# Patient Record
Sex: MALE | Race: WHITE | NOT HISPANIC OR LATINO | URBAN - METROPOLITAN AREA
[De-identification: names, ages, dates, MRNs, and addresses within clinical notes are randomized per-mention and may not be internally consistent; named-entity substitution may affect disease eponyms.]

---

## 2022-01-25 ENCOUNTER — INPATIENT (INPATIENT)
Facility: HOSPITAL | Age: 22
LOS: 27 days | Discharge: ANOTHER HOSPITAL | End: 2022-02-22
Attending: STUDENT IN AN ORGANIZED HEALTH CARE EDUCATION/TRAINING PROGRAM | Admitting: PSYCHIATRY & NEUROLOGY
Payer: COMMERCIAL

## 2022-01-25 VITALS — OXYGEN SATURATION: 100 %

## 2022-01-25 DIAGNOSIS — F41.9 ANXIETY DISORDER, UNSPECIFIED: ICD-10-CM

## 2022-01-25 DIAGNOSIS — F25.0 SCHIZOAFFECTIVE DISORDER, BIPOLAR TYPE: ICD-10-CM

## 2022-01-25 DIAGNOSIS — F84.0 AUTISTIC DISORDER: ICD-10-CM

## 2022-01-25 LAB
ALBUMIN SERPL ELPH-MCNC: 4.5 G/DL — SIGNIFICANT CHANGE UP (ref 3.3–5)
ALP SERPL-CCNC: 72 U/L — SIGNIFICANT CHANGE UP (ref 40–120)
ALT FLD-CCNC: 17 U/L — SIGNIFICANT CHANGE UP (ref 4–41)
ANION GAP SERPL CALC-SCNC: 13 MMOL/L — SIGNIFICANT CHANGE UP (ref 7–14)
APAP SERPL-MCNC: <10 UG/ML — LOW (ref 15–25)
AST SERPL-CCNC: 25 U/L — SIGNIFICANT CHANGE UP (ref 4–40)
BASOPHILS # BLD AUTO: 0 K/UL — SIGNIFICANT CHANGE UP (ref 0–0.2)
BASOPHILS NFR BLD AUTO: 0 % — SIGNIFICANT CHANGE UP (ref 0–2)
BILIRUB SERPL-MCNC: 1 MG/DL — SIGNIFICANT CHANGE UP (ref 0.2–1.2)
BUN SERPL-MCNC: 15 MG/DL — SIGNIFICANT CHANGE UP (ref 7–23)
CALCIUM SERPL-MCNC: 9.5 MG/DL — SIGNIFICANT CHANGE UP (ref 8.4–10.5)
CHLORIDE SERPL-SCNC: 100 MMOL/L — SIGNIFICANT CHANGE UP (ref 98–107)
CO2 SERPL-SCNC: 23 MMOL/L — SIGNIFICANT CHANGE UP (ref 22–31)
COVID-19 NUCLEOCAPSID GAM AB INTERP: POSITIVE
COVID-19 NUCLEOCAPSID TOTAL GAM ANTIBODY RESULT: 67.5 INDEX — HIGH
COVID-19 SPIKE DOMAIN AB INTERP: POSITIVE
COVID-19 SPIKE DOMAIN ANTIBODY RESULT: >250 U/ML — HIGH
CREAT SERPL-MCNC: 0.96 MG/DL — SIGNIFICANT CHANGE UP (ref 0.5–1.3)
EOSINOPHIL # BLD AUTO: 0 K/UL — SIGNIFICANT CHANGE UP (ref 0–0.5)
EOSINOPHIL NFR BLD AUTO: 0 % — SIGNIFICANT CHANGE UP (ref 0–6)
ETHANOL SERPL-MCNC: <10 MG/DL — SIGNIFICANT CHANGE UP
FLUAV AG NPH QL: SIGNIFICANT CHANGE UP
FLUBV AG NPH QL: SIGNIFICANT CHANGE UP
GLUCOSE SERPL-MCNC: 94 MG/DL — SIGNIFICANT CHANGE UP (ref 70–99)
HCT VFR BLD CALC: 44.6 % — SIGNIFICANT CHANGE UP (ref 39–50)
HGB BLD-MCNC: 15 G/DL — SIGNIFICANT CHANGE UP (ref 13–17)
IANC: 6.84 K/UL — SIGNIFICANT CHANGE UP (ref 1.5–8.5)
LYMPHOCYTES # BLD AUTO: 1.46 K/UL — SIGNIFICANT CHANGE UP (ref 1–3.3)
LYMPHOCYTES # BLD AUTO: 14.5 % — SIGNIFICANT CHANGE UP (ref 13–44)
MANUAL SMEAR VERIFICATION: SIGNIFICANT CHANGE UP
MCHC RBC-ENTMCNC: 29.6 PG — SIGNIFICANT CHANGE UP (ref 27–34)
MCHC RBC-ENTMCNC: 33.6 GM/DL — SIGNIFICANT CHANGE UP (ref 32–36)
MCV RBC AUTO: 88 FL — SIGNIFICANT CHANGE UP (ref 80–100)
MONOCYTES # BLD AUTO: 1.29 K/UL — HIGH (ref 0–0.9)
MONOCYTES NFR BLD AUTO: 12.8 % — SIGNIFICANT CHANGE UP (ref 2–14)
NEUTROPHILS # BLD AUTO: 7.08 K/UL — SIGNIFICANT CHANGE UP (ref 1.8–7.4)
NEUTROPHILS NFR BLD AUTO: 70.1 % — SIGNIFICANT CHANGE UP (ref 43–77)
PLAT MORPH BLD: NORMAL — SIGNIFICANT CHANGE UP
PLATELET # BLD AUTO: 112 K/UL — LOW (ref 150–400)
PLATELET COUNT - ESTIMATE: ABNORMAL
POTASSIUM SERPL-MCNC: 4.3 MMOL/L — SIGNIFICANT CHANGE UP (ref 3.5–5.3)
POTASSIUM SERPL-SCNC: 4.3 MMOL/L — SIGNIFICANT CHANGE UP (ref 3.5–5.3)
PROT SERPL-MCNC: 7.8 G/DL — SIGNIFICANT CHANGE UP (ref 6–8.3)
RBC # BLD: 5.07 M/UL — SIGNIFICANT CHANGE UP (ref 4.2–5.8)
RBC # FLD: 13.4 % — SIGNIFICANT CHANGE UP (ref 10.3–14.5)
RBC BLD AUTO: NORMAL — SIGNIFICANT CHANGE UP
RSV RNA NPH QL NAA+NON-PROBE: SIGNIFICANT CHANGE UP
SALICYLATES SERPL-MCNC: <0.3 MG/DL — LOW (ref 15–30)
SARS-COV-2 IGG+IGM SERPL QL IA: 67.5 INDEX — HIGH
SARS-COV-2 IGG+IGM SERPL QL IA: >250 U/ML — HIGH
SARS-COV-2 IGG+IGM SERPL QL IA: POSITIVE
SARS-COV-2 IGG+IGM SERPL QL IA: POSITIVE
SARS-COV-2 RNA SPEC QL NAA+PROBE: SIGNIFICANT CHANGE UP
SODIUM SERPL-SCNC: 136 MMOL/L — SIGNIFICANT CHANGE UP (ref 135–145)
VALPROATE SERPL-MCNC: 125.1 UG/ML — HIGH (ref 50–100)
VARIANT LYMPHS # BLD: 2.6 % — SIGNIFICANT CHANGE UP (ref 0–6)
WBC # BLD: 10.1 K/UL — SIGNIFICANT CHANGE UP (ref 3.8–10.5)
WBC # FLD AUTO: 10.1 K/UL — SIGNIFICANT CHANGE UP (ref 3.8–10.5)

## 2022-01-25 PROCEDURE — 93010 ELECTROCARDIOGRAM REPORT: CPT

## 2022-01-25 PROCEDURE — 99285 EMERGENCY DEPT VISIT HI MDM: CPT | Mod: 25

## 2022-01-25 PROCEDURE — 99285 EMERGENCY DEPT VISIT HI MDM: CPT

## 2022-01-25 RX ORDER — HALOPERIDOL DECANOATE 100 MG/ML
5 INJECTION INTRAMUSCULAR EVERY 6 HOURS
Refills: 0 | Status: DISCONTINUED | OUTPATIENT
Start: 2022-01-25 | End: 2022-02-14

## 2022-01-25 RX ORDER — HALOPERIDOL DECANOATE 100 MG/ML
5 INJECTION INTRAMUSCULAR ONCE
Refills: 0 | Status: COMPLETED | OUTPATIENT
Start: 2022-01-25 | End: 2022-01-25

## 2022-01-25 RX ORDER — HALOPERIDOL DECANOATE 100 MG/ML
5 INJECTION INTRAMUSCULAR ONCE
Refills: 0 | Status: DISCONTINUED | OUTPATIENT
Start: 2022-01-25 | End: 2022-02-14

## 2022-01-25 RX ORDER — DIVALPROEX SODIUM 500 MG/1
1500 TABLET, DELAYED RELEASE ORAL AT BEDTIME
Refills: 0 | Status: DISCONTINUED | OUTPATIENT
Start: 2022-01-25 | End: 2022-01-26

## 2022-01-25 RX ORDER — RISPERIDONE 4 MG/1
3 TABLET ORAL ONCE
Refills: 0 | Status: COMPLETED | OUTPATIENT
Start: 2022-01-25 | End: 2022-01-25

## 2022-01-25 RX ORDER — DIVALPROEX SODIUM 500 MG/1
1500 TABLET, DELAYED RELEASE ORAL ONCE
Refills: 0 | Status: DISCONTINUED | OUTPATIENT
Start: 2022-01-25 | End: 2022-01-25

## 2022-01-25 RX ORDER — RISPERIDONE 4 MG/1
3 TABLET ORAL
Refills: 0 | Status: DISCONTINUED | OUTPATIENT
Start: 2022-01-25 | End: 2022-01-28

## 2022-01-25 RX ADMIN — Medication 2 MILLIGRAM(S): at 03:43

## 2022-01-25 RX ADMIN — HALOPERIDOL DECANOATE 5 MILLIGRAM(S): 100 INJECTION INTRAMUSCULAR at 03:43

## 2022-01-25 NOTE — ED ADULT NURSE REASSESSMENT NOTE - NS ED NURSE REASSESS COMMENT FT1
Received pt from RN break coverage. Pt is sitting up in bed, appears drowsy s/p IM meds received. Lab results pending. MD Delcid at bedside for reassessment. VSS, even unlabored respirations observed. Will continue to monitor for safety.

## 2022-01-25 NOTE — ED BEHAVIORAL HEALTH ASSESSMENT NOTE - PSYCHIATRIC ISSUES AND PLAN (INCLUDE STANDING AND PRN MEDICATION)
c/w  Depakote 1500mg po qd, Risperdal 3mg po bid, clonidine hcl 0.1mg po tid and ativan 1mg po qhs PRNS: Haldol 5mg po/im q6hrs prn, ativan 2mg po/im q6hrs

## 2022-01-25 NOTE — ED BEHAVIORAL HEALTH NOTE - BEHAVIORAL HEALTH NOTE
Patient is in need of inpatient psychiatric admission, no beds available at [facility], no beds at [facility], no beds at [facility].  Writer called [facility] spoke to Nataliya who confirmed bed availability and requested a faxed packet to Dr. Danielson which writer sent.

## 2022-01-25 NOTE — ED ADULT NURSE REASSESSMENT NOTE - NS ED NURSE REASSESS COMMENT FT1
Break Covering RN: Pt refusing blood work at this time. Pt states "your not taking my blood." pt given numerous opportunities for blood work and why blood work is need. Pt continued to refuse labs. Pt required IM medications.

## 2022-01-25 NOTE — ED BEHAVIORAL HEALTH ASSESSMENT NOTE - HPI (INCLUDE ILLNESS QUALITY, SEVERITY, DURATION, TIMING, CONTEXT, MODIFYING FACTORS, ASSOCIATED SIGNS AND SYMPTOMS)
21yr old Anabaptism M, domiciled with a psych hx of schizophrenia, ASD as per Psyckes, one past psych hospitalization at [facility] on 5/17/2019-6/18/2019 as per Pssuzettekes and follows up with a PACT team.     Patient would refuse to allow staff to take his belongings and he would refuse to change his clothes or provide vitals. He was given IM of Haldol 5mg and Ativan 2mg. Patient is a poor historian but he states that he went to the airport today to spontaneously buy a ticket to go to [place] to surprise his brother. He states he was also planning on moving there but does not provide details. 21yr old Alevism M, domiciled with a psych hx of schizophrenia vs. Bipolar , ASD as per Psyckes, 30-40 past psych hospitalizations (most recently discharged from [place] in 08/2021) and follows up with a PACT team in [place], no substance use, no trauma hx, was MILTON Virgen picked up from [facility] attempting to board a plane to [place].     Patient would refuse to allow staff to take his belongings and he would refuse to change his clothes or provide vitals. He was given IM of Haldol 5mg and Ativan 2mg. Patient is a poor historian but he states that he went to the airport today to spontaneously buy a ticket to go to [place] to surprise his brother. He states he was also planning on moving there but does not provide details on why.    When asked about medication, he states he takes his pills, pulls out a weeks pill sleeve but does not know the name of his medication. He then continuously repeats "I have a Pact Team".       Most information received from mother: 21yr old Yazdanism M, domiciled with a psych hx of schizophrenia vs. Bipolar , ASD as per Psyckes, 30-40 past psych hospitalizations (most recently discharged from [place] in 08/2021) and follows up with a PACT team in [place], no substance use, no trauma hx, was MILTON Virgen picked up from [facility] attempting to board a plane to [place].     Patient would refuse to allow staff to take his belongings and he would refuse to change his clothes or provide vitals.   Patient is a poor historian but he states that he went to the airport today to spontaneously buy a ticket to go to [place] to surprise his brother. He states he was also planning on moving there but does not provide details on why. He also said that his mom was very nervous because he was nervous and he doesn't know why.     When asked about medication, he states he takes his pills, pulls out a weeks pill sleeve but does not know the name of his medication. He then continuously repeats "I have a Pact Team".       Most information received from mother: 21yr old Religion M, domiciled with father in [place], [place], part-time employed at [employer], psych hx of schizophrenia (as per psyckes) vs. Bipolar I Disorder (as per mother) , ASD, 30-40 past psych hospitalizations (most recently discharged from [place] in 08/2021) and follows up with a PACT team in [place], no substance use, no trauma hx, was MILTON Virgen picked up from [facility] attempting to board a plane to [place] without a ticket.     Patient initially interviewed in intake area where he refused staff to take his belongings or perform his vitals. Ultimately he complied, did not require meds and then was interviewed in  [facility].     Patient is a poor historian, extremely concrete in TP, with significant perseveration of speech.   He states that he went to the airport today to spontaneously buy a ticket to go to [place] to surprise his brother. He states he was also planning on moving there but does not provide details on why. He also said that his mom was very nervous because he was nervous and he doesn't know why. Patient explained that his 2 fiancees who he met at [facility] were going to meet him at [facility]. He identifies them as GoldenEye and Firefox and when asked if they had real names, he was reluctant to provide them but then stated their names. He says that he has met them twice before and he is in love with them. When asked if he's provided them with money, he smiled to himself and stated he provided them with something "very expensive that was like a ring". He states that earlier in the day he was at home and then he "instinctively heard their voice" that told him to pack his bags and meet them at the airport so he packed his bag, took a bus and a taxi to [facility].     When asked about medication, he states he takes his pills, pulls out a weeks pill sleeve but does not know the name of his medication. He then continuously repeats "I have a Pact Team". He reports he's been taking his medication and they've made him very confused and he is more clear without them however as per mom, patient has been non-adherent with medications for approximately two weeks. He is unable to name the medication.     Patient denies any hallucinations, denies thought insertion/withdrawal, denies referential thought processes & is not paranoid on interview. It Is unclear if these two fiances are real people or delusional. Patient does not report nor exhibit any signs of dana, including irritable or elevated mood, grandiosity, pressured speech, increase in productivity or agitation. He reports he's been sleeping Patient denies any depressive symptoms including depressed mood, anhedonia, changes in energy/concentration/appetite, sleep disturbances, preoccupation with death or feelings of guilt. Patient denies SI, intent or plan; denies any HI, violent thoughts.    Collateral from Mother in  Note.     PACT Team in [place] ([place] is a PACT contact, phone number is office: [phone] ext. [phone], emergency line: [phone]) - Patient since 10/2021. At baseline patient is on the spectrum, he's limited as well. The team has been watching him taking his morning medication 3 days a week and his father watches him take his medications the rest of the time. Father is perceived to be limited. Shivam was last seen on 01/24/2022 and was clean, appropriate and calm. Restricted affect, normal speech. Father is usually intrusive when team is present. 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 Typical of patient to exhibit perseverative speech "I'll be like, I'll be like, I'll be like". Firefox and GoldenEye are two delusional figments. In the past he has left the house to meet these girls impulsively and got readmitted to the hospital. Concern that patient is abusing alcohol. Given diagnosis of Schizoaffective disorder, bipolar type. In the past has been suicidal but not recently.   Given the fact that he traveled across state lines, PACT team feels he's an acute danger and most likely should be rehospitalized.   Meds: Depakote 1500mg po qd, risperdal 3mg po bid, clonidine hcl 0.1mg po tid and ativan 1mg po qhs

## 2022-01-25 NOTE — ED BEHAVIORAL HEALTH ASSESSMENT NOTE - CASE SUMMARY
21yr old Mandaeism M, domiciled with father in [place], [place], part-time employed at [employer], psych hx of schizophrenia (as per psyckes) vs. Bipolar I Disorder (as per mother) , ASD, 30-40 past psych hospitalizations (most recently discharged from [place] in 08/2021) and follows up with a PACT team in [place], no substance use, no trauma hx, was MILTON Virgen picked up from [facility] attempting to board a plane to [place] without a ticket.  Patient is presenting after he impulsively took the bus and a taxi across state lines to meet his fiances (delusions). He has most likely been partially adherent with meds as PACT team watches him take it, but he has a hx of wandering and getting agitated when he starts to report these delusions. Extensive discussion with PACT Team and Mother show that patient is an acute danger to self and will require hospitalization for safety.

## 2022-01-25 NOTE — ED BEHAVIORAL HEALTH ASSESSMENT NOTE - SUBSTANCE ISSUES AND PLAN (INCLUDE STANDING AND PRN MEDICATION)
PATIENT INSTRUCTIONS    Treatment:    Surgery : You will be scheduled for left carpal tunnel release, left ring and left long finger trigger release surgery with Dr. Nath by one of our schedulers. Please look over all instructions given by the surgery scheduler, complete necessary pre-operative guidelines (PCP, labs, x-rays), and make note of post-operative appointment.  The surgery schedulers can be reached at [phone].    Follow-Up:    Please follow-up after surgery as directed by .    Time left: 11/17/2020 2:24 PM     Please note: 24 hour notice for cancellation of appointment is required.    You may receive a survey in the mail, or via the e-mail address that you have provided.  We would appreciate if you could fill out the survey and provide us with any feedback on your experience regarding your visit today. Thank you for allowing us to provide you with your health care needs.     Do not hesitate to call if you are experiencing severe pain, worsening or change in your pain, have symptoms of infection (fever, warmth, redness, increased drainage), or have any other problem that concerns you ~ [phone] (or [phone] after hours).    Please remember when requesting refills on pain medication that the request should be made by Thursday at the latest. [facility] is open Monday-Friday, 8am-5pm, and closed on the weekends.  No narcotic refills will be filled after hours.    Additional Educational Resources:  For additional resources regarding your symptoms, diagnosis, or further health information, please visit the Health Resources section on [email or web address] or the Online Health Resources section in [employer].  
CIWA given recent daily alcohol use

## 2022-01-25 NOTE — ED PROVIDER NOTE - PROGRESS NOTE DETAILS
Dr. Vidal Delcid DO (ED ATTENDING):  patient required haldol and ativan IM for lab draw to allow full medical evaluation    Per collateral obtained from psychiatric team, patient has been drinking alcohol regularly at home, but now has recently stopped drinking and was becoming tremulous and tachycardic in BH area. However, patient on my exam after IM haldol at ativan shows pt to be awake, alert, conversational, HR 90bpm, no tremors, and pt says his last alcoholic drink was ~1wk ago.  No sign of alcohol withdrawal at this time.     Pt will need inpatient psych admission per psych team. Currently awaiting inpatient [facility] bed. Dr. Temple: Pt was signed out to me awaiting Psych dispo. Pt resting in NAD.
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
## 2022-01-25 NOTE — ED BEHAVIORAL HEALTH NOTE - BEHAVIORAL HEALTH NOTE
Writer called [facility] [phone] was informed they are in receipt of referral packet, however they have not handed the packet over to Dr. Danielson and will give it to him when they see him.

## 2022-01-25 NOTE — ED BEHAVIORAL HEALTH ASSESSMENT NOTE - OTHER PAST PSYCHIATRIC HISTORY (INCLUDE DETAILS REGARDING ONSET, COURSE OF ILLNESS, INPATIENT/OUTPATIENT TREATMENT)
30-40 psych hospitalizations including a state hospitalization for over 10 months. Last hospitalization was in [facility] in 08/2021. As per psyckes he's diagnosed with schizophrenia but as per mother he's diagnosed with 30-40 psych hospitalizations including a state hospitalization for over 10 months starting at the age of 13 years old. Last hospitalization was in [facility] in 08/2021. As per psyckes he's diagnosed with schizophrenia but as per mother he's diagnosed with Bipolar I Disorder.   Currently followed by a PACT team in [place] - ([place] is a PACT contact, phone number is [phone]) - Message Left. Psychiatrist - Dr. Soliman

## 2022-01-25 NOTE — ED PROVIDER NOTE - CLINICAL SUMMARY MEDICAL DECISION MAKING FREE TEXT BOX
20yo M with PMHX Bipolar (formerly on medications but not on meds for 1-2wks per EMS), BIBEMS after patient found wandering [facility] airport attempting to board flight to [place] despite having no ticket for flight. 911 called by mother who was suspicious he was at airport. Per EMS, mother says he has become increasingly agitated and bizarre at home. Pt denies SI or HI but admits to auditory hallucinations but cannot clarify what voices are saying to him.      EXAM as above. symptoms appear consistent with unmanaged psychiatric illness vs substance related. labs. will call psych for further management and dispo

## 2022-01-25 NOTE — ED ADULT TRIAGE NOTE - CHIEF COMPLAINT QUOTE
Patient brought in by Rajat from the airport. As per EMS, patient was trying to board a plane to [place] without having a ticket. Patient uncooperative in triage. Not answering questions appropriately during interview. Denies any SI/HI. Denies having vitals taken. PHx Bipolar Disorder. MD Delcid called for psychiatric evaluation. Patient brought in by Rajat from the airport. As per EMS, patient was trying to board a plane to [place] without having a ticket. Patient uncooperative in triage. Not answering questions appropriately, seems internally preoccupied during interview. Denies any SI/HI. Denies having vitals taken. PHx Bipolar Disorder. MD Delcid called for psychiatric evaluation.

## 2022-01-25 NOTE — ED BEHAVIORAL HEALTH ASSESSMENT NOTE - DESCRIPTION
refuses to change his clothes or allow staff to look at his belongings. tremulous throughout the conversation. Given IM Haldol 5mg and Ativan 2mg. Refused Vitals   Vital Signs Last 24 Hrs  T(C): --  T(F): --  HR: --  BP: --  BP(mean): --  RR: --  SpO2: 100% (25 Jan 2022 01:40) (100% - 100%) none Lives with Father in [place], works PT at [employer], parents are , wants to get his GED. some siblings live in [place] refuses to change his clothes or allow staff to look at his belongings but then complied. tremulous throughout the conversation.  Vital Signs Last 24 Hrs  T(C): 36.6 (25 Jan 2022 03:04), Max: 36.6 (25 Jan 2022 03:04)  T(F): 97.8 (25 Jan 2022 03:04), Max: 97.8 (25 Jan 2022 03:04)  HR: 121 (25 Jan 2022 03:04) (121 - 121)  BP: 138/99 (25 Jan 2022 03:04) (138/99 - 138/99)  BP(mean): --  RR: 18 (25 Jan 2022 03:04) (18 - 18)  SpO2: 100% (25 Jan 2022 03:04) (100% - 100%)

## 2022-01-25 NOTE — ED ADULT NURSE NOTE - OBJECTIVE STATEMENT
Patient brought in by Rajat from the airport. As per EMS, patient was trying to board a plane to [place] without having a ticket. Patient uncooperative in triage but not aggressive and requiring frequent prompting. Pt has delayed TP and appears internally preoccupied during interview however denies AVH, paranoia or IOR.. Denies any SI/HI. PHx Bipolar Disorder and reports he does not want to take his prescribed psychotropic medications; unable to recall names of meds. Pt reports +MJ use (reports last smoking a week ago), denies other illicit drug/etoh use. MD Delcid called for psychiatric evaluation.

## 2022-01-25 NOTE — ED BEHAVIORAL HEALTH ASSESSMENT NOTE - OTHER
[facility] no available beds oddly related concrete feels he's engaged to two women: [employer] Eye, Firefox

## 2022-01-25 NOTE — ED PROVIDER NOTE - PHYSICAL EXAMINATION
General: Patient alert in no apparent distress  Skin: Dry and intact  HEENT: Head atraumatic. Oral mucosa moist.   Eyes: Conjunctiva normal  Cardiac: Regular rhythm and rate. No pretibial edema b/l  Respiratory: Lungs clear b/l and symmetric. No respiratory distress. Able to speak in complete sentences.  Gastrointestinal: Abdomen soft, nondistended, nontender  Musculoskeletal: Moves all extremities spontaneously  Neurological: alert and oriented to person, place, and time  Psychiatric: Calm and cooperative but bizarre affect, slow response to questions and looks around room ,appears internally preoccupied

## 2022-01-25 NOTE — ED BEHAVIORAL HEALTH NOTE - BEHAVIORAL HEALTH NOTE
Worker called kate griffin and spoke to Jose Angel. Worker informed that at this time patient was assigned to [facility] and would no longer require bed.

## 2022-01-25 NOTE — ED BEHAVIORAL HEALTH ASSESSMENT NOTE - SUMMARY
21yr old Sabianism M, domiciled with father in [place], [place], part-time employed at [employer], psych hx of schizophrenia (as per psyckes) vs. Bipolar I Disorder (as per mother) , ASD, 30-40 past psych hospitalizations (most recently discharged from [place] in 08/2021) and follows up with a PACT team in [place], no substance use, no trauma hx, was MILTON Virgen picked up from [facility] attempting to board a plane to [place] without a ticket.  Patient is presenting after he impulsively took the bus and a taxi across state lines to meet his fiances (delusions). He has most likely been partially adherent with meds as PACT team watches him take it, but he has a hx of wandering and getting agitated when he starts to report these delusions. Extensive discussion with PACT Team and Mother show that patient is an acute danger to self and will require hospitalization for safety.

## 2022-01-25 NOTE — ED BEHAVIORAL HEALTH ASSESSMENT NOTE - NSSUICPROTFACT_PSY_ALL_CORE
Identifies reasons for living/Supportive social network of family or friends/Fear of death or the actual act of killing self/Scientology beliefs
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
## 2022-01-25 NOTE — ED PROVIDER NOTE - EMPLOYMENT
Tazorac Counseling:  Patient advised that medication is irritating and drying. Patient may need to apply sparingly and wash off after an hour before eventually leaving it on overnight. The patient verbalized understanding of the proper use and possible adverse effects of tazorac. All of the patient's questions and concerns were addressed. Benzoyl Peroxide Counseling: Patient counseled that medicine may cause skin irritation and bleach clothing. In the event of skin irritation, the patient was advised to reduce the amount of the drug applied or use it less frequently. The patient verbalized understanding of the proper use and possible adverse effects of benzoyl peroxide. All of the patient's questions and concerns were addressed. Doxycycline Pregnancy And Lactation Text: This medication is Pregnancy Category D and not consider safe during pregnancy. It is also excreted in breast milk but is considered safe for shorter treatment courses. Topical Sulfur Applications Counseling: Topical Sulfur Counseling: Patient counseled that this medication may cause skin irritation or allergic reactions. In the event of skin irritation, the patient was advised to reduce the amount of the drug applied or use it less frequently. The patient verbalized understanding of the proper use and possible adverse effects of topical sulfur application. All of the patient's questions and concerns were addressed. Isotretinoin Pregnancy And Lactation Text: This medication is Pregnancy Category X and is considered extremely dangerous during pregnancy. It is unknown if it is excreted in breast milk. Bactrim Counseling:  I discussed with the patient the risks of sulfa antibiotics including but not limited to GI upset, allergic reaction, drug rash, diarrhea, dizziness, photosensitivity, and yeast infections. Rarely, more serious reactions can occur including but not limited to aplastic anemia, agranulocytosis, methemoglobinemia, blood dyscrasias, liver or kidney failure, lung infiltrates or desquamative/blistering drug rashes. Detail Level: Zone Include Pregnancy/Lactation Warning?: No Spironolactone Counseling: Patient advised regarding risks of diarrhea, abdominal pain, hyperkalemia, birth defects (for female patients), liver toxicity and renal toxicity. The patient may need blood work to monitor liver and kidney function and potassium levels while on therapy. The patient verbalized understanding of the proper use and possible adverse effects of spironolactone. All of the patient's questions and concerns were addressed. Birth Control Pills Pregnancy And Lactation Text: This medication should be avoided if pregnant and for the first 30 days post-partum. Topical Clindamycin Pregnancy And Lactation Text: This medication is Pregnancy Category B and is considered safe during pregnancy. It is unknown if it is excreted in breast milk. Isotretinoin Counseling: Patient should get monthly blood tests, not donate blood, not drive at night if vision affected, not share medication, and not undergo elective surgery for 6 months after tx completed. Side effects reviewed, pt to contact office should one occur. Minocycline Counseling: Patient advised regarding possible photosensitivity and discoloration of the teeth, skin, lips, tongue and gums. Patient instructed to avoid sunlight, if possible. When exposed to sunlight, patients should wear protective clothing, sunglasses, and sunscreen. The patient was instructed to call the office immediately if the following severe adverse effects occur:  hearing changes, easy bruising/bleeding, severe headache, or vision changes. The patient verbalized understanding of the proper use and possible adverse effects of minocycline. All of the patient's questions and concerns were addressed. Bactrim Pregnancy And Lactation Text: This medication is Pregnancy Category D and is known to cause fetal risk. It is also excreted in breast milk. Dapsone Counseling: I discussed with the patient the risks of dapsone including but not limited to hemolytic anemia, agranulocytosis, rashes, methemoglobinemia, kidney failure, peripheral neuropathy, headaches, GI upset, and liver toxicity. Patients who start dapsone require monitoring including baseline LFTs and weekly CBCs for the first month, then every month thereafter. The patient verbalized understanding of the proper use and possible adverse effects of dapsone. All of the patient's questions and concerns were addressed. Benzoyl Peroxide Pregnancy And Lactation Text: This medication is Pregnancy Category C. It is unknown if benzoyl peroxide is excreted in breast milk. Erythromycin Counseling:  I discussed with the patient the risks of erythromycin including but not limited to GI upset, allergic reaction, drug rash, diarrhea, increase in liver enzymes, and yeast infections. Minocycline Pregnancy And Lactation Text: This medication is Pregnancy Category D and not consider safe during pregnancy. It is also excreted in breast milk. Spironolactone Pregnancy And Lactation Text: This medication can cause feminization of the male fetus and should be avoided during pregnancy. The active metabolite is also found in breast milk. Erythromycin Pregnancy And Lactation Text: This medication is Pregnancy Category B and is considered safe during pregnancy. It is also excreted in breast milk. Topical Retinoid counseling:  Patient advised to apply a pea-sized amount only at bedtime and wait 30 minutes after washing their face before applying. If too drying, patient may add a non-comedogenic moisturizer. The patient verbalized understanding of the proper use and possible adverse effects of retinoids. All of the patient's questions and concerns were addressed. Topical Clindamycin Counseling: Patient counseled that this medication may cause skin irritation or allergic reactions. In the event of skin irritation, the patient was advised to reduce the amount of the drug applied or use it less frequently. The patient verbalized understanding of the proper use and possible adverse effects of clindamycin. All of the patient's questions and concerns were addressed. High Dose Vitamin A Pregnancy And Lactation Text: High dose vitamin A therapy is contraindicated during pregnancy and breast feeding. Azithromycin Counseling:  I discussed with the patient the risks of azithromycin including but not limited to GI upset, allergic reaction, drug rash, diarrhea, and yeast infections. Tetracycline Counseling: Patient counseled regarding possible photosensitivity and increased risk for sunburn. Patient instructed to avoid sunlight, if possible. When exposed to sunlight, patients should wear protective clothing, sunglasses, and sunscreen. The patient was instructed to call the office immediately if the following severe adverse effects occur:  hearing changes, easy bruising/bleeding, severe headache, or vision changes. The patient verbalized understanding of the proper use and possible adverse effects of tetracycline. All of the patient's questions and concerns were addressed. Patient understands to avoid pregnancy while on therapy due to potential birth defects. Dapsone Pregnancy And Lactation Text: This medication is Pregnancy Category C and is not considered safe during pregnancy or breast feeding. Tazorac Pregnancy And Lactation Text: This medication is not safe during pregnancy. It is unknown if this medication is excreted in breast milk. High Dose Vitamin A Counseling: Side effects reviewed, pt to contact office should one occur. Sarecycline Counseling: Patient advised regarding possible photosensitivity and discoloration of the teeth, skin, lips, tongue and gums. Patient instructed to avoid sunlight, if possible. When exposed to sunlight, patients should wear protective clothing, sunglasses, and sunscreen. The patient was instructed to call the office immediately if the following severe adverse effects occur:  hearing changes, easy bruising/bleeding, severe headache, or vision changes. The patient verbalized understanding of the proper use and possible adverse effects of sarecycline. All of the patient's questions and concerns were addressed. Topical Sulfur Applications Pregnancy And Lactation Text: This medication is Pregnancy Category C and has an unknown safety profile during pregnancy. It is unknown if this topical medication is excreted in breast milk. Azithromycin Pregnancy And Lactation Text: This medication is considered safe during pregnancy and is also secreted in breast milk. Unemployed Doxycycline Counseling:  Patient counseled regarding possible photosensitivity and increased risk for sunburn. Patient instructed to avoid sunlight, if possible. When exposed to sunlight, patients should wear protective clothing, sunglasses, and sunscreen. The patient was instructed to call the office immediately if the following severe adverse effects occur:  hearing changes, easy bruising/bleeding, severe headache, or vision changes. The patient verbalized understanding of the proper use and possible adverse effects of doxycycline. All of the patient's questions and concerns were addressed. Topical Retinoid Pregnancy And Lactation Text: This medication is Pregnancy Category C. It is unknown if this medication is excreted in breast milk. Birth Control Pills Counseling: Birth Control Pill Counseling: I discussed with the patient the potential side effects of OCPs including but not limited to increased risk of stroke, heart attack, thrombophlebitis, deep venous thrombosis, hepatic adenomas, breast changes, GI upset, headaches, and depression. The patient verbalized understanding of the proper use and possible adverse effects of OCPs. All of the patient's questions and concerns were addressed. none

## 2022-01-25 NOTE — ED PROVIDER NOTE - OBJECTIVE STATEMENT
22yo M with PMHX Bipolar (formerly on medications but not on meds for 1-2wks per EMS), BIBEMS after patient found wandering [facility] airport attempting to board flight to [place] despite having no ticket for flight. 911 called by mother who was suspicious he was at airport. Per EMS, mother says he has become increasingly agitated and bizarre at home. Pt denies SI or HI but admits to auditory hallucinations but cannot clarify what voices are saying to him.  Denies any physical complaints.

## 2022-01-25 NOTE — ED BEHAVIORAL HEALTH ASSESSMENT NOTE - RISK ASSESSMENT
Elevated risk of harm to self and others at this time given impulsivity, active psychosis, partial adherence with treatment, minimal insight into need for treatment, hx of agitation and aggression.   protective factors include supportive family, Islam beliefs, no current SI and active treatment team. Given his recent wandering behaviors, pt will require hospitalization for stabilization. High Acute Suicide Risk

## 2022-01-25 NOTE — ED BEHAVIORAL HEALTH NOTE - BEHAVIORAL HEALTH NOTE
COVID Exposure Screen- Patient  1.	*Have you had a COVID-19 test in the last 90 days?  (  ) Yes   (  x) No   (  ) Unknown- Reason: _____  IF YES PROCEED TO QUESTION #2. IF NO OR UNKNOWN, PLEASE SKIP TO QUESTION #3.  2.	Date of test(s) and result(s): ________  3.	*Have you tested positive for COVID-19 antibodies? (  ) Yes   ( x ) No   (  ) Unknown- Reason: _____  IF YES PROCEED TO QUESTION #4. IF NO or UNKNOWN, PLEASE SKIP TO QUESTION #5.  4.	Date of positive antibody test: ________  5.	*Have you received 2 doses of the COVID-19 vaccine? (  ) Yes   (x  ) No   (  ) Unknown- Reason: _____   IF YES PROCEED TO QUESTION #6. IF NO or UNKNOWN, PLEASE SKIP TO QUESTION #7.  6.	Date of second dose: ________  7.	*In the past 10 days, have you been around anyone with a positive COVID-19 test?* (  ) Yes   ( x ) No   (  ) Unknown- Reason: ____  IF YES PROCEED TO QUESTION #8. IF NO or UNKNOWN, PLEASE SKIP TO QUESTION #13.  8.	Were you within 6 feet of them for at least 15 minutes? (  ) Yes   (  ) No   (  ) Unknown- Reason: _____  9.	Have you provided care for them? (  ) Yes   (  ) No   (  ) Unknown- Reason: ______  10.	Have you had direct physical contact with them (touched, hugged, or kissed them)? (  ) Yes   (  ) No    (  ) Unknown- Reason: _____  11.	Have you shared eating or drinking utensils with them? (  ) Yes   (  ) No    (  ) Unknown- Reason: ____  12.	Have they sneezed, coughed, or somehow gotten respiratory droplets on you? (  ) Yes   (  ) No    (  ) Unknown- Reason: ______  13.	*Have you been out of [place] within the past 10 days?* ( x ) Yes   (  ) No   (  ) Unknown- Reason: _____  IF YES PLEASE ANSWER THE FOLLOWING QUESTIONS:  14.	Which state/country have you been to? _[place]_____  15.	Were you there over 24 hours? (X  ) Yes   (  ) No    (  ) Unknown- Reason: ______  16.	Date of return to [facility]: _01/24/2022_____

## 2022-01-26 LAB
A1C WITH ESTIMATED AVERAGE GLUCOSE RESULT: 5.3 % — SIGNIFICANT CHANGE UP (ref 4–5.6)
CHOLEST SERPL-MCNC: 179 MG/DL — SIGNIFICANT CHANGE UP
ESTIMATED AVERAGE GLUCOSE: 105 — SIGNIFICANT CHANGE UP
HDLC SERPL-MCNC: 82 MG/DL — SIGNIFICANT CHANGE UP
LIPID PNL WITH DIRECT LDL SERPL: 85 MG/DL — SIGNIFICANT CHANGE UP
NON HDL CHOLESTEROL: 97 MG/DL — SIGNIFICANT CHANGE UP
TRIGL SERPL-MCNC: 62 MG/DL — SIGNIFICANT CHANGE UP
VALPROATE SERPL-MCNC: 35.7 UG/ML — LOW (ref 50–100)

## 2022-01-26 PROCEDURE — 99222 1ST HOSP IP/OBS MODERATE 55: CPT

## 2022-01-26 RX ORDER — NICOTINE POLACRILEX 2 MG
2 GUM BUCCAL EVERY 4 HOURS
Refills: 0 | Status: DISCONTINUED | OUTPATIENT
Start: 2022-01-26 | End: 2022-02-22

## 2022-01-26 RX ORDER — DIVALPROEX SODIUM 500 MG/1
1250 TABLET, DELAYED RELEASE ORAL AT BEDTIME
Refills: 0 | Status: DISCONTINUED | OUTPATIENT
Start: 2022-01-26 | End: 2022-01-31

## 2022-01-26 RX ADMIN — Medication 0.1 MILLIGRAM(S): at 09:57

## 2022-01-26 RX ADMIN — DIVALPROEX SODIUM 1250 MILLIGRAM(S): 500 TABLET, DELAYED RELEASE ORAL at 20:36

## 2022-01-26 RX ADMIN — RISPERIDONE 3 MILLIGRAM(S): 4 TABLET ORAL at 20:37

## 2022-01-26 RX ADMIN — RISPERIDONE 3 MILLIGRAM(S): 4 TABLET ORAL at 09:57

## 2022-01-26 RX ADMIN — Medication 2 MILLIGRAM(S): at 11:07

## 2022-01-26 RX ADMIN — Medication 0.1 MILLIGRAM(S): at 14:49

## 2022-01-26 RX ADMIN — Medication 1 MILLIGRAM(S): at 20:37

## 2022-01-26 RX ADMIN — Medication 0.1 MILLIGRAM(S): at 20:37

## 2022-01-26 NOTE — BH INPATIENT PSYCHIATRY ASSESSMENT NOTE - DESCRIPTION
Lives with Father in [place], works PT at [employer], parents are , wants to get his GED. some siblings live in [place]

## 2022-01-26 NOTE — BH INPATIENT PSYCHIATRY ASSESSMENT NOTE - RISK ASSESSMENT
Risk factors: age, sex, chronic frustration intolerance, psychiatric diagnosis, substance use hx  protective factors: no hx of suicide attempts, currently not endorsing harm to self or others, is in a controlled setting with limited access to lethal means

## 2022-01-26 NOTE — BH INPATIENT PSYCHIATRY ASSESSMENT NOTE - CURRENT MEDICATION
MEDICATIONS  (STANDING):  cloNIDine 0.1 milliGRAM(s) Oral three times a day  diVALproex ER 1500 milliGRAM(s) Oral at bedtime  LORazepam     Tablet 1 milliGRAM(s) Oral at bedtime  risperiDONE   Tablet 3 milliGRAM(s) Oral two times a day    MEDICATIONS  (PRN):  haloperidol     Tablet 5 milliGRAM(s) Oral every 6 hours PRN agitation  haloperidol    Injectable 5 milliGRAM(s) IntraMuscular once PRN severe agitation  LORazepam     Tablet 2 milliGRAM(s) Oral every 6 hours PRN Agitation  LORazepam     Tablet 2 milliGRAM(s) Oral every 2 hours PRN CIWA score increase by 2 points and current CIWA score GREATER THAN 9  LORazepam   Injectable 2 milliGRAM(s) IntraMuscular Once PRN severe agitation  nicotine  Polacrilex Gum 2 milliGRAM(s) Oral every 4 hours PRN nrt

## 2022-01-26 NOTE — BH INPATIENT PSYCHIATRY ASSESSMENT NOTE - NSSUICPROTFACT_PSY_ALL_CORE
Identifies reasons for living/Cultural, spiritual and/or moral attitudes against suicide/Positive therapeutic relationships

## 2022-01-26 NOTE — BH INPATIENT PSYCHIATRY ASSESSMENT NOTE - HPI (INCLUDE ILLNESS QUALITY, SEVERITY, DURATION, TIMING, CONTEXT, MODIFYING FACTORS, ASSOCIATED SIGNS AND SYMPTOMS)
21yr old Taoism M, domiciled with father in [place], [place], part-time employed at [employer], psych hx of schizophrenia (as per psyckes) vs. Bipolar I Disorder (as per mother) , ASD, 30-40 past psych hospitalizations (most recently discharged from [place] in 08/2021) and follows up with a PACT team in [place], no substance use, no trauma hx, was BIB [facility] picked up from [facility] attempting to board a plane to [place] without a ticket.     Patient is a limited historian and with limited reliability. Patient reports he wanted to go to [facility] to fly to [place] so he can meet his friends. States that he did not have a plane ticket, but that he was intending to purchase one at the airport before [facility] team approached him. He states he doesn't like [employer] and they often convince him to go to the hospital which he wants to avoid. States that he doesn't need medications and that he'd prefer to off medications so the primary team can observe how he does without meds. PSychoed provided to patient. He reports stable mood, denies low energy, concentration, denies AVH, denies SI and HI. He requests for nicotine gum for NRT. Denies racing thoughts. Expresses ambiv    Patient initially interviewed in intake area where he refused staff to take his belongings or perform his vitals. Ultimately he complied, did not require meds and then was interviewed in  Hallways.     Patient is a poor historian, extremely concrete in TP, with significant perseveration of speech.   He states that he went to the [employer] today to spontaneously buy a ticket to go to [place] to surprise his brother. He states he was also planning on moving there but does not provide details on why. He also said that his mom was very nervous because he was nervous and he doesn't know why. Patient explained that his 2 fiancees who he met at [facility] were going to meet him at [facility]. He identifies them as GoldenEye and Firefox and when asked if they had real names, he was reluctant to provide them but then stated their names. He says that he has met them twice before and he is in love with them. When asked if he's provided them with money, he smiled to himself and stated he provided them with something "very expensive that was like a ring". He states that earlier in the day he was at home and then he "instinctively heard their voice" that told him to pack his bags and meet them at the airport so he packed his bag, took a bus and a taxi to [facility].     When asked about medication, he states he takes his pills, pulls out a weeks pill sleeve but does not know the name of his medication. He then continuously repeats "I have a Pact Team". He reports he's been taking his medication and they've made him very confused and he is more clear without them however as per mom, patient has been non-adherent with medications for approximately two weeks. He is unable to name the medication.     Patient denies any hallucinations, denies thought insertion/withdrawal, denies referential thought processes & is not paranoid on interview. It Is unclear if these two fiances are real people or delusional. Patient does not report nor exhibit any signs of dana, including irritable or elevated mood, grandiosity, pressured speech, increase in productivity or agitation. He reports he's been sleeping Patient denies any depressive symptoms including depressed mood, anhedonia, changes in energy/concentration/appetite, sleep disturbances, preoccupation with death or feelings of guilt. Patient denies SI, intent or plan; denies any HI, violent thoughts.    Collateral from Mother in  Note.     PACT Team in [place] ([place] is a PACT contact, phone number is office: [phone] ext. [phone], emergency line: [phone]) - Patient since 10/2021. At baseline patient is on the spectrum, he's limited as well. The team has been watching him taking his morning medication 3 days a week and his father watches him take his medications the rest of the time. Father is perceived to be limited. Shivam was last seen on 01/24/2022 and was clean, appropriate and calm. Restricted affect, normal speech. Father is usually intrusive when team is present. 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 Typical of patient to exhibit perseverative speech "I'll be like, I'll be like, I'll be like". Firefox and GoldenEye are two delusional figments. In the past he has left the house to meet these girls impulsively and got readmitted to the hospital. Concern that patient is abusing alcohol. Given diagnosis of Schizoaffective disorder, bipolar type. In the past has been suicidal but not recently.   Given the fact that he traveled across state lines, PACT team feels he's an acute danger and most likely should be rehospitalized.   Meds: Depakote 1500mg po qd, risperdal 3mg po bid, clonidine hcl 0.1mg po tid and ativan 1mg po qhs 21yr old Pentecostal M, domiciled with father in [place], [place], part-time employed at [employer], psych hx of schizophrenia (as per psyckes) vs. Bipolar I Disorder (as per mother) , ASD, 30-40 past psych hospitalizations (most recently discharged from [place] in 08/2021) and follows up with a PACT team in [place], no substance use, no trauma hx, was BIB [facility] picked up from [facility] attempting to board a plane to [place] without a ticket.     Patient is a limited historian and with limited reliability. Patient reports he wanted to go to [facility] to fly to [place] so he can meet his friends. States that he did not have a plane ticket, but that he was intending to purchase one at the airport before [facility] team approached him. He states he doesn't like [employer] and they often convince him to go to the hospital which he wants to avoid. States that he doesn't need medications and that he'd prefer to off medications so the primary team can observe how he does without meds. PSychoed provided to patient. He reports stable mood, denies low energy, concentration, denies AVH, denies SI and HI. He requests for nicotine gum for NRT. Denies racing thoughts. Expresses ambivalence with regard to medications.     Per ED note:     Patient initially interviewed in intake area where he refused staff to take his belongings or perform his vitals. Ultimately he complied, did not require meds and then was interviewed in  Hallways.     Patient is a poor historian, extremely concrete in TP, with significant perseveration of speech.   He states that he went to the airport today to spontaneously buy a ticket to go to [place] to surprise his brother. He states he was also planning on moving there but does not provide details on why. He also said that his mom was very nervous because he was nervous and he doesn't know why. Patient explained that his 2 fiancees who he met at [facility] were going to meet him at [facility]. He identifies them as GoldenEye and Firefox and when asked if they had real names, he was reluctant to provide them but then stated their names. He says that he has met them twice before and he is in love with them. When asked if he's provided them with money, he smiled to himself and stated he provided them with something "very expensive that was like a ring". He states that earlier in the day he was at home and then he "instinctively heard their voice" that told him to pack his bags and meet them at the airport so he packed his bag, took a bus and a taxi to [facility].     When asked about medication, he states he takes his pills, pulls out a weeks pill sleeve but does not know the name of his medication. He then continuously repeats "I have a Pact Team". He reports he's been taking his medication and they've made him very confused and he is more clear without them however as per mom, patient has been non-adherent with medications for approximately two weeks. He is unable to name the medication.     Patient denies any hallucinations, denies thought insertion/withdrawal, denies referential thought processes & is not paranoid on interview. It Is unclear if these two fiances are real people or delusional. Patient does not report nor exhibit any signs of dana, including irritable or elevated mood, grandiosity, pressured speech, increase in productivity or agitation. He reports he's been sleeping Patient denies any depressive symptoms including depressed mood, anhedonia, changes in energy/concentration/appetite, sleep disturbances, preoccupation with death or feelings of guilt. Patient denies SI, intent or plan; denies any HI, violent thoughts.    Collateral from Mother in  Note.     PA[place] Team in [place] ([place] is a PACT contact, phone number is office: [phone] ext. [phone], emergency line: [phone]) - Patient since 10/2021. At baseline patient is on the spectrum, he's limited as well. The team has been watching him taking his morning medication 3 days a week and his father watches him take his medications the rest of the time. Father is perceived to be limited. Shivam was last seen on 01/24/2022 and was clean, appropriate and calm. Restricted affect, normal speech. Father is usually intrusive when team is present. 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 Typical of patient to exhibit perseverative speech "I'll be like, I'll be like, I'll be like". Firefox and Golden[employer] are two delusional figments. In the past he has left the house to meet these girls impulsively and got readmitted to the hospital. Concern that patient is abusing alcohol. Given diagnosis of Schizoaffective disorder, bipolar type. In the past has been suicidal but not recently.   Given the fact that he traveled across state lines, PACT team feels he's an acute danger and most likely should be rehospitalized.   Meds: Depakote 1500mg po qd, risperdal 3mg po bid, clonidine hcl 0.1mg po tid and ativan 1mg po qhs

## 2022-01-26 NOTE — BH INPATIENT PSYCHIATRY ASSESSMENT NOTE - NSBHCHARTREVIEWVS_PSY_A_CORE FT
Vital Signs Last 24 Hrs  T(C): 36.8 (01-26-22 @ 09:40), Max: 37 (01-26-22 @ 08:14)  T(F): 98.2 (01-26-22 @ 09:40), Max: 98.6 (01-26-22 @ 08:14)  HR: 75 (01-26-22 @ 00:23) (75 - 97)  BP: 120/72 (01-26-22 @ 00:23) (112/87 - 120/72)  BP(mean): --  RR: 18 (01-25-22 @ 17:02) (16 - 18)  SpO2: 100% (01-25-22 @ 17:02) (100% - 100%)    Orthostatic VS  01-26-22 @ 09:40  Lying BP: --/-- HR: --  Sitting BP: 129/84 HR: 97  Standing BP: 131/89 HR: 116  Site: --  Mode: --  Orthostatic VS  01-26-22 @ 08:14  Lying BP: --/-- HR: --  Sitting BP: 116/79 HR: 106  Standing BP: --/-- HR: --  Site: --  Mode: --  Orthostatic VS  01-25-22 @ 21:32  Lying BP: --/-- HR: --  Sitting BP: 128/92 HR: 93  Standing BP: 118/90 HR: 118  Site: --  Mode: --  Orthostatic VS  01-25-22 @ 18:36  Lying BP: --/-- HR: --  Sitting BP: 110/90 HR: 82  Standing BP: 116/79 HR: 100  Site: --  Mode: --

## 2022-01-26 NOTE — PSYCHIATRIC REHAB INITIAL EVALUATION - NSBHPRRECOMMEND_PSY_ALL_CORE
Due to pt being a possible aggression risk, pt's information will be obtained from pt's treatment team and pt's chart records. As per chart, pt was [facility] picked up from [facility] after attempting to board a plane to [place] without a ticket. As per chart, in ED pt was observed as a poor historian and was extremely concrete in TP. As per chart and his mother, pt has been non-adherent with medications for approximately two weeks. As per chart, pt has history of schizophrenia vs. bipolar I disorder, ASD, 30-40 past psych hospitalizations (most recently discharged from [place] in 8/2021), PACT team in [place].

## 2022-01-26 NOTE — BH PATIENT PROFILE - FALL HARM RISK - UNIVERSAL INTERVENTIONS
Bed in lowest position, wheels locked, appropriate side rails in place/Call bell, personal items and telephone in reach/Instruct patient to call for assistance before getting out of bed or chair/Non-slip footwear when patient is out of bed/[place] to call system/Physically safe environment - no spills, clutter or unnecessary equipment/Purposeful Proactive Rounding/Room/bathroom lighting operational, light cord in reach

## 2022-01-26 NOTE — BH INPATIENT PSYCHIATRY ASSESSMENT NOTE - NSCURPASTPSYDX_PSY_ALL_CORE
Bedside and Verbal shift change report given to Shyann Ornelas RN (oncoming nurse) by Jimena Hoffman RN (offgoing nurse). Report included the following information SBAR, Procedure Summary, Intake/Output, MAR, Cardiac Rhythm MSR and Alarm Parameters . Mood disorder/Psychotic disorder/Alcohol/Substance Use disorders

## 2022-01-26 NOTE — BH INPATIENT PSYCHIATRY ASSESSMENT NOTE - OTHER PAST PSYCHIATRIC HISTORY (INCLUDE DETAILS REGARDING ONSET, COURSE OF ILLNESS, INPATIENT/OUTPATIENT TREATMENT)
30-40 psych hospitalizations including a state hospitalization for over 10 months starting at the age of 13 years old. Last hospitalization was in [facility] in 08/2021. As per psyckes he's diagnosed with schizophrenia but as per mother he's diagnosed with Bipolar I Disorder.   Currently followed by a PACT team in [place] - ([place] is a PACT contact, phone number is [phone]) - Message Left. Psychiatrist - Dr. Soliman

## 2022-01-27 LAB — SARS-COV-2 RNA SPEC QL NAA+PROBE: SIGNIFICANT CHANGE UP

## 2022-01-27 RX ADMIN — Medication 0.1 MILLIGRAM(S): at 08:56

## 2022-01-27 RX ADMIN — Medication 2 MILLIGRAM(S): at 09:45

## 2022-01-27 RX ADMIN — HALOPERIDOL DECANOATE 5 MILLIGRAM(S): 100 INJECTION INTRAMUSCULAR at 09:46

## 2022-01-27 RX ADMIN — Medication 0.1 MILLIGRAM(S): at 13:09

## 2022-01-27 RX ADMIN — Medication 2 MILLIGRAM(S): at 10:27

## 2022-01-27 RX ADMIN — DIVALPROEX SODIUM 1250 MILLIGRAM(S): 500 TABLET, DELAYED RELEASE ORAL at 20:17

## 2022-01-27 RX ADMIN — RISPERIDONE 3 MILLIGRAM(S): 4 TABLET ORAL at 08:55

## 2022-01-27 RX ADMIN — Medication 1 MILLIGRAM(S): at 20:17

## 2022-01-27 RX ADMIN — Medication 0.1 MILLIGRAM(S): at 20:17

## 2022-01-27 RX ADMIN — RISPERIDONE 3 MILLIGRAM(S): 4 TABLET ORAL at 20:17

## 2022-01-27 NOTE — BH INPATIENT PSYCHIATRY PROGRESS NOTE - NSBHMETABOLIC_PSY_ALL_CORE_FT
BMI:   HbA1c: A1C with Estimated Average Glucose Result: 5.3 % (01-26-22 @ 10:04)    Glucose:   BP: 118/82 (01-27-22 @ 06:48) (106/74 - 138/99)  Lipid Panel: Date/Time: 01-26-22 @ 10:04  Cholesterol, Serum: 179  Direct LDL: --  HDL Cholesterol, Serum: 82  Total Cholesterol/HDL Ration Measurement: --  Triglycerides, Serum: 62

## 2022-01-27 NOTE — BH INPATIENT PSYCHIATRY PROGRESS NOTE - MSE UNSTRUCTURED FT
age appearing thin male dressed in casual clothing. Hygiene + grooming is poor. no sialorrhea today. fair eye contact. Behavioral calm, pleasant. Speech is stuttered, slow, increased latency, perseveration. Mood "fine." affect is flat. Thought process with blocking, illogical, concrete. Thought content indicative of impaired reality testing. Denies SI and HI. Does not respond to AVH; doesn't appear internally preoccupied. Insight + judgement poor. Impulse control historically poor. psychomotor slow. gait intact. less prominent repetitive hand movements

## 2022-01-27 NOTE — BH SOCIAL WORK INITIAL PSYCHOSOCIAL EVALUATION - OTHER PAST PSYCHIATRIC HISTORY (INCLUDE DETAILS REGARDING ONSET, COURSE OF ILLNESS, INPATIENT/OUTPATIENT TREATMENT)
Per EMR pt has a hx of schizophrenia vs bipolar, asperger's syndrome, has had multiple inpt admissions , last @ a hospital in [place] in 8/21, is currently in tx with PACT team in [place], medication compliancy is not historically consistent. Per EMR pt has no hx of  SA/SI, has had delusional behavior/ AH, endorses hearing voices when decompensated,    Per EMR pt has a hx of schizophrenia vs bipolar, asperger's syndrome, has had multiple inpt admissions , last @ a hospital in [place] in 8/21, is currently in tx with PACT team in [place], medication compliancy is not historically consistent. Per EMR pt has no hx of  SA/SI, has had delusional behavior/ AH, endorses hearing voices when decompensated, has no hx of substance abuse, no aggression, no violent uncontrollable behaviors, has a hx impulsivity and unpredictable behavior, has no legal issues , no medical hx

## 2022-01-27 NOTE — BH SOCIAL WORK INITIAL PSYCHOSOCIAL EVALUATION - NSCMSPTSTRENGTHS_PSY_ALL_CORE
Compliance to treatment/Michaela/spirituality/Intact employment/Physically healthy/Sense of Humor/Strong support system/Supportive family

## 2022-01-28 RX ORDER — RISPERIDONE 4 MG/1
4 TABLET ORAL AT BEDTIME
Refills: 0 | Status: DISCONTINUED | OUTPATIENT
Start: 2022-01-28 | End: 2022-01-31

## 2022-01-28 RX ORDER — RISPERIDONE 4 MG/1
3 TABLET ORAL DAILY
Refills: 0 | Status: DISCONTINUED | OUTPATIENT
Start: 2022-01-29 | End: 2022-01-31

## 2022-01-28 RX ADMIN — Medication 0.1 MILLIGRAM(S): at 21:18

## 2022-01-28 RX ADMIN — RISPERIDONE 3 MILLIGRAM(S): 4 TABLET ORAL at 10:32

## 2022-01-28 RX ADMIN — RISPERIDONE 4 MILLIGRAM(S): 4 TABLET ORAL at 21:18

## 2022-01-28 RX ADMIN — DIVALPROEX SODIUM 1250 MILLIGRAM(S): 500 TABLET, DELAYED RELEASE ORAL at 21:18

## 2022-01-28 RX ADMIN — Medication 0.1 MILLIGRAM(S): at 10:32

## 2022-01-28 RX ADMIN — Medication 1 MILLIGRAM(S): at 21:18

## 2022-01-28 NOTE — BH INPATIENT PSYCHIATRY PROGRESS NOTE - MSE UNSTRUCTURED FT
age appearing thin male dressed in casual clothing. Hygiene + grooming is poor. fair eye contact. Behavioral calm, pleasant. Speech is stuttered, slow, increased latency, perseveration. Mood "fine." affect is flat. Thought process with perseveration. Thought content indicative of impaired reality testing. Denies SI and HI. denies avh but observed responding to internal stimuli. Insight + judgement poor. Impulse control historically poor. psychomotor slow. gait intact. less prominent repetitive hand movements

## 2022-01-28 NOTE — BH INPATIENT PSYCHIATRY PROGRESS NOTE - NSBHMETABOLIC_PSY_ALL_CORE_FT
BMI:   HbA1c: A1C with Estimated Average Glucose Result: 5.3 % (01-26-22 @ 10:04)    Glucose:   BP: 118/82 (01-27-22 @ 06:48) (106/74 - 120/72)  Lipid Panel: Date/Time: 01-26-22 @ 10:04  Cholesterol, Serum: 179  Direct LDL: --  HDL Cholesterol, Serum: 82  Total Cholesterol/HDL Ration Measurement: --  Triglycerides, Serum: 62

## 2022-01-28 NOTE — BH INPATIENT PSYCHIATRY PROGRESS NOTE - NSBHCHARTREVIEWVS_PSY_A_CORE FT
Vital Signs Last 24 Hrs  T(C): 36.7 (01-28-22 @ 08:29), Max: 36.8 (01-27-22 @ 19:11)  T(F): 98 (01-28-22 @ 08:29), Max: 98.2 (01-27-22 @ 19:11)  HR: --  BP: --  BP(mean): --  RR: --  SpO2: 100% (01-27-22 @ 20:34) (100% - 100%)    Orthostatic VS  01-28-22 @ 08:29  Lying BP: --/-- HR: --  Sitting BP: 106/77 HR: 87  Standing BP: --/-- HR: --  Site: --  Mode: --  Orthostatic VS  01-27-22 @ 20:34  Lying BP: --/-- HR: --  Sitting BP: 97/74 HR: 87  Standing BP: 102/70 HR: 108  Site: --  Mode: --  Orthostatic VS  01-27-22 @ 06:48  Lying BP: 106/70 HR: 102  Sitting BP: --/-- HR: --  Standing BP: 118/82 HR: 90  Site: --  Mode: --  Orthostatic VS  01-26-22 @ 21:39  Lying BP: --/-- HR: --  Sitting BP: 116/70 HR: 89  Standing BP: 111/67 HR: 90  Site: --  Mode: --  Orthostatic VS  01-26-22 @ 20:24  Lying BP: --/-- HR: --  Sitting BP: 120/76 HR: 76  Standing BP: 110/74 HR: 81  Site: --  Mode: --

## 2022-01-29 PROCEDURE — 99231 SBSQ HOSP IP/OBS SF/LOW 25: CPT

## 2022-01-29 RX ADMIN — RISPERIDONE 3 MILLIGRAM(S): 4 TABLET ORAL at 08:24

## 2022-01-29 RX ADMIN — RISPERIDONE 4 MILLIGRAM(S): 4 TABLET ORAL at 20:34

## 2022-01-29 RX ADMIN — HALOPERIDOL DECANOATE 5 MILLIGRAM(S): 100 INJECTION INTRAMUSCULAR at 22:20

## 2022-01-29 RX ADMIN — Medication 0.1 MILLIGRAM(S): at 08:23

## 2022-01-29 RX ADMIN — DIVALPROEX SODIUM 1250 MILLIGRAM(S): 500 TABLET, DELAYED RELEASE ORAL at 20:33

## 2022-01-29 RX ADMIN — Medication 2 MILLIGRAM(S): at 22:20

## 2022-01-29 NOTE — BH INPATIENT PSYCHIATRY PROGRESS NOTE - NSBHMETABOLIC_PSY_ALL_CORE_FT
BMI:   HbA1c: A1C with Estimated Average Glucose Result: 5.3 % (01-26-22 @ 10:04)    Glucose:   BP: 118/82 (01-27-22 @ 06:48) (110/68 - 118/82)  Lipid Panel: Date/Time: 01-26-22 @ 10:04  Cholesterol, Serum: 179  Direct LDL: --  HDL Cholesterol, Serum: 82  Total Cholesterol/HDL Ration Measurement: --  Triglycerides, Serum: 62

## 2022-01-29 NOTE — BH INPATIENT PSYCHIATRY PROGRESS NOTE - MSE UNSTRUCTURED FT
Dressed appropriately.  Good hygiene and grooming.  Calm and cooperative.  No psychomotor slowing or activation noted.  No abnormal movements noted.  Oddly related, good eye contact.  Speech regular rate, normal prosody.  Mood is "ok," affect strange smile.  Repetitive TP, limited associations.  TC: Poverty of TC.  Insight judgment and attention limited, language fluent, gait intact.

## 2022-01-29 NOTE — BH INPATIENT PSYCHIATRY PROGRESS NOTE - NSBHCHARTREVIEWVS_PSY_A_CORE FT
Vital Signs Last 24 Hrs  T(C): 36.4 (01-29-22 @ 07:54), Max: 36.4 (01-28-22 @ 18:36)  T(F): 97.6 (01-29-22 @ 07:54), Max: 97.6 (01-29-22 @ 07:54)  HR: --  BP: --  BP(mean): --  RR: --  SpO2: --    Orthostatic VS  01-29-22 @ 07:54  Lying BP: --/-- HR: --  Sitting BP: 108/79 HR: 92  Standing BP: --/-- HR: --  Site: --  Mode: --  Orthostatic VS  01-28-22 @ 20:20  Lying BP: --/-- HR: --  Sitting BP: 129/78 HR: 103  Standing BP: --/-- HR: --  Site: --  Mode: --  Orthostatic VS  01-28-22 @ 08:29  Lying BP: --/-- HR: --  Sitting BP: 106/77 HR: 87  Standing BP: --/-- HR: --  Site: --  Mode: --  Orthostatic VS  01-27-22 @ 20:34  Lying BP: --/-- HR: --  Sitting BP: 97/74 HR: 87  Standing BP: 102/70 HR: 108  Site: --  Mode: --

## 2022-01-30 PROCEDURE — 99231 SBSQ HOSP IP/OBS SF/LOW 25: CPT

## 2022-01-30 RX ADMIN — RISPERIDONE 3 MILLIGRAM(S): 4 TABLET ORAL at 09:05

## 2022-01-30 RX ADMIN — Medication 0.1 MILLIGRAM(S): at 09:05

## 2022-01-30 RX ADMIN — Medication 2 MILLIGRAM(S): at 16:28

## 2022-01-30 RX ADMIN — Medication 1 MILLIGRAM(S): at 20:28

## 2022-01-30 RX ADMIN — Medication 0.1 MILLIGRAM(S): at 13:16

## 2022-01-30 NOTE — BH INPATIENT PSYCHIATRY PROGRESS NOTE - NSBHMETABOLIC_PSY_ALL_CORE_FT
BMI:   HbA1c: A1C with Estimated Average Glucose Result: 5.3 % (01-26-22 @ 10:04)    Glucose:   BP: --  Lipid Panel: Date/Time: 01-26-22 @ 10:04  Cholesterol, Serum: 179  Direct LDL: --  HDL Cholesterol, Serum: 82  Total Cholesterol/HDL Ration Measurement: --  Triglycerides, Serum: 62

## 2022-01-30 NOTE — BH INPATIENT PSYCHIATRY PROGRESS NOTE - NSBHCHARTREVIEWVS_PSY_A_CORE FT
Vital Signs Last 24 Hrs  T(C): 36.3 (01-30-22 @ 08:28), Max: 36.3 (01-29-22 @ 18:31)  T(F): 97.3 (01-30-22 @ 08:28), Max: 97.3 (01-29-22 @ 18:31)  HR: --  BP: --  BP(mean): --  RR: --  SpO2: --    Orthostatic VS  01-30-22 @ 08:28  Lying BP: --/-- HR: --  Sitting BP: 110/78 HR: 91  Standing BP: --/-- HR: --  Site: --  Mode: --  Orthostatic VS  01-29-22 @ 07:54  Lying BP: --/-- HR: --  Sitting BP: 108/79 HR: 92  Standing BP: --/-- HR: --  Site: --  Mode: --  Orthostatic VS  01-28-22 @ 20:20  Lying BP: --/-- HR: --  Sitting BP: 129/78 HR: 103  Standing BP: --/-- HR: --  Site: --  Mode: --

## 2022-01-30 NOTE — BH INPATIENT PSYCHIATRY PROGRESS NOTE - MSE UNSTRUCTURED FT
Dressed appropriately.  Good hygiene and grooming.  Calm and superficially cooperative.  No psychomotor slowing or activation noted.  No abnormal movements noted.  Oddly related, good eye contact.  Speech regular rate, normal prosody, perseverative.  Mood is "ok," odd affect with inappropriate smiling.  Repetitive TP, limited associations.  TC: Poverty of TC.  Insight judgment and attention limited, language fluent, gait intact.

## 2022-01-31 LAB
ALBUMIN SERPL ELPH-MCNC: 4.5 G/DL — SIGNIFICANT CHANGE UP (ref 3.3–5)
ALP SERPL-CCNC: 70 U/L — SIGNIFICANT CHANGE UP (ref 40–120)
ALT FLD-CCNC: 25 U/L — SIGNIFICANT CHANGE UP (ref 4–41)
ANION GAP SERPL CALC-SCNC: 11 MMOL/L — SIGNIFICANT CHANGE UP (ref 7–14)
AST SERPL-CCNC: 28 U/L — SIGNIFICANT CHANGE UP (ref 4–40)
BASOPHILS # BLD AUTO: 0.01 K/UL — SIGNIFICANT CHANGE UP (ref 0–0.2)
BASOPHILS NFR BLD AUTO: 0.1 % — SIGNIFICANT CHANGE UP (ref 0–2)
BILIRUB SERPL-MCNC: 1.6 MG/DL — HIGH (ref 0.2–1.2)
BUN SERPL-MCNC: 10 MG/DL — SIGNIFICANT CHANGE UP (ref 7–23)
CALCIUM SERPL-MCNC: 9.5 MG/DL — SIGNIFICANT CHANGE UP (ref 8.4–10.5)
CHLORIDE SERPL-SCNC: 100 MMOL/L — SIGNIFICANT CHANGE UP (ref 98–107)
CO2 SERPL-SCNC: 23 MMOL/L — SIGNIFICANT CHANGE UP (ref 22–31)
CREAT SERPL-MCNC: 0.95 MG/DL — SIGNIFICANT CHANGE UP (ref 0.5–1.3)
EOSINOPHIL # BLD AUTO: 0 K/UL — SIGNIFICANT CHANGE UP (ref 0–0.5)
EOSINOPHIL NFR BLD AUTO: 0 % — SIGNIFICANT CHANGE UP (ref 0–6)
GLUCOSE SERPL-MCNC: 89 MG/DL — SIGNIFICANT CHANGE UP (ref 70–99)
HCT VFR BLD CALC: 40.8 % — SIGNIFICANT CHANGE UP (ref 39–50)
HGB BLD-MCNC: 14.5 G/DL — SIGNIFICANT CHANGE UP (ref 13–17)
IANC: 4.27 K/UL — SIGNIFICANT CHANGE UP (ref 1.5–8.5)
IMM GRANULOCYTES NFR BLD AUTO: 0.3 % — SIGNIFICANT CHANGE UP (ref 0–1.5)
LYMPHOCYTES # BLD AUTO: 1.76 K/UL — SIGNIFICANT CHANGE UP (ref 1–3.3)
LYMPHOCYTES # BLD AUTO: 24.5 % — SIGNIFICANT CHANGE UP (ref 13–44)
MAGNESIUM SERPL-MCNC: 1.9 MG/DL — SIGNIFICANT CHANGE UP (ref 1.6–2.6)
MCHC RBC-ENTMCNC: 30.1 PG — SIGNIFICANT CHANGE UP (ref 27–34)
MCHC RBC-ENTMCNC: 35.5 GM/DL — SIGNIFICANT CHANGE UP (ref 32–36)
MCV RBC AUTO: 84.8 FL — SIGNIFICANT CHANGE UP (ref 80–100)
MONOCYTES # BLD AUTO: 1.13 K/UL — HIGH (ref 0–0.9)
MONOCYTES NFR BLD AUTO: 15.7 % — HIGH (ref 2–14)
NEUTROPHILS # BLD AUTO: 4.27 K/UL — SIGNIFICANT CHANGE UP (ref 1.8–7.4)
NEUTROPHILS NFR BLD AUTO: 59.4 % — SIGNIFICANT CHANGE UP (ref 43–77)
NRBC # BLD: 0 /100 WBCS — SIGNIFICANT CHANGE UP
NRBC # FLD: 0 K/UL — SIGNIFICANT CHANGE UP
PHOSPHATE SERPL-MCNC: 3.3 MG/DL — SIGNIFICANT CHANGE UP (ref 2.5–4.5)
PLATELET # BLD AUTO: 171 K/UL — SIGNIFICANT CHANGE UP (ref 150–400)
POTASSIUM SERPL-MCNC: 3.8 MMOL/L — SIGNIFICANT CHANGE UP (ref 3.5–5.3)
POTASSIUM SERPL-SCNC: 3.8 MMOL/L — SIGNIFICANT CHANGE UP (ref 3.5–5.3)
PROT SERPL-MCNC: 7.5 G/DL — SIGNIFICANT CHANGE UP (ref 6–8.3)
RBC # BLD: 4.81 M/UL — SIGNIFICANT CHANGE UP (ref 4.2–5.8)
RBC # FLD: 13.8 % — SIGNIFICANT CHANGE UP (ref 10.3–14.5)
SARS-COV-2 RNA SPEC QL NAA+PROBE: SIGNIFICANT CHANGE UP
SODIUM SERPL-SCNC: 134 MMOL/L — LOW (ref 135–145)
VALPROATE SERPL-MCNC: 30.3 UG/ML — LOW (ref 50–100)
WBC # BLD: 7.19 K/UL — SIGNIFICANT CHANGE UP (ref 3.8–10.5)
WBC # FLD AUTO: 7.19 K/UL — SIGNIFICANT CHANGE UP (ref 3.8–10.5)

## 2022-01-31 PROCEDURE — 99231 SBSQ HOSP IP/OBS SF/LOW 25: CPT

## 2022-01-31 RX ORDER — RISPERIDONE 4 MG/1
3 TABLET ORAL
Refills: 0 | Status: DISCONTINUED | OUTPATIENT
Start: 2022-01-31 | End: 2022-02-02

## 2022-01-31 RX ORDER — HALOPERIDOL DECANOATE 100 MG/ML
5 INJECTION INTRAMUSCULAR ONCE
Refills: 0 | Status: COMPLETED | OUTPATIENT
Start: 2022-01-31 | End: 2022-01-31

## 2022-01-31 RX ORDER — PROCHLORPERAZINE MALEATE 5 MG
10 TABLET ORAL ONCE
Refills: 0 | Status: DISCONTINUED | OUTPATIENT
Start: 2022-01-31 | End: 2022-02-22

## 2022-01-31 RX ORDER — DIPHENHYDRAMINE HCL 50 MG
50 CAPSULE ORAL ONCE
Refills: 0 | Status: DISCONTINUED | OUTPATIENT
Start: 2022-01-31 | End: 2022-02-22

## 2022-01-31 RX ORDER — PROCHLORPERAZINE MALEATE 5 MG
10 TABLET ORAL EVERY 8 HOURS
Refills: 0 | Status: DISCONTINUED | OUTPATIENT
Start: 2022-01-31 | End: 2022-02-22

## 2022-01-31 RX ORDER — VALPROIC ACID (AS SODIUM SALT) 250 MG/5ML
1250 SOLUTION, ORAL ORAL AT BEDTIME
Refills: 0 | Status: DISCONTINUED | OUTPATIENT
Start: 2022-01-31 | End: 2022-02-02

## 2022-01-31 RX ORDER — DIPHENHYDRAMINE HCL 50 MG
50 CAPSULE ORAL ONCE
Refills: 0 | Status: COMPLETED | OUTPATIENT
Start: 2022-01-31 | End: 2022-01-31

## 2022-01-31 RX ORDER — ONDANSETRON 8 MG/1
4 TABLET, FILM COATED ORAL EVERY 6 HOURS
Refills: 0 | Status: DISCONTINUED | OUTPATIENT
Start: 2022-01-31 | End: 2022-01-31

## 2022-01-31 RX ORDER — DIPHENHYDRAMINE HCL 50 MG
50 CAPSULE ORAL EVERY 6 HOURS
Refills: 0 | Status: DISCONTINUED | OUTPATIENT
Start: 2022-01-31 | End: 2022-02-22

## 2022-01-31 RX ADMIN — Medication 2 MILLIGRAM(S): at 10:30

## 2022-01-31 RX ADMIN — Medication 50 MILLIGRAM(S): at 14:18

## 2022-01-31 RX ADMIN — Medication 0.1 MILLIGRAM(S): at 23:47

## 2022-01-31 RX ADMIN — Medication 1 MILLIGRAM(S): at 23:49

## 2022-01-31 RX ADMIN — HALOPERIDOL DECANOATE 5 MILLIGRAM(S): 100 INJECTION INTRAMUSCULAR at 14:18

## 2022-01-31 RX ADMIN — Medication 0.1 MILLIGRAM(S): at 10:51

## 2022-01-31 RX ADMIN — Medication 2 MILLIGRAM(S): at 14:18

## 2022-01-31 RX ADMIN — Medication 1250 MILLIGRAM(S): at 23:50

## 2022-01-31 RX ADMIN — Medication 2 MILLIGRAM(S): at 13:33

## 2022-01-31 RX ADMIN — RISPERIDONE 3 MILLIGRAM(S): 4 TABLET ORAL at 23:50

## 2022-01-31 RX ADMIN — RISPERIDONE 3 MILLIGRAM(S): 4 TABLET ORAL at 10:52

## 2022-01-31 NOTE — BH INPATIENT PSYCHIATRY PROGRESS NOTE - NSBHCHARTREVIEWVS_PSY_A_CORE FT
Vital Signs Last 24 Hrs  T(C): 36.5 (01-30-22 @ 18:57), Max: 36.5 (01-30-22 @ 18:57)  T(F): 97.7 (01-30-22 @ 18:57), Max: 97.7 (01-30-22 @ 18:57)  HR: 120 (01-30-22 @ 13:10) (120 - 120)  BP: 117/85 (01-30-22 @ 13:10) (117/85 - 117/85)  BP(mean): --  RR: --  SpO2: --    Orthostatic VS  01-30-22 @ 08:28  Lying BP: --/-- HR: --  Sitting BP: 110/78 HR: 91  Standing BP: --/-- HR: --  Site: --  Mode: --

## 2022-01-31 NOTE — BH INPATIENT PSYCHIATRY PROGRESS NOTE - NSBHMETABOLIC_PSY_ALL_CORE_FT
BMI:   HbA1c: A1C with Estimated Average Glucose Result: 5.3 % (01-26-22 @ 10:04)    Glucose:   BP: 117/85 (01-30-22 @ 13:10) (117/85 - 117/85)  Lipid Panel: Date/Time: 01-26-22 @ 10:04  Cholesterol, Serum: 179  Direct LDL: --  HDL Cholesterol, Serum: 82  Total Cholesterol/HDL Ration Measurement: --  Triglycerides, Serum: 62

## 2022-01-31 NOTE — BH INPATIENT PSYCHIATRY PROGRESS NOTE - MSE UNSTRUCTURED FT
Dressed appropriately.  Good hygiene and grooming.  Calm and superficially cooperative.  No psychomotor slowing or activation noted.  No abnormal movements noted.  Oddly related, strong eye contact.  Speech regular rate, normal prosody, perseverative.  Mood is "ok," odd affect with inappropriate smiling.  Repetitive TP, limited associations.  TC: Poverty of TC. Denies AVH but appears internally preoccupied  Insight judgment and attention limited, language fluent, gait intact.

## 2022-02-01 PROCEDURE — 99231 SBSQ HOSP IP/OBS SF/LOW 25: CPT

## 2022-02-01 RX ADMIN — HALOPERIDOL DECANOATE 5 MILLIGRAM(S): 100 INJECTION INTRAMUSCULAR at 17:55

## 2022-02-01 RX ADMIN — RISPERIDONE 3 MILLIGRAM(S): 4 TABLET ORAL at 08:48

## 2022-02-01 RX ADMIN — Medication 0.1 MILLIGRAM(S): at 08:48

## 2022-02-01 RX ADMIN — Medication 30 MILLILITER(S): at 15:22

## 2022-02-01 RX ADMIN — Medication 50 MILLIGRAM(S): at 17:56

## 2022-02-01 RX ADMIN — Medication 0.1 MILLIGRAM(S): at 17:54

## 2022-02-01 NOTE — BH INPATIENT PSYCHIATRY PROGRESS NOTE - MSE UNSTRUCTURED FT
Dressed appropriately.  Good hygiene and grooming.  Calm and superficially cooperative.  No psychomotor slowing or activation noted.  No abnormal movements noted.  Oddly related, strong eye contact.  Speech regular rate, normal prosody, perseverative.  Mood is "ok," odd affect with inappropriate smiling.  Repetitive TP, limited associations.  TC: Poverty of TC. Denies AVH but appears internally preoccupied  Insight judgment and attention limited, language fluent, gait intact.  Dressed appropriately.  Good hygiene and grooming.  Calm and superficially cooperative, poor boundaries.  No psychomotor slowing or activation noted.  No abnormal movements noted.  Oddly related, fair eye contact.  Speech regular rate, normal prosody, perseverative.  Mood is "ok," odd affect with inappropriate smiling. perseverative TP, limited associations.  TC: Poverty of TC. Denies AVH ?possibly internally preoccupied  Insight judgment and attention limited, language fluent, gait intact.

## 2022-02-01 NOTE — BH INPATIENT PSYCHIATRY PROGRESS NOTE - NSBHCHARTREVIEWVS_PSY_A_CORE FT
Vital Signs Last 24 Hrs  T(C): 36.2 (02-01-22 @ 08:21), Max: 36.2 (02-01-22 @ 08:21)  T(F): 97.2 (02-01-22 @ 08:21), Max: 97.2 (02-01-22 @ 08:21)  HR: 94 (02-01-22 @ 08:21) (94 - 94)  BP: 135/72 (02-01-22 @ 08:21) (135/72 - 135/72)  BP(mean): --  RR: --  SpO2: --    Orthostatic VS  01-31-22 @ 10:30  Lying BP: --/-- HR: --  Sitting BP: 149/100 HR: 115  Standing BP: --/-- HR: --  Site: --  Mode: --

## 2022-02-01 NOTE — BH INPATIENT PSYCHIATRY PROGRESS NOTE - NSBHMETABOLIC_PSY_ALL_CORE_FT
BMI:   HbA1c: A1C with Estimated Average Glucose Result: 5.3 % (01-26-22 @ 10:04)    Glucose:   BP: 135/72 (02-01-22 @ 08:21) (117/85 - 135/72)  Lipid Panel: Date/Time: 01-26-22 @ 10:04  Cholesterol, Serum: 179  Direct LDL: --  HDL Cholesterol, Serum: 82  Total Cholesterol/HDL Ration Measurement: --  Triglycerides, Serum: 62

## 2022-02-02 PROCEDURE — 99231 SBSQ HOSP IP/OBS SF/LOW 25: CPT

## 2022-02-02 RX ORDER — DIPHENHYDRAMINE HCL 50 MG
50 CAPSULE ORAL ONCE
Refills: 0 | Status: DISCONTINUED | OUTPATIENT
Start: 2022-02-02 | End: 2022-02-02

## 2022-02-02 RX ORDER — RISPERIDONE 4 MG/1
3 TABLET ORAL
Refills: 0 | Status: DISCONTINUED | OUTPATIENT
Start: 2022-02-02 | End: 2022-02-10

## 2022-02-02 RX ORDER — DIVALPROEX SODIUM 500 MG/1
1250 TABLET, DELAYED RELEASE ORAL AT BEDTIME
Refills: 0 | Status: DISCONTINUED | OUTPATIENT
Start: 2022-02-02 | End: 2022-02-15

## 2022-02-02 RX ORDER — HALOPERIDOL DECANOATE 100 MG/ML
5 INJECTION INTRAMUSCULAR ONCE
Refills: 0 | Status: COMPLETED | OUTPATIENT
Start: 2022-02-02 | End: 2022-02-02

## 2022-02-02 RX ORDER — DIPHENHYDRAMINE HCL 50 MG
50 CAPSULE ORAL ONCE
Refills: 0 | Status: COMPLETED | OUTPATIENT
Start: 2022-02-02 | End: 2022-02-02

## 2022-02-02 RX ORDER — HALOPERIDOL DECANOATE 100 MG/ML
5 INJECTION INTRAMUSCULAR ONCE
Refills: 0 | Status: DISCONTINUED | OUTPATIENT
Start: 2022-02-02 | End: 2022-02-02

## 2022-02-02 RX ADMIN — Medication 0.1 MILLIGRAM(S): at 08:55

## 2022-02-02 RX ADMIN — Medication 50 MILLIGRAM(S): at 04:59

## 2022-02-02 RX ADMIN — Medication 50 MILLIGRAM(S): at 15:51

## 2022-02-02 RX ADMIN — HALOPERIDOL DECANOATE 5 MILLIGRAM(S): 100 INJECTION INTRAMUSCULAR at 15:51

## 2022-02-02 RX ADMIN — RISPERIDONE 3 MILLIGRAM(S): 4 TABLET ORAL at 08:55

## 2022-02-02 RX ADMIN — Medication 2 MILLIGRAM(S): at 15:51

## 2022-02-02 RX ADMIN — HALOPERIDOL DECANOATE 5 MILLIGRAM(S): 100 INJECTION INTRAMUSCULAR at 05:00

## 2022-02-02 RX ADMIN — Medication 0.1 MILLIGRAM(S): at 14:03

## 2022-02-02 RX ADMIN — Medication 2 MILLIGRAM(S): at 05:00

## 2022-02-02 NOTE — BH INPATIENT PSYCHIATRY PROGRESS NOTE - NSBHCHARTREVIEWVS_PSY_A_CORE FT
Vital Signs Last 24 Hrs  T(C): 36.2 (02-02-22 @ 07:00), Max: 36.2 (02-02-22 @ 07:00)  T(F): 97.1 (02-02-22 @ 07:00), Max: 97.1 (02-02-22 @ 07:00)  HR: --  BP: 128/68 (02-01-22 @ 13:03) (128/68 - 128/68)  BP(mean): --  RR: --  SpO2: --    Orthostatic VS  02-02-22 @ 07:00  Lying BP: --/-- HR: --  Sitting BP: 128/75 HR: 112  Standing BP: --/-- HR: --  Site: --  Mode: --

## 2022-02-02 NOTE — BH INPATIENT PSYCHIATRY PROGRESS NOTE - MSE UNSTRUCTURED FT
Dressed appropriately.  Good hygiene and grooming.  Calm and superficially cooperative, poor boundaries.  No psychomotor slowing or activation noted.  No abnormal movements noted.  Oddly related, fair eye contact.  Speech regular rate, normal prosody, perseverative.  Mood is "ok," odd affect with inappropriate smiling. perseverative TP, limited associations.  TC: Poverty of TC. Denies AVH.  Insight judgment and attention limited, language fluent, gait intact.

## 2022-02-02 NOTE — BH INPATIENT PSYCHIATRY PROGRESS NOTE - NSBHMETABOLIC_PSY_ALL_CORE_FT
BMI:   HbA1c: A1C with Estimated Average Glucose Result: 5.3 % (01-26-22 @ 10:04)    Glucose:   BP: 128/68 (02-01-22 @ 13:03) (117/85 - 135/72)  Lipid Panel: Date/Time: 01-26-22 @ 10:04  Cholesterol, Serum: 179  Direct LDL: --  HDL Cholesterol, Serum: 82  Total Cholesterol/HDL Ration Measurement: --  Triglycerides, Serum: 62

## 2022-02-03 RX ADMIN — Medication 0.1 MILLIGRAM(S): at 12:20

## 2022-02-03 RX ADMIN — Medication 2 MILLIGRAM(S): at 09:44

## 2022-02-03 NOTE — BH INPATIENT PSYCHIATRY PROGRESS NOTE - MSE UNSTRUCTURED FT
Dressed appropriately.  Good hygiene and grooming.  Calm and superficially cooperative, poor boundaries.  No psychomotor slowing or activation noted.  No abnormal movements noted.  Oddly related, fair eye contact.  Speech slow increased latency, perseverative.  Mood is "ok," odd affect with inappropriate smiling. perseverative TP, limited associations.  TC: Poverty of TC. Denies AVH.  Insight judgment and attention limited, language fluent, gait intact.

## 2022-02-03 NOTE — BH INPATIENT PSYCHIATRY PROGRESS NOTE - NSBHMETABOLIC_PSY_ALL_CORE_FT
BMI:   HbA1c: A1C with Estimated Average Glucose Result: 5.3 % (01-26-22 @ 10:04)    Glucose:   BP: 128/68 (02-01-22 @ 13:03) (128/68 - 135/72)  Lipid Panel: Date/Time: 01-26-22 @ 10:04  Cholesterol, Serum: 179  Direct LDL: --  HDL Cholesterol, Serum: 82  Total Cholesterol/HDL Ration Measurement: --  Triglycerides, Serum: 62

## 2022-02-03 NOTE — BH INPATIENT PSYCHIATRY PROGRESS NOTE - NSBHCHARTREVIEWVS_PSY_A_CORE FT
Vital Signs Last 24 Hrs  T(C): 36.3 (02-03-22 @ 08:13), Max: 36.4 (02-02-22 @ 19:12)  T(F): 97.3 (02-03-22 @ 08:13), Max: 97.5 (02-02-22 @ 19:12)  HR: --  BP: --  BP(mean): --  RR: --  SpO2: --    Orthostatic VS  02-03-22 @ 08:13  Lying BP: --/-- HR: --  Sitting BP: 113/80 HR: 100  Standing BP: --/-- HR: --  Site: --  Mode: --  Orthostatic VS  02-02-22 @ 13:53  Lying BP: --/-- HR: --  Sitting BP: 117/77 HR: 116  Standing BP: --/-- HR: --  Site: --  Mode: --  Orthostatic VS  02-02-22 @ 07:00  Lying BP: --/-- HR: --  Sitting BP: 128/75 HR: 112  Standing BP: --/-- HR: --  Site: --  Mode: --

## 2022-02-03 NOTE — BH INPATIENT PSYCHIATRY PROGRESS NOTE - NSBHINPTBILLING_PSY_ALL_CORE
[address]2005 - Inpatient Low Complexity Valtrex Counseling: I discussed with the patient the risks of valacyclovir including but not limited to kidney damage, nausea, vomiting and severe allergy.  The patient understands that if the infection seems to be worsening or is not improving, they are to call.

## 2022-02-04 PROCEDURE — 99231 SBSQ HOSP IP/OBS SF/LOW 25: CPT

## 2022-02-04 RX ADMIN — Medication 2 MILLIGRAM(S): at 09:28

## 2022-02-04 RX ADMIN — RISPERIDONE 3 MILLIGRAM(S): 4 TABLET ORAL at 09:34

## 2022-02-04 RX ADMIN — Medication 0.1 MILLIGRAM(S): at 09:34

## 2022-02-04 NOTE — BH INPATIENT PSYCHIATRY PROGRESS NOTE - NSBHCHARTREVIEWVS_PSY_A_CORE FT
Vital Signs Last 24 Hrs  T(C): 36.4 (02-04-22 @ 06:40), Max: 36.5 (02-03-22 @ 18:01)  T(F): 97.6 (02-04-22 @ 06:40), Max: 97.7 (02-03-22 @ 18:01)  HR: --  BP: --  BP(mean): --  RR: --  SpO2: --    Orthostatic VS  02-04-22 @ 06:40  Lying BP: --/-- HR: --  Sitting BP: 113/78 HR: 98  Standing BP: --/-- HR: --  Site: --  Mode: --  Orthostatic VS  02-03-22 @ 08:13  Lying BP: --/-- HR: --  Sitting BP: 113/80 HR: 100  Standing BP: --/-- HR: --  Site: --  Mode: --  Orthostatic VS  02-02-22 @ 13:53  Lying BP: --/-- HR: --  Sitting BP: 117/77 HR: 116  Standing BP: --/-- HR: --  Site: --  Mode: --

## 2022-02-04 NOTE — BH INPATIENT PSYCHIATRY PROGRESS NOTE - NSBHMETABOLIC_PSY_ALL_CORE_FT
BMI:   HbA1c: A1C with Estimated Average Glucose Result: 5.3 % (01-26-22 @ 10:04)    Glucose:   BP: 128/68 (02-01-22 @ 13:03) (128/68 - 128/68)  Lipid Panel: Date/Time: 01-26-22 @ 10:04  Cholesterol, Serum: 179  Direct LDL: --  HDL Cholesterol, Serum: 82  Total Cholesterol/HDL Ration Measurement: --  Triglycerides, Serum: 62   BMI:   HbA1c: A1C with Estimated Average Glucose Result: 5.3 % (01-26-22 @ 10:04)    Glucose:   BP: --  Lipid Panel: Date/Time: 01-26-22 @ 10:04  Cholesterol, Serum: 179  Direct LDL: --  HDL Cholesterol, Serum: 82  Total Cholesterol/HDL Ration Measurement: --  Triglycerides, Serum: 62

## 2022-02-05 RX ORDER — HALOPERIDOL DECANOATE 100 MG/ML
5 INJECTION INTRAMUSCULAR ONCE
Refills: 0 | Status: COMPLETED | OUTPATIENT
Start: 2022-02-05 | End: 2022-02-05

## 2022-02-05 RX ORDER — DIPHENHYDRAMINE HCL 50 MG
50 CAPSULE ORAL ONCE
Refills: 0 | Status: COMPLETED | OUTPATIENT
Start: 2022-02-05 | End: 2022-02-05

## 2022-02-05 RX ADMIN — Medication 2 MILLIGRAM(S): at 18:28

## 2022-02-05 RX ADMIN — Medication 50 MILLIGRAM(S): at 10:51

## 2022-02-05 RX ADMIN — Medication 2 MILLIGRAM(S): at 10:50

## 2022-02-05 RX ADMIN — HALOPERIDOL DECANOATE 5 MILLIGRAM(S): 100 INJECTION INTRAMUSCULAR at 10:51

## 2022-02-06 RX ORDER — DIPHENHYDRAMINE HCL 50 MG
50 CAPSULE ORAL ONCE
Refills: 0 | Status: COMPLETED | OUTPATIENT
Start: 2022-02-06 | End: 2022-02-06

## 2022-02-06 RX ORDER — HALOPERIDOL DECANOATE 100 MG/ML
5 INJECTION INTRAMUSCULAR ONCE
Refills: 0 | Status: COMPLETED | OUTPATIENT
Start: 2022-02-06 | End: 2022-02-06

## 2022-02-06 RX ADMIN — Medication 50 MILLIGRAM(S): at 14:51

## 2022-02-06 RX ADMIN — HALOPERIDOL DECANOATE 5 MILLIGRAM(S): 100 INJECTION INTRAMUSCULAR at 14:51

## 2022-02-06 RX ADMIN — Medication 0.1 MILLIGRAM(S): at 08:59

## 2022-02-06 RX ADMIN — RISPERIDONE 3 MILLIGRAM(S): 4 TABLET ORAL at 08:59

## 2022-02-06 RX ADMIN — Medication 2 MILLIGRAM(S): at 14:51

## 2022-02-07 PROCEDURE — 99231 SBSQ HOSP IP/OBS SF/LOW 25: CPT

## 2022-02-07 RX ORDER — HALOPERIDOL DECANOATE 100 MG/ML
5 INJECTION INTRAMUSCULAR ONCE
Refills: 0 | Status: COMPLETED | OUTPATIENT
Start: 2022-02-07 | End: 2022-02-07

## 2022-02-07 RX ORDER — DIPHENHYDRAMINE HCL 50 MG
50 CAPSULE ORAL ONCE
Refills: 0 | Status: COMPLETED | OUTPATIENT
Start: 2022-02-07 | End: 2022-02-07

## 2022-02-07 RX ADMIN — Medication 50 MILLIGRAM(S): at 11:50

## 2022-02-07 RX ADMIN — RISPERIDONE 3 MILLIGRAM(S): 4 TABLET ORAL at 20:25

## 2022-02-07 RX ADMIN — Medication 0.1 MILLIGRAM(S): at 20:24

## 2022-02-07 RX ADMIN — DIVALPROEX SODIUM 1250 MILLIGRAM(S): 500 TABLET, DELAYED RELEASE ORAL at 20:24

## 2022-02-07 RX ADMIN — HALOPERIDOL DECANOATE 5 MILLIGRAM(S): 100 INJECTION INTRAMUSCULAR at 20:24

## 2022-02-07 RX ADMIN — Medication 50 MILLIGRAM(S): at 20:24

## 2022-02-07 RX ADMIN — Medication 1 MILLIGRAM(S): at 20:25

## 2022-02-07 RX ADMIN — HALOPERIDOL DECANOATE 5 MILLIGRAM(S): 100 INJECTION INTRAMUSCULAR at 11:50

## 2022-02-07 RX ADMIN — Medication 2 MILLIGRAM(S): at 11:50

## 2022-02-07 NOTE — BH INPATIENT PSYCHIATRY PROGRESS NOTE - NSBHMETABOLIC_PSY_ALL_CORE_FT
BMI:   HbA1c: A1C with Estimated Average Glucose Result: 5.3 % (01-26-22 @ 10:04)    Glucose:   BP: 123/84 (02-06-22 @ 20:59) (116/73 - 127/85)  Lipid Panel: Date/Time: 01-26-22 @ 10:04  Cholesterol, Serum: 179  Direct LDL: --  HDL Cholesterol, Serum: 82  Total Cholesterol/HDL Ration Measurement: --  Triglycerides, Serum: 62

## 2022-02-07 NOTE — BH INPATIENT PSYCHIATRY PROGRESS NOTE - MSE UNSTRUCTURED FT
The patient appears stated age, with fair hygiene, and is dressed appropriately.  He was calm but minimally cooperative with the interview, intrusive at times on the unit.  He maintained appropriate eye contact.  No restlessness or slowing of movements observed.  Gait is steady.  The patient’s speech was fluent, but mumbled at times, difficult to understand at times.  Irritable in tone, normal rate and volume.  The patient’s mood is “fine.”  His affect is constricted, but rather labile.  The patient’s thoughts are disorganized, tangential, and loose at times.  He has paranoid delusions, denies hallucinations.  He does not have any suicidal or homicidal ideation, intent, or plan.  Insight is poor.  Judgment is impaired.  Impulse control has been tenuous on the unit.

## 2022-02-07 NOTE — BH INPATIENT PSYCHIATRY PROGRESS NOTE - NSBHCHARTREVIEWVS_PSY_A_CORE FT
Vital Signs Last 24 Hrs  T(C): 36.8 (02-07-22 @ 08:43), Max: 36.8 (02-07-22 @ 08:43)  T(F): 98.3 (02-07-22 @ 08:43), Max: 98.3 (02-07-22 @ 08:43)  HR: --  BP: 123/84 (02-06-22 @ 20:59) (123/84 - 123/84)  BP(mean): 105 (02-06-22 @ 20:59) (105 - 105)  RR: --  SpO2: --    Orthostatic VS  02-07-22 @ 08:43  Lying BP: --/-- HR: --  Sitting BP: 115/85 HR: 82  Standing BP: --/-- HR: --  Site: --  Mode: --

## 2022-02-08 LAB — SARS-COV-2 RNA SPEC QL NAA+PROBE: SIGNIFICANT CHANGE UP

## 2022-02-08 PROCEDURE — 99231 SBSQ HOSP IP/OBS SF/LOW 25: CPT

## 2022-02-08 RX ORDER — DIPHENHYDRAMINE HCL 50 MG
50 CAPSULE ORAL ONCE
Refills: 0 | Status: COMPLETED | OUTPATIENT
Start: 2022-02-08 | End: 2022-02-08

## 2022-02-08 RX ORDER — HALOPERIDOL DECANOATE 100 MG/ML
5 INJECTION INTRAMUSCULAR ONCE
Refills: 0 | Status: COMPLETED | OUTPATIENT
Start: 2022-02-08 | End: 2022-02-08

## 2022-02-08 RX ADMIN — HALOPERIDOL DECANOATE 5 MILLIGRAM(S): 100 INJECTION INTRAMUSCULAR at 12:46

## 2022-02-08 RX ADMIN — RISPERIDONE 3 MILLIGRAM(S): 4 TABLET ORAL at 21:38

## 2022-02-08 RX ADMIN — Medication 50 MILLIGRAM(S): at 12:46

## 2022-02-08 RX ADMIN — Medication 1 MILLIGRAM(S): at 21:37

## 2022-02-08 RX ADMIN — DIVALPROEX SODIUM 1250 MILLIGRAM(S): 500 TABLET, DELAYED RELEASE ORAL at 21:38

## 2022-02-08 RX ADMIN — Medication 2 MILLIGRAM(S): at 12:46

## 2022-02-08 RX ADMIN — Medication 0.1 MILLIGRAM(S): at 21:38

## 2022-02-08 NOTE — BH INPATIENT PSYCHIATRY PROGRESS NOTE - NSBHMETABOLIC_PSY_ALL_CORE_FT
BMI:   HbA1c: A1C with Estimated Average Glucose Result: 5.3 % (01-26-22 @ 10:04)    Glucose:   BP: 123/84 (02-06-22 @ 20:59) (123/84 - 127/85)  Lipid Panel: Date/Time: 01-26-22 @ 10:04  Cholesterol, Serum: 179  Direct LDL: --  HDL Cholesterol, Serum: 82  Total Cholesterol/HDL Ration Measurement: --  Triglycerides, Serum: 62

## 2022-02-08 NOTE — BH INPATIENT PSYCHIATRY PROGRESS NOTE - NSBHCHARTREVIEWVS_PSY_A_CORE FT
Vital Signs Last 24 Hrs  T(C): 36.6 (02-08-22 @ 07:51), Max: 37.1 (02-07-22 @ 19:47)  T(F): 97.8 (02-08-22 @ 07:51), Max: 98.7 (02-07-22 @ 19:47)  HR: --  BP: --  BP(mean): --  RR: --  SpO2: --    Orthostatic VS  02-08-22 @ 07:51  Lying BP: --/-- HR: --  Sitting BP: 105/65 HR: 71  Standing BP: --/-- HR: --  Site: --  Mode: --  Orthostatic VS  02-07-22 @ 08:43  Lying BP: --/-- HR: --  Sitting BP: 115/85 HR: 82  Standing BP: --/-- HR: --  Site: --  Mode: --

## 2022-02-08 NOTE — BH INPATIENT PSYCHIATRY PROGRESS NOTE - MSE UNSTRUCTURED FT
The patient appears stated age, with fair hygiene, and is dressed appropriately.  He was calm, superficially cooperative with the interview.  He maintained appropriate eye contact.  No restlessness or slowing of movements observed.  Gait is steady.  The patient’s speech was fluent, but mumbled at times, difficult to understand at times.  Irritable in tone, normal rate and volume.  The patient’s mood is “not good.”  His affect is constricted, but labile, quick to anger.  The patient’s thoughts are disorganized, tangential, and loose at times.  He has delusional thinking, denies hallucinations.  He does not have any suicidal or homicidal ideation, intent, or plan.  Insight is poor.  Judgment is impaired.  Impulse control has been tenuous on the unit.

## 2022-02-09 PROCEDURE — 99231 SBSQ HOSP IP/OBS SF/LOW 25: CPT

## 2022-02-09 RX ADMIN — Medication 1 MILLIGRAM(S): at 20:55

## 2022-02-09 RX ADMIN — Medication 0.1 MILLIGRAM(S): at 09:10

## 2022-02-09 RX ADMIN — RISPERIDONE 3 MILLIGRAM(S): 4 TABLET ORAL at 20:55

## 2022-02-09 RX ADMIN — DIVALPROEX SODIUM 1250 MILLIGRAM(S): 500 TABLET, DELAYED RELEASE ORAL at 20:55

## 2022-02-09 RX ADMIN — RISPERIDONE 3 MILLIGRAM(S): 4 TABLET ORAL at 09:10

## 2022-02-09 NOTE — BH INPATIENT PSYCHIATRY PROGRESS NOTE - NSBHCHARTREVIEWVS_PSY_A_CORE FT
Vital Signs Last 24 Hrs  T(C): 36.1 (02-09-22 @ 06:18), Max: 36.7 (02-08-22 @ 18:23)  T(F): 97 (02-09-22 @ 06:18), Max: 98.1 (02-08-22 @ 18:23)  HR: 94 (02-09-22 @ 13:09) (90 - 97)  BP: 118/79 (02-09-22 @ 13:09) (115/76 - 128/90)  BP(mean): --  RR: --  SpO2: --    Orthostatic VS  02-08-22 @ 07:51  Lying BP: --/-- HR: --  Sitting BP: 105/65 HR: 71  Standing BP: --/-- HR: --  Site: --  Mode: --

## 2022-02-09 NOTE — BH INPATIENT PSYCHIATRY PROGRESS NOTE - MSE UNSTRUCTURED FT
The patient appears stated age, with fair hygiene, and is dressed appropriately.  He was calm, more cooperative with the interview today.  He maintained appropriate eye contact.  No restlessness or slowing of movements observed.  Gait is steady.  The patient’s speech was fluent, but mumbled at times, normal in tone, rate and volume.  The patient’s mood is “okay.”  His affect is constricted, more stable today.  The patient’s thoughts are more organized today.  He has delusional thinking, admits to hearing the voice of God.  He does not have any suicidal or homicidal ideation, intent, or plan.  Insight is poor.  Judgment is impaired.  Impulse control has been tenuous on the unit.

## 2022-02-09 NOTE — BH INPATIENT PSYCHIATRY PROGRESS NOTE - NSBHMETABOLIC_PSY_ALL_CORE_FT
BMI:   HbA1c: A1C with Estimated Average Glucose Result: 5.3 % (01-26-22 @ 10:04)    Glucose:   BP: 118/79 (02-09-22 @ 13:09) (115/76 - 128/90)  Lipid Panel: Date/Time: 01-26-22 @ 10:04  Cholesterol, Serum: 179  Direct LDL: --  HDL Cholesterol, Serum: 82  Total Cholesterol/HDL Ration Measurement: --  Triglycerides, Serum: 62

## 2022-02-10 PROCEDURE — 99231 SBSQ HOSP IP/OBS SF/LOW 25: CPT

## 2022-02-10 RX ORDER — RISPERIDONE 4 MG/1
4 TABLET ORAL AT BEDTIME
Refills: 0 | Status: DISCONTINUED | OUTPATIENT
Start: 2022-02-10 | End: 2022-02-15

## 2022-02-10 RX ORDER — RISPERIDONE 4 MG/1
2 TABLET ORAL DAILY
Refills: 0 | Status: DISCONTINUED | OUTPATIENT
Start: 2022-02-10 | End: 2022-02-15

## 2022-02-10 RX ADMIN — Medication 2 MILLIGRAM(S): at 13:38

## 2022-02-10 NOTE — BH TREATMENT PLAN - NSPTSTATEDGOAL_PSY_ALL_CORE
to go to [place] 
" I tried to fly out of town, I need help with my meds"

## 2022-02-10 NOTE — BH INPATIENT PSYCHIATRY PROGRESS NOTE - NSBHMETABOLIC_PSY_ALL_CORE_FT
BMI:   HbA1c: A1C with Estimated Average Glucose Result: 5.3 % (01-26-22 @ 10:04)    Glucose:   BP: 119/70 (02-10-22 @ 08:32) (115/76 - 128/90)  Lipid Panel: Date/Time: 01-26-22 @ 10:04  Cholesterol, Serum: 179  Direct LDL: --  HDL Cholesterol, Serum: 82  Total Cholesterol/HDL Ration Measurement: --  Triglycerides, Serum: 62

## 2022-02-10 NOTE — BH INPATIENT PSYCHIATRY PROGRESS NOTE - MSE UNSTRUCTURED FT
The patient appears stated age, with fair hygiene, and is dressed appropriately.  He was calm, cooperative with the interview.  He maintained appropriate eye contact.  No restlessness or slowing of movements observed.  Gait is steady.  The patient’s speech was fluent, but mumbled at times, normal in tone, rate and volume.  The patient’s mood is “okay.”  His affect is constricted, more stable, appropriate.  The patient’s thoughts are more organized, but illogical.  He has delusional thinking, admits to hearing the voice of God.  He does not have any suicidal or homicidal ideation, intent, or plan.  Insight is poor.  Judgment is impaired.  Impulse control has been tenuous on the unit.

## 2022-02-10 NOTE — BH INPATIENT PSYCHIATRY PROGRESS NOTE - NSBHCHARTREVIEWVS_PSY_A_CORE FT
Vital Signs Last 24 Hrs  T(C): 37.1 (02-10-22 @ 08:32), Max: 37.1 (02-10-22 @ 08:32)  T(F): 98.8 (02-10-22 @ 08:32), Max: 98.8 (02-10-22 @ 08:32)  HR: 93 (02-10-22 @ 08:32) (93 - 93)  BP: 119/70 (02-10-22 @ 08:32) (119/70 - 119/70)  BP(mean): --  RR: --  SpO2: --

## 2022-02-11 NOTE — BH INPATIENT PSYCHIATRY PROGRESS NOTE - NSBHCHARTREVIEWVS_PSY_A_CORE FT
Vital Signs Last 24 Hrs  T(C): 36.7 (02-11-22 @ 08:10), Max: 36.7 (02-11-22 @ 08:10)  T(F): 98.1 (02-11-22 @ 08:10), Max: 98.1 (02-11-22 @ 08:10)  HR: --  BP: 107/75 (02-11-22 @ 08:10) (107/75 - 107/75)  BP(mean): 81 (02-11-22 @ 08:10) (81 - 81)  RR: --  SpO2: --    Orthostatic VS  02-10-22 @ 20:07  Lying BP: --/-- HR: --  Sitting BP: 120/73 HR: 94  Standing BP: --/-- HR: --  Site: --  Mode: --

## 2022-02-11 NOTE — BH INPATIENT PSYCHIATRY PROGRESS NOTE - MSE UNSTRUCTURED FT
The patient appears stated age, with fair hygiene, and is dressed appropriately.  He was calm, cooperative with the interview.  He maintained appropriate eye contact.  No restlessness or slowing of movements observed.  Gait is steady.  The patient’s speech was fluent, but mumbled at times, normal in tone, rate and volume.  The patient’s mood is “fine.”  His affect is constricted, stable, appropriate.  The patient’s thoughts are more organized, but illogical.  He has delusional thinking, admits to hearing the voice of God.  He does not have any suicidal or homicidal ideation, intent, or plan.  Insight is poor.  Judgment is impaired.  Impulse control has been tenuous on the unit.

## 2022-02-11 NOTE — MEDICAL LEGAL COORDINATOR PROGRESS NOTE - COMMENTS
I served patient with an "Order to Show Cause" where as the hospital is seeking to Medicate patient over their objection. I also placed a copy in the patient chart.

## 2022-02-11 NOTE — BH INPATIENT PSYCHIATRY PROGRESS NOTE - NSBHMETABOLIC_PSY_ALL_CORE_FT
BMI:   HbA1c: A1C with Estimated Average Glucose Result: 5.3 % (01-26-22 @ 10:04)    Glucose:   BP: 107/75 (02-11-22 @ 08:10) (107/75 - 128/90)  Lipid Panel: Date/Time: 01-26-22 @ 10:04  Cholesterol, Serum: 179  Direct LDL: --  HDL Cholesterol, Serum: 82  Total Cholesterol/HDL Ration Measurement: --  Triglycerides, Serum: 62

## 2022-02-12 LAB — SARS-COV-2 RNA SPEC QL NAA+PROBE: SIGNIFICANT CHANGE UP

## 2022-02-12 RX ORDER — HALOPERIDOL DECANOATE 100 MG/ML
5 INJECTION INTRAMUSCULAR ONCE
Refills: 0 | Status: COMPLETED | OUTPATIENT
Start: 2022-02-12 | End: 2022-02-12

## 2022-02-12 RX ORDER — DIPHENHYDRAMINE HCL 50 MG
50 CAPSULE ORAL ONCE
Refills: 0 | Status: COMPLETED | OUTPATIENT
Start: 2022-02-12 | End: 2022-02-12

## 2022-02-12 RX ADMIN — Medication 2 MILLIGRAM(S): at 11:57

## 2022-02-12 RX ADMIN — Medication 50 MILLIGRAM(S): at 11:57

## 2022-02-12 RX ADMIN — HALOPERIDOL DECANOATE 5 MILLIGRAM(S): 100 INJECTION INTRAMUSCULAR at 11:57

## 2022-02-13 RX ORDER — HALOPERIDOL DECANOATE 100 MG/ML
5 INJECTION INTRAMUSCULAR ONCE
Refills: 0 | Status: COMPLETED | OUTPATIENT
Start: 2022-02-13 | End: 2022-02-13

## 2022-02-13 RX ORDER — DIPHENHYDRAMINE HCL 50 MG
50 CAPSULE ORAL ONCE
Refills: 0 | Status: COMPLETED | OUTPATIENT
Start: 2022-02-13 | End: 2022-02-13

## 2022-02-13 RX ADMIN — HALOPERIDOL DECANOATE 5 MILLIGRAM(S): 100 INJECTION INTRAMUSCULAR at 10:09

## 2022-02-13 RX ADMIN — Medication 2 MILLIGRAM(S): at 20:00

## 2022-02-13 RX ADMIN — Medication 50 MILLIGRAM(S): at 20:00

## 2022-02-13 RX ADMIN — Medication 2 MILLIGRAM(S): at 10:09

## 2022-02-13 RX ADMIN — Medication 50 MILLIGRAM(S): at 10:09

## 2022-02-13 RX ADMIN — HALOPERIDOL DECANOATE 5 MILLIGRAM(S): 100 INJECTION INTRAMUSCULAR at 20:00
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
## 2022-02-14 PROCEDURE — 99231 SBSQ HOSP IP/OBS SF/LOW 25: CPT

## 2022-02-14 RX ORDER — CHLORPROMAZINE HCL 10 MG
50 TABLET ORAL ONCE
Refills: 0 | Status: DISCONTINUED | OUTPATIENT
Start: 2022-02-14 | End: 2022-02-15

## 2022-02-14 RX ORDER — CHLORPROMAZINE HCL 10 MG
50 TABLET ORAL ONCE
Refills: 0 | Status: COMPLETED | OUTPATIENT
Start: 2022-02-14 | End: 2022-02-14

## 2022-02-14 RX ORDER — CHLORPROMAZINE HCL 10 MG
50 TABLET ORAL EVERY 4 HOURS
Refills: 0 | Status: DISCONTINUED | OUTPATIENT
Start: 2022-02-14 | End: 2022-02-15

## 2022-02-14 RX ADMIN — Medication 2 MILLIGRAM(S): at 15:45

## 2022-02-14 RX ADMIN — Medication 50 MILLIGRAM(S): at 15:45

## 2022-02-14 NOTE — BH INPATIENT PSYCHIATRY PROGRESS NOTE - MSE UNSTRUCTURED FT
The patient appears stated age, with fair hygiene, and is dressed appropriately.  He was calm, cooperative with the interview.  He maintained appropriate eye contact.  No restlessness or slowing of movements observed.  Gait is steady.  The patient’s speech was fluent, but mumbled at times, normal in tone, rate and volume.  The patient’s mood is “ok.”  His affect is constricted, incongruent with mood.  The patient’s thoughts perseverative, illogical.  He has delusional thinking, admits to hearing the voice of God.  He does not have any suicidal or homicidal ideation, intent, or plan.  Insight is poor.  Judgment is impaired.  Impulse control has been tenuously poor on the unit.

## 2022-02-14 NOTE — BH INPATIENT PSYCHIATRY PROGRESS NOTE - NSBHMETABOLIC_PSY_ALL_CORE_FT
BMI:   HbA1c: A1C with Estimated Average Glucose Result: 5.3 % (01-26-22 @ 10:04)    Glucose:   BP: 101/167 (02-13-22 @ 08:34) (101/167 - 101/167)  Lipid Panel: Date/Time: 01-26-22 @ 10:04  Cholesterol, Serum: 179  Direct LDL: --  HDL Cholesterol, Serum: 82  Total Cholesterol/HDL Ration Measurement: --  Triglycerides, Serum: 62

## 2022-02-14 NOTE — BH INPATIENT PSYCHIATRY PROGRESS NOTE - NSBHCHARTREVIEWVS_PSY_A_CORE FT
Vital Signs Last 24 Hrs  T(C): 36.7 (02-13-22 @ 18:48), Max: 36.7 (02-13-22 @ 18:48)  T(F): 98.1 (02-13-22 @ 18:48), Max: 98.1 (02-13-22 @ 18:48)  HR: --  BP: --  BP(mean): --  RR: --  SpO2: --    Orthostatic VS  02-12-22 @ 20:15  Lying BP: --/-- HR: --  Sitting BP: 105/71 HR: 104  Standing BP: --/-- HR: --  Site: --  Mode: --

## 2022-02-15 PROCEDURE — 99231 SBSQ HOSP IP/OBS SF/LOW 25: CPT

## 2022-02-15 RX ORDER — RISPERIDONE 4 MG/1
1 TABLET ORAL
Refills: 0 | Status: DISCONTINUED | OUTPATIENT
Start: 2022-02-15 | End: 2022-02-22

## 2022-02-15 RX ORDER — HALOPERIDOL DECANOATE 100 MG/ML
5 INJECTION INTRAMUSCULAR EVERY 4 HOURS
Refills: 0 | Status: DISCONTINUED | OUTPATIENT
Start: 2022-02-15 | End: 2022-02-22

## 2022-02-15 RX ORDER — PALIPERIDONE 1.5 MG/1
234 TABLET, EXTENDED RELEASE ORAL ONCE
Refills: 0 | Status: COMPLETED | OUTPATIENT
Start: 2022-02-16 | End: 2022-02-16

## 2022-02-15 RX ORDER — HALOPERIDOL DECANOATE 100 MG/ML
5 INJECTION INTRAMUSCULAR ONCE
Refills: 0 | Status: DISCONTINUED | OUTPATIENT
Start: 2022-02-15 | End: 2022-02-22

## 2022-02-15 RX ORDER — DIVALPROEX SODIUM 500 MG/1
1250 TABLET, DELAYED RELEASE ORAL AT BEDTIME
Refills: 0 | Status: DISCONTINUED | OUTPATIENT
Start: 2022-02-15 | End: 2022-02-22

## 2022-02-15 RX ORDER — HALOPERIDOL DECANOATE 100 MG/ML
5 INJECTION INTRAMUSCULAR ONCE
Refills: 0 | Status: COMPLETED | OUTPATIENT
Start: 2022-02-15 | End: 2022-02-15

## 2022-02-15 RX ORDER — CHLORPROMAZINE HCL 10 MG
50 TABLET ORAL ONCE
Refills: 0 | Status: COMPLETED | OUTPATIENT
Start: 2022-02-15 | End: 2022-02-15

## 2022-02-15 RX ADMIN — Medication 50 MILLIGRAM(S): at 13:47

## 2022-02-15 RX ADMIN — HALOPERIDOL DECANOATE 5 MILLIGRAM(S): 100 INJECTION INTRAMUSCULAR at 22:12

## 2022-02-15 RX ADMIN — Medication 2 MILLIGRAM(S): at 13:48

## 2022-02-15 NOTE — BH INPATIENT PSYCHIATRY PROGRESS NOTE - NSBHCHARTREVIEWVS_PSY_A_CORE FT
Vital Signs Last 24 Hrs  T(C): 36.9 (02-15-22 @ 08:43), Max: 37 (02-14-22 @ 18:06)  T(F): 98.4 (02-15-22 @ 08:43), Max: 98.6 (02-14-22 @ 18:06)  HR: --  BP: 106/70 (02-14-22 @ 20:40) (106/70 - 106/70)  BP(mean): 78 (02-14-22 @ 20:40) (78 - 78)  RR: --  SpO2: --

## 2022-02-15 NOTE — BH INPATIENT PSYCHIATRY PROGRESS NOTE - NSBHMETABOLIC_PSY_ALL_CORE_FT
BMI:   HbA1c: A1C with Estimated Average Glucose Result: 5.3 % (01-26-22 @ 10:04)    Glucose:   BP: 106/70 (02-14-22 @ 20:40) (101/167 - 106/70)  Lipid Panel: Date/Time: 01-26-22 @ 10:04  Cholesterol, Serum: 179  Direct LDL: --  HDL Cholesterol, Serum: 82  Total Cholesterol/HDL Ration Measurement: --  Triglycerides, Serum: 62

## 2022-02-15 NOTE — BH INPATIENT PSYCHIATRY PROGRESS NOTE - MSE UNSTRUCTURED FT
The patient appears stated age, with fair hygiene, and is dressed appropriately.  He was calm, cooperative with the interview.  He maintained appropriate eye contact.  No restlessness or slowing of movements observed.  Gait is steady.  The patient’s speech was fluent, but mumbled at times, normal in tone, rate and volume.  The patient’s mood is “fine”  His affect is constricted, incongruent with mood.  The patient’s thoughts perseverative, illogical.  He has delusional thinking, denies AV / VH today.  He does not have any suicidal or homicidal ideation, intent, or plan.  Insight is poor.  Judgment is impaired.  Impulse control has been tenuously poor on the unit.

## 2022-02-16 PROCEDURE — 99231 SBSQ HOSP IP/OBS SF/LOW 25: CPT

## 2022-02-16 RX ORDER — HALOPERIDOL DECANOATE 100 MG/ML
5 INJECTION INTRAMUSCULAR ONCE
Refills: 0 | Status: COMPLETED | OUTPATIENT
Start: 2022-02-16 | End: 2022-02-16

## 2022-02-16 RX ADMIN — Medication 1 MILLIGRAM(S): at 21:55

## 2022-02-16 RX ADMIN — PALIPERIDONE 234 MILLIGRAM(S): 1.5 TABLET, EXTENDED RELEASE ORAL at 09:37

## 2022-02-16 RX ADMIN — HALOPERIDOL DECANOATE 5 MILLIGRAM(S): 100 INJECTION INTRAMUSCULAR at 09:53

## 2022-02-16 RX ADMIN — Medication 2 MILLIGRAM(S): at 09:53

## 2022-02-16 RX ADMIN — DIVALPROEX SODIUM 1250 MILLIGRAM(S): 500 TABLET, DELAYED RELEASE ORAL at 21:54

## 2022-02-16 RX ADMIN — RISPERIDONE 1 MILLIGRAM(S): 4 TABLET ORAL at 21:55

## 2022-02-16 RX ADMIN — Medication 0.1 MILLIGRAM(S): at 21:54

## 2022-02-16 NOTE — BH INPATIENT PSYCHIATRY PROGRESS NOTE - NSBHMETABOLIC_PSY_ALL_CORE_FT
BMI:   HbA1c: A1C with Estimated Average Glucose Result: 5.3 % (01-26-22 @ 10:04)    Glucose:   BP: 104/56 (02-16-22 @ 08:36) (104/56 - 106/70)  Lipid Panel: Date/Time: 01-26-22 @ 10:04  Cholesterol, Serum: 179  Direct LDL: --  HDL Cholesterol, Serum: 82  Total Cholesterol/HDL Ration Measurement: --  Triglycerides, Serum: 62

## 2022-02-16 NOTE — BH INPATIENT PSYCHIATRY PROGRESS NOTE - NSBHCHARTREVIEWVS_PSY_A_CORE FT
Vital Signs Last 24 Hrs  T(C): 36.7 (02-16-22 @ 08:36), Max: 36.7 (02-16-22 @ 08:36)  T(F): 98 (02-16-22 @ 08:36), Max: 98 (02-16-22 @ 08:36)  HR: --  BP: 104/56 (02-16-22 @ 08:36) (104/56 - 104/56)  BP(mean): 94 (02-16-22 @ 08:36) (94 - 94)  RR: --  SpO2: --

## 2022-02-16 NOTE — BH INPATIENT PSYCHIATRY PROGRESS NOTE - MSE UNSTRUCTURED FT
The patient appears stated age, with fair hygiene, and is dressed appropriately.  He was superficially calm.  He maintained appropriate eye contact.  No restlessness or slowing of movements observed.  Gait is steady.  The patient’s speech was fluent, but mumbled at times, normal in tone, rate and volume.  The patient’s mood is “ok"  His affect is constricted, incongruent with mood.  The patient’s thoughts perseverative, illogical.  He has delusional thinking, denies AV / VH today.  He does not have any suicidal or homicidal ideation, intent, or plan.  Insight is poor.  Judgment is impaired.  Impulse control has been tenuously poor on the unit.

## 2022-02-17 PROCEDURE — 99231 SBSQ HOSP IP/OBS SF/LOW 25: CPT

## 2022-02-17 RX ORDER — HALOPERIDOL DECANOATE 100 MG/ML
5 INJECTION INTRAMUSCULAR ONCE
Refills: 0 | Status: COMPLETED | OUTPATIENT
Start: 2022-02-17 | End: 2022-02-17

## 2022-02-17 RX ADMIN — DIVALPROEX SODIUM 1250 MILLIGRAM(S): 500 TABLET, DELAYED RELEASE ORAL at 22:10

## 2022-02-17 RX ADMIN — RISPERIDONE 1 MILLIGRAM(S): 4 TABLET ORAL at 22:11

## 2022-02-17 RX ADMIN — Medication 0.1 MILLIGRAM(S): at 08:49

## 2022-02-17 RX ADMIN — Medication 50 MILLIGRAM(S): at 20:33

## 2022-02-17 RX ADMIN — Medication 1 MILLIGRAM(S): at 20:33

## 2022-02-17 RX ADMIN — Medication 50 MILLIGRAM(S): at 13:48

## 2022-02-17 RX ADMIN — Medication 0.1 MILLIGRAM(S): at 13:47

## 2022-02-17 RX ADMIN — Medication 0.1 MILLIGRAM(S): at 20:33

## 2022-02-17 RX ADMIN — Medication 2 MILLIGRAM(S): at 11:28

## 2022-02-17 RX ADMIN — HALOPERIDOL DECANOATE 5 MILLIGRAM(S): 100 INJECTION INTRAMUSCULAR at 11:28

## 2022-02-17 RX ADMIN — RISPERIDONE 1 MILLIGRAM(S): 4 TABLET ORAL at 08:49

## 2022-02-17 NOTE — BH INPATIENT PSYCHIATRY PROGRESS NOTE - NSBHCHARTREVIEWVS_PSY_A_CORE FT
Vital Signs Last 24 Hrs  T(C): 36.4 (02-17-22 @ 07:50), Max: 37 (02-16-22 @ 16:55)  T(F): 97.5 (02-17-22 @ 07:50), Max: 98.6 (02-16-22 @ 16:55)  HR: --  BP: --  BP(mean): --  RR: 16 (02-16-22 @ 20:25) (16 - 16)  SpO2: --    Orthostatic VS  02-17-22 @ 07:50  Lying BP: --/-- HR: --  Sitting BP: 104/77 HR: 113  Standing BP: --/-- HR: --  Site: --  Mode: --  Orthostatic VS  02-16-22 @ 20:25  Lying BP: --/-- HR: --  Sitting BP: 115/68 HR: 98  Standing BP: 118/71 HR: 101  Site: --  Mode: --

## 2022-02-17 NOTE — BH INPATIENT PSYCHIATRY PROGRESS NOTE - MSE UNSTRUCTURED FT
The patient appears stated age, with fair hygiene, and is dressed appropriately.  He was superficially calm.  He maintained appropriate eye contact.  No restlessness or slowing of movements observed.  Gait is steady.  The patient’s speech was fluent, but mumbled at times, normal in tone, rate and volume.  The patient’s mood is “fine"  His affect is constricted, incongruent with mood.  The patient’s thoughts perseverative, slight improvement in organization.  He has delusional thinking, denies AV / VH today.  He does not have any suicidal or homicidal ideation, intent, or plan.  Insight is poor.  Judgment is limited.  Impulse control fair over this interval

## 2022-02-18 PROCEDURE — 99231 SBSQ HOSP IP/OBS SF/LOW 25: CPT

## 2022-02-18 RX ADMIN — Medication 0.1 MILLIGRAM(S): at 13:02

## 2022-02-18 RX ADMIN — RISPERIDONE 1 MILLIGRAM(S): 4 TABLET ORAL at 09:41

## 2022-02-18 RX ADMIN — Medication 1 MILLIGRAM(S): at 21:22

## 2022-02-18 RX ADMIN — Medication 0.1 MILLIGRAM(S): at 09:41

## 2022-02-18 NOTE — BH INPATIENT PSYCHIATRY PROGRESS NOTE - NSBHMETABOLIC_PSY_ALL_CORE_FT
BMI:   HbA1c: A1C with Estimated Average Glucose Result: 5.3 % (01-26-22 @ 10:04)    Glucose:   BP: 110/61 (02-17-22 @ 13:46) (104/56 - 110/61)  Lipid Panel: Date/Time: 01-26-22 @ 10:04  Cholesterol, Serum: 179  Direct LDL: --  HDL Cholesterol, Serum: 82  Total Cholesterol/HDL Ration Measurement: --  Triglycerides, Serum: 62

## 2022-02-18 NOTE — BH TREATMENT PLAN - NSTXMEDICGOAL_PSY_ALL_CORE
Take all medications as prescribed

## 2022-02-18 NOTE — BH TREATMENT PLAN - NSTXDCOPNOINTERRN_PSY_ALL_CORE
Nurse will educate the pt on the importance of medication compliance and following up with outpatient care.
Healthcare team will collaborate with the patient in order to ensure safe discharge

## 2022-02-18 NOTE — BH TREATMENT PLAN - NSTXPLANTHERAPYSESSIONSFT_PSY_ALL_CORE
02-14-22  Type of therapy: Leisure development,Peer advocate,Psychoeducation,Spirituality  Type of session: Individual  Level of patient participation: Engaged  Duration of participation: 15 minutes  Therapy conducted by: Psych rehab  Therapy Summary: Writer met with patient to assess patients progress towards psychiatric rehabilitation goal over the past seven days. Patient was polite upon approach. Over the past seven days, patient has been working on the goal of taking all medications as prescribed. Patient continues to refuse prescribed medications. Writer attempted to provide patient education regarding the benefits of his prescribed medications, however patient continues to express disinterest in taking prescribed medications. Patient would benefit from continuing to work on his goal for the next seven days. Patient has attended approximately 50 percent of psychiatric rehabilitation groups over the past seven days. Patient continues to demonstrate difficulty tolerating group structure which is evident by patient coming and going during the session and not being able to be redirected. Over the past seven days, patient has been observed to engage in provocative behaviors, which led to psychiatric emergencies and receiving IMs/being placed in restraints. Patient is visible on the milieu. Patient has demonstrated poor boundaries with his peers. Patient is able to make needs known to staff. Psych rehab will continue to work with patient daily to provide psychoeducation and needed support.  
  02-07-22  Type of therapy: Dialectical behavior therapy,Creative arts therapy,Leisure development,Mindfulness,Peer advocate  Type of session: Individual  Level of patient participation: Participated with encouragement  Duration of participation: 15 minutes  Therapy conducted by: Psych rehab  Therapy Summary: Writer met with patient to assess patients progress towards psychiatric rehabilitation goal over the past seven days. Patient was polite upon approach and demonstrated disorganized thinking. Over the past seven days, patient has been working on the goal of taking all medications as prescribed. Patient has not demonstrated medication compliance and has been observed to only take PRN injections. Patient reports that medications “make me act erratic”. Patient then asked writer for writer to get patient whiskey or wine because “it is medicine.” Writer attempted to provide patient education regarding the benefits of his prescribed medications and the difference between these medications versus alcohol. Patient was unreceptive and continued to tell writer alcohol is medicine and that he should be given it while in the hospital. Patient would benefit from continuing to work on his goal for the next seven days. Patient has attended approximately 42 percent of psychiatric rehabilitation groups over the past seven days. Patient demonstrates difficulty tolerating group structure which is evident by patient coming and going during the session and interrupting other patients during discussions. Patient is visible on the milieu. Patient has demonstrated poor boundaries with his peers and has been observed to take the belongings of other patients. Patient is able to make needs known to staff. Psych rehab will continue to work with patient daily to provide psychoeducation and needed support.  
  01-30-22  Type of therapy: Psychoeducation  --  --  --  --  --    02-01-22  Type of therapy: Coping skills,Creative arts therapy,Music therapy  Type of session: Individual  Level of patient participation: Participated with encouragement  Duration of participation: Less than 15 minutes  Therapy conducted by: Psych rehab  Therapy Summary: Writer met with patient to assess progress towards psychiatric rehabilitation goals during the past seven days. Pt was observed sitting in the dayroom eating his breakfast. During interview, pt was observed intermittently closing his eyes and drooling. Pt is observed intermittently visible on the unit. As per staff, pt has been observed frequently seeking out staff and was increasingly difficult to redirect.     Pt has been working on taking all medications as prescribed. Pt has made some progress. Patient has reported demonstrated daily medication compliance. However as per chart, pt has been intermittently medication compliant. Pt denied SI/HI. When assessing for AH/VH, pt endorsed seeing "visions" but denied seeing anything currently. Psych rehab staff will continue to meet with patient within the next 7 days for encouragement, support, and psychotherapy.

## 2022-02-18 NOTE — BH TREATMENT PLAN - NSTXDCSOCGOAL_PSY_ALL_CORE
Will accept referrals to support groups, clubhouse, senior centers, etc.

## 2022-02-18 NOTE — BH INPATIENT PSYCHIATRY PROGRESS NOTE - NSBHCHARTREVIEWVS_PSY_A_CORE FT
Vital Signs Last 24 Hrs  T(C): 36.4 (02-18-22 @ 07:38), Max: 36.4 (02-18-22 @ 07:38)  T(F): 97.6 (02-18-22 @ 07:38), Max: 97.6 (02-18-22 @ 07:38)  HR: 97 (02-17-22 @ 13:46) (97 - 97)  BP: 110/61 (02-17-22 @ 13:46) (110/61 - 110/61)  BP(mean): --  RR: 16 (02-17-22 @ 20:19) (16 - 16)  SpO2: --    Orthostatic VS  02-18-22 @ 07:38  Lying BP: --/-- HR: --  Sitting BP: 132/85 HR: 105  Standing BP: --/-- HR: --  Site: --  Mode: --  Orthostatic VS  02-17-22 @ 20:19  Lying BP: --/-- HR: --  Sitting BP: 114/68 HR: 98  Standing BP: 106/62 HR: 100  Site: --  Mode: --  Orthostatic VS  02-17-22 @ 07:50  Lying BP: --/-- HR: --  Sitting BP: 104/77 HR: 113  Standing BP: --/-- HR: --  Site: --  Mode: --  Orthostatic VS  02-16-22 @ 20:25  Lying BP: --/-- HR: --  Sitting BP: 115/68 HR: 98  Standing BP: 118/71 HR: 101  Site: --  Mode: --

## 2022-02-18 NOTE — BH INPATIENT PSYCHIATRY PROGRESS NOTE - MSE UNSTRUCTURED FT
The patient appears stated age, with fair hygiene, and is dressed appropriately.  He was superficially calm.  He maintained appropriate eye contact.  No restlessness or slowing of movements observed.  Gait is steady.  The patient’s speech was fluent, but mumbled at times, normal in tone, rate and volume.  The patient’s mood is “ok"  His affect is constricted, incongruent with mood.  The patient’s thoughts perseverative, slight improvement in organization.  He has delusional thinking, denies AV / VH today.  He does not have any suicidal or homicidal ideation, intent, or plan.  Insight is poor.  Judgment is limited.  Impulse control fair over this interval

## 2022-02-18 NOTE — BH TREATMENT PLAN - NSTXDCSOCINTERRN_PSY_ALL_CORE
Nurse will encourage the pt to utilize support systems available in the community.
Healthcare team will collaborate with the patient in order to ensure safe discharge

## 2022-02-18 NOTE — BH TREATMENT PLAN - NSTXDCSOCINTERSW_PSY_ALL_CORE
SW provided encouragement to pt to stay engaged with supports in place, pt agreed to work on becoming better motivated
SW provided encouragement to pt to stay engaged with supports in place, pt agreed to work on becoming better motivated
GUS meets with pt regularly to encourage engaging supports in the community, pt continues to struggle, remains illogical and symptomatic stating he doesn't need any help
SW continued to provide pt with encouragement to improve engagement with supports in place in the community, pt still struggles with concept of verbalizing needs

## 2022-02-18 NOTE — BH TREATMENT PLAN - NSTXPATIENTPARTICIPATE_PSY_ALL_CORE
Patient participated in defining interventions
Patient participated in identification of needs/problems/goals for treatment/Patient participated in defining interventions
No, patient unwilling to participate

## 2022-02-18 NOTE — BH TREATMENT PLAN - NSTXDISORGGOAL_PSY_ALL_CORE
Will identify 2 coping skills that assist in organizing
Will make at least 3 goal and reality oriented statements during therapy
Will identify 2 coping skills that assist in organizing

## 2022-02-18 NOTE — BH TREATMENT PLAN - NSTXMEDICINTERPR_PSY_ALL_CORE
Psychiatric Rehabilitation staff will continuously engage patient to build on a therapeutic rapport and to assist in achieving the above stated goal.
Patient has not demonstrated progress towards this goal. Psych rehab recommends patient increases compliance to treatment in order to assist patient in symptom reduction.
Psych rehab staff will continue to support and provide patient with psycho-education in individual and group sessions in effort to facilitate patient progress towards goal.
Patient has not demonstrated progress towards this goal. Psych rehab recommends patient engages with treatment team in order for patient to gain psychoeducation and support over the next seven days.

## 2022-02-18 NOTE — BH TREATMENT PLAN - NSDCCRITERIA_PSY_ALL_CORE
CGI < / = 3 
CGI< / = 3 When pt is no longer an acute or imminent risk of harm to self or others, and is able to care for self safely, pt may then be discharged. 

## 2022-02-18 NOTE — BH TREATMENT PLAN - NSTXMEDICINTERRN_PSY_ALL_CORE
Nurse will educate the pt on the benefits of remaining tx and medication compliant.
Encourage patient to follow medication regimen as ordered and to ask questions regarding medication regimen
Nurse will educate the pt on the benefits associated with medication compliance.
Nurse will educate the pt on the benefits associated with medication compliance.

## 2022-02-18 NOTE — BH TREATMENT PLAN - NSTXDISORGINTERRN_PSY_ALL_CORE
Nurse will educate the pt on 3 positive coping mechanisms.
Encourage patient to verbalize all feelings of distres and staff will provide reality orientation as needed
Nurse will assist the pt in identifying 2 positive coping mechanisms.
Nurse will assist the pt in identifying 2 positive coping mechanisms.

## 2022-02-18 NOTE — BH TREATMENT PLAN - NSTXPROBDCSOC_PSY_ALL_CORE
DISCHARGE ISSUE - POOR SOCIALIZATION IN COMMUNITY

## 2022-02-19 RX ORDER — HALOPERIDOL DECANOATE 100 MG/ML
5 INJECTION INTRAMUSCULAR ONCE
Refills: 0 | Status: COMPLETED | OUTPATIENT
Start: 2022-02-19 | End: 2022-02-19

## 2022-02-19 RX ADMIN — Medication 0.1 MILLIGRAM(S): at 10:01

## 2022-02-19 RX ADMIN — HALOPERIDOL DECANOATE 5 MILLIGRAM(S): 100 INJECTION INTRAMUSCULAR at 00:16

## 2022-02-19 RX ADMIN — RISPERIDONE 1 MILLIGRAM(S): 4 TABLET ORAL at 10:01

## 2022-02-19 RX ADMIN — HALOPERIDOL DECANOATE 5 MILLIGRAM(S): 100 INJECTION INTRAMUSCULAR at 21:31

## 2022-02-20 LAB — SARS-COV-2 RNA SPEC QL NAA+PROBE: SIGNIFICANT CHANGE UP

## 2022-02-20 RX ORDER — HALOPERIDOL DECANOATE 100 MG/ML
5 INJECTION INTRAMUSCULAR ONCE
Refills: 0 | Status: COMPLETED | OUTPATIENT
Start: 2022-02-20 | End: 2022-02-20

## 2022-02-20 RX ADMIN — RISPERIDONE 1 MILLIGRAM(S): 4 TABLET ORAL at 09:14

## 2022-02-20 RX ADMIN — Medication 0.1 MILLIGRAM(S): at 10:12

## 2022-02-20 RX ADMIN — HALOPERIDOL DECANOATE 5 MILLIGRAM(S): 100 INJECTION INTRAMUSCULAR at 22:35

## 2022-02-20 RX ADMIN — Medication 0.1 MILLIGRAM(S): at 13:45

## 2022-02-21 RX ORDER — HALOPERIDOL DECANOATE 100 MG/ML
5 INJECTION INTRAMUSCULAR ONCE
Refills: 0 | Status: COMPLETED | OUTPATIENT
Start: 2022-02-21 | End: 2022-02-21

## 2022-02-21 RX ORDER — DIPHENHYDRAMINE HCL 50 MG
50 CAPSULE ORAL ONCE
Refills: 0 | Status: COMPLETED | OUTPATIENT
Start: 2022-02-21 | End: 2022-02-21

## 2022-02-21 RX ADMIN — Medication 0.1 MILLIGRAM(S): at 13:43

## 2022-02-21 RX ADMIN — Medication 50 MILLIGRAM(S): at 22:14

## 2022-02-21 RX ADMIN — Medication 2 MILLIGRAM(S): at 22:15

## 2022-02-21 RX ADMIN — HALOPERIDOL DECANOATE 5 MILLIGRAM(S): 100 INJECTION INTRAMUSCULAR at 09:28

## 2022-02-21 RX ADMIN — HALOPERIDOL DECANOATE 5 MILLIGRAM(S): 100 INJECTION INTRAMUSCULAR at 21:37

## 2022-02-22 ENCOUNTER — INPATIENT (INPATIENT)
Facility: HOSPITAL | Age: 22
LOS: 2 days | Discharge: PSYCHIATRIC OR SUBSTANCE USE FACILITY | End: 2022-02-25
Attending: HOSPITALIST | Admitting: HOSPITALIST
Payer: COMMERCIAL

## 2022-02-22 VITALS — TEMPERATURE: 98 F

## 2022-02-22 VITALS
RESPIRATION RATE: 16 BRPM | OXYGEN SATURATION: 99 % | HEART RATE: 108 BPM | DIASTOLIC BLOOD PRESSURE: 66 MMHG | SYSTOLIC BLOOD PRESSURE: 98 MMHG | TEMPERATURE: 97 F

## 2022-02-22 DIAGNOSIS — S92.902A UNSPECIFIED FRACTURE OF LEFT FOOT, INITIAL ENCOUNTER FOR CLOSED FRACTURE: ICD-10-CM

## 2022-02-22 DIAGNOSIS — S92.422B DISPLACED FRACTURE OF DISTAL PHALANX OF LEFT GREAT TOE, INITIAL ENCOUNTER FOR OPEN FRACTURE: ICD-10-CM

## 2022-02-22 DIAGNOSIS — F25.9 SCHIZOAFFECTIVE DISORDER, UNSPECIFIED: ICD-10-CM

## 2022-02-22 DIAGNOSIS — Z29.9 ENCOUNTER FOR PROPHYLACTIC MEASURES, UNSPECIFIED: ICD-10-CM

## 2022-02-22 PROBLEM — F31.9 BIPOLAR DISORDER, UNSPECIFIED: Chronic | Status: ACTIVE | Noted: 2022-01-25

## 2022-02-22 LAB
ALBUMIN SERPL ELPH-MCNC: 3.9 G/DL — SIGNIFICANT CHANGE UP (ref 3.3–5)
ALP SERPL-CCNC: 59 U/L — SIGNIFICANT CHANGE UP (ref 40–120)
ALT FLD-CCNC: 10 U/L — SIGNIFICANT CHANGE UP (ref 4–41)
ANION GAP SERPL CALC-SCNC: 10 MMOL/L — SIGNIFICANT CHANGE UP (ref 7–14)
APTT BLD: 37.2 SEC — HIGH (ref 27–36.3)
AST SERPL-CCNC: 19 U/L — SIGNIFICANT CHANGE UP (ref 4–40)
BASOPHILS # BLD AUTO: 0.02 K/UL — SIGNIFICANT CHANGE UP (ref 0–0.2)
BASOPHILS NFR BLD AUTO: 0.3 % — SIGNIFICANT CHANGE UP (ref 0–2)
BILIRUB SERPL-MCNC: 0.5 MG/DL — SIGNIFICANT CHANGE UP (ref 0.2–1.2)
BLD GP AB SCN SERPL QL: NEGATIVE — SIGNIFICANT CHANGE UP
BUN SERPL-MCNC: 11 MG/DL — SIGNIFICANT CHANGE UP (ref 7–23)
CALCIUM SERPL-MCNC: 9.1 MG/DL — SIGNIFICANT CHANGE UP (ref 8.4–10.5)
CHLORIDE SERPL-SCNC: 103 MMOL/L — SIGNIFICANT CHANGE UP (ref 98–107)
CO2 SERPL-SCNC: 25 MMOL/L — SIGNIFICANT CHANGE UP (ref 22–31)
CREAT SERPL-MCNC: 0.97 MG/DL — SIGNIFICANT CHANGE UP (ref 0.5–1.3)
EOSINOPHIL # BLD AUTO: 0.15 K/UL — SIGNIFICANT CHANGE UP (ref 0–0.5)
EOSINOPHIL NFR BLD AUTO: 2.3 % — SIGNIFICANT CHANGE UP (ref 0–6)
GLUCOSE SERPL-MCNC: 85 MG/DL — SIGNIFICANT CHANGE UP (ref 70–99)
HCT VFR BLD CALC: 40.7 % — SIGNIFICANT CHANGE UP (ref 39–50)
HGB BLD-MCNC: 13.8 G/DL — SIGNIFICANT CHANGE UP (ref 13–17)
IANC: 3.38 K/UL — SIGNIFICANT CHANGE UP (ref 1.5–8.5)
IMM GRANULOCYTES NFR BLD AUTO: 0.2 % — SIGNIFICANT CHANGE UP (ref 0–1.5)
INR BLD: 1.19 RATIO — HIGH (ref 0.88–1.16)
LYMPHOCYTES # BLD AUTO: 1.99 K/UL — SIGNIFICANT CHANGE UP (ref 1–3.3)
LYMPHOCYTES # BLD AUTO: 30.7 % — SIGNIFICANT CHANGE UP (ref 13–44)
MCHC RBC-ENTMCNC: 29.7 PG — SIGNIFICANT CHANGE UP (ref 27–34)
MCHC RBC-ENTMCNC: 33.9 GM/DL — SIGNIFICANT CHANGE UP (ref 32–36)
MCV RBC AUTO: 87.7 FL — SIGNIFICANT CHANGE UP (ref 80–100)
MONOCYTES # BLD AUTO: 0.93 K/UL — HIGH (ref 0–0.9)
MONOCYTES NFR BLD AUTO: 14.4 % — HIGH (ref 2–14)
NEUTROPHILS # BLD AUTO: 3.38 K/UL — SIGNIFICANT CHANGE UP (ref 1.8–7.4)
NEUTROPHILS NFR BLD AUTO: 52.1 % — SIGNIFICANT CHANGE UP (ref 43–77)
NRBC # BLD: 0 /100 WBCS — SIGNIFICANT CHANGE UP
NRBC # FLD: 0 K/UL — SIGNIFICANT CHANGE UP
PLATELET # BLD AUTO: 234 K/UL — SIGNIFICANT CHANGE UP (ref 150–400)
POTASSIUM SERPL-MCNC: 4.4 MMOL/L — SIGNIFICANT CHANGE UP (ref 3.5–5.3)
POTASSIUM SERPL-SCNC: 4.4 MMOL/L — SIGNIFICANT CHANGE UP (ref 3.5–5.3)
PROT SERPL-MCNC: 6.8 G/DL — SIGNIFICANT CHANGE UP (ref 6–8.3)
PROTHROM AB SERPL-ACNC: 13.6 SEC — SIGNIFICANT CHANGE UP (ref 10.6–13.6)
RBC # BLD: 4.64 M/UL — SIGNIFICANT CHANGE UP (ref 4.2–5.8)
RBC # FLD: 12.6 % — SIGNIFICANT CHANGE UP (ref 10.3–14.5)
RH IG SCN BLD-IMP: POSITIVE — SIGNIFICANT CHANGE UP
SODIUM SERPL-SCNC: 138 MMOL/L — SIGNIFICANT CHANGE UP (ref 135–145)
WBC # BLD: 6.48 K/UL — SIGNIFICANT CHANGE UP (ref 3.8–10.5)
WBC # FLD AUTO: 6.48 K/UL — SIGNIFICANT CHANGE UP (ref 3.8–10.5)

## 2022-02-22 PROCEDURE — 99223 1ST HOSP IP/OBS HIGH 75: CPT | Mod: GC

## 2022-02-22 PROCEDURE — 99238 HOSP IP/OBS DSCHRG MGMT 30/<: CPT

## 2022-02-22 PROCEDURE — 99285 EMERGENCY DEPT VISIT HI MDM: CPT

## 2022-02-22 PROCEDURE — 73660 X-RAY EXAM OF TOE(S): CPT | Mod: 26,LT

## 2022-02-22 RX ORDER — ACETAMINOPHEN 500 MG
975 TABLET ORAL ONCE
Refills: 0 | Status: COMPLETED | OUTPATIENT
Start: 2022-02-22 | End: 2022-02-22

## 2022-02-22 RX ORDER — DIPHENHYDRAMINE HCL 50 MG
50 CAPSULE ORAL
Qty: 0 | Refills: 0 | DISCHARGE

## 2022-02-22 RX ORDER — NICOTINE POLACRILEX 2 MG
1 GUM BUCCAL
Qty: 0 | Refills: 0 | DISCHARGE

## 2022-02-22 RX ORDER — HALOPERIDOL DECANOATE 100 MG/ML
5 INJECTION INTRAMUSCULAR
Qty: 0 | Refills: 0 | DISCHARGE

## 2022-02-22 RX ORDER — DIVALPROEX SODIUM 500 MG/1
250 TABLET, DELAYED RELEASE ORAL AT BEDTIME
Refills: 0 | Status: DISCONTINUED | OUTPATIENT
Start: 2022-02-22 | End: 2022-02-22

## 2022-02-22 RX ORDER — HALOPERIDOL DECANOATE 100 MG/ML
1 INJECTION INTRAMUSCULAR
Qty: 0 | Refills: 0 | DISCHARGE
Start: 2022-02-22

## 2022-02-22 RX ORDER — RISPERIDONE 4 MG/1
1 TABLET ORAL
Qty: 0 | Refills: 0 | DISCHARGE
Start: 2022-02-22

## 2022-02-22 RX ORDER — SODIUM CHLORIDE 9 MG/ML
1000 INJECTION INTRAMUSCULAR; INTRAVENOUS; SUBCUTANEOUS ONCE
Refills: 0 | Status: COMPLETED | OUTPATIENT
Start: 2022-02-22 | End: 2022-02-22

## 2022-02-22 RX ORDER — RISPERIDONE 4 MG/1
1 TABLET ORAL
Qty: 0 | Refills: 0 | DISCHARGE

## 2022-02-22 RX ORDER — RISPERIDONE 4 MG/1
1 TABLET ORAL
Refills: 0 | Status: DISCONTINUED | OUTPATIENT
Start: 2022-02-22 | End: 2022-02-25

## 2022-02-22 RX ORDER — DIVALPROEX SODIUM 500 MG/1
2 TABLET, DELAYED RELEASE ORAL
Qty: 0 | Refills: 0 | DISCHARGE

## 2022-02-22 RX ORDER — HALOPERIDOL DECANOATE 100 MG/ML
5 INJECTION INTRAMUSCULAR EVERY 4 HOURS
Refills: 0 | Status: DISCONTINUED | OUTPATIENT
Start: 2022-02-22 | End: 2022-02-23

## 2022-02-22 RX ORDER — DIVALPROEX SODIUM 500 MG/1
1 TABLET, DELAYED RELEASE ORAL
Qty: 0 | Refills: 0 | DISCHARGE

## 2022-02-22 RX ORDER — CEFAZOLIN SODIUM 1 G
1000 VIAL (EA) INJECTION EVERY 12 HOURS
Refills: 0 | Status: DISCONTINUED | OUTPATIENT
Start: 2022-02-22 | End: 2022-02-24

## 2022-02-22 RX ORDER — CEFAZOLIN SODIUM 1 G
500 VIAL (EA) INJECTION EVERY 12 HOURS
Refills: 0 | Status: DISCONTINUED | OUTPATIENT
Start: 2022-02-22 | End: 2022-02-22

## 2022-02-22 RX ORDER — CEFAZOLIN SODIUM 1 G
1000 VIAL (EA) INJECTION ONCE
Refills: 0 | Status: COMPLETED | OUTPATIENT
Start: 2022-02-22 | End: 2022-02-22

## 2022-02-22 RX ORDER — DIVALPROEX SODIUM 500 MG/1
1250 TABLET, DELAYED RELEASE ORAL AT BEDTIME
Refills: 0 | Status: DISCONTINUED | OUTPATIENT
Start: 2022-02-22 | End: 2022-02-25

## 2022-02-22 RX ORDER — DIVALPROEX SODIUM 500 MG/1
5 TABLET, DELAYED RELEASE ORAL
Qty: 0 | Refills: 0 | DISCHARGE
Start: 2022-02-22

## 2022-02-22 RX ORDER — PROCHLORPERAZINE MALEATE 5 MG
10 TABLET ORAL
Qty: 0 | Refills: 0 | DISCHARGE

## 2022-02-22 RX ORDER — DIPHENHYDRAMINE HCL 50 MG
1 CAPSULE ORAL
Qty: 0 | Refills: 0 | DISCHARGE

## 2022-02-22 RX ORDER — HALOPERIDOL DECANOATE 100 MG/ML
1 INJECTION INTRAMUSCULAR
Qty: 0 | Refills: 0 | DISCHARGE

## 2022-02-22 RX ORDER — PROCHLORPERAZINE MALEATE 5 MG
1 TABLET ORAL
Qty: 0 | Refills: 0 | DISCHARGE

## 2022-02-22 RX ADMIN — DIVALPROEX SODIUM 1250 MILLIGRAM(S): 500 TABLET, DELAYED RELEASE ORAL at 22:06

## 2022-02-22 RX ADMIN — RISPERIDONE 1 MILLIGRAM(S): 4 TABLET ORAL at 10:07

## 2022-02-22 RX ADMIN — SODIUM CHLORIDE 1000 MILLILITER(S): 9 INJECTION INTRAMUSCULAR; INTRAVENOUS; SUBCUTANEOUS at 15:55

## 2022-02-22 RX ADMIN — Medication 975 MILLIGRAM(S): at 13:41

## 2022-02-22 RX ADMIN — RISPERIDONE 1 MILLIGRAM(S): 4 TABLET ORAL at 18:36

## 2022-02-22 RX ADMIN — Medication 0.1 MILLIGRAM(S): at 10:07

## 2022-02-22 RX ADMIN — Medication 100 MILLIGRAM(S): at 14:28

## 2022-02-22 RX ADMIN — Medication 1 MILLIGRAM(S): at 21:24

## 2022-02-22 NOTE — BH CONSULTATION LIAISON ASSESSMENT NOTE - CURRENT MEDICATION
MEDICATIONS  (STANDING):  ceFAZolin   IVPB 1000 milliGRAM(s) IV Intermittent every 12 hours  cloNIDine 0.1 milliGRAM(s) Oral three times a day  diVALproex ER 1250 milliGRAM(s) Oral at bedtime  LORazepam     Tablet 1 milliGRAM(s) Oral at bedtime  risperiDONE   Tablet 1 milliGRAM(s) Oral two times a day    MEDICATIONS  (PRN):  aluminum hydroxide/magnesium hydroxide/simethicone Suspension 30 milliLiter(s) Oral every 6 hours PRN Dyspepsia  haloperidol     Tablet 5 milliGRAM(s) Oral every 4 hours PRN agitation

## 2022-02-22 NOTE — BH INPATIENT PSYCHIATRY DISCHARGE NOTE - OTHER PAST PSYCHIATRIC HISTORY (INCLUDE DETAILS REGARDING ONSET, COURSE OF ILLNESS, INPATIENT/OUTPATIENT TREATMENT)
Per EMR pt has a hx of schizophrenia vs bipolar, asperger's syndrome, has had multiple inpt admissions , last @ a hospital in [place] in 8/21, is currently in tx with PACT team in [place], medication compliancy is not historically consistent. Per EMR pt has no hx of  SA/SI, has had delusional behavior/ AH, endorses hearing voices when decompensated, has no hx of substance abuse, no aggression, no violent uncontrollable behaviors, has a hx impulsivity and unpredictable behavior, has no legal issues , no medical hx

## 2022-02-22 NOTE — BH INPATIENT PSYCHIATRY PROGRESS NOTE - NSTXMEDICPROGRES_PSY_ALL_CORE
Improving
Improving
No Change
Improving
No Change
Improving
No Change
Improving
Improving

## 2022-02-22 NOTE — H&P ADULT - ATTENDING COMMENTS
21 yo M with schizoaffective d/o and autism that was transferred from [facility] for open fracture of left hallux. s/p reduction and wound dressed and cleaned by Podiatry;  recommendations: Ancef 1g BID x7 days ----> if able to d/c then can complete one week of antibiotics with Keflex.  Weight bearing as tolerated to  left foot heel in surgical shoe.  Patient is to follow up with Dr. Waterhouse within 1 week of discharge at [facility] [phone]    Currently social issue that patient must have a surgical shoe, can not have while at [facility]. Psych called to follow during admission. Maintain 1:1 pending psych eval.

## 2022-02-22 NOTE — BH INPATIENT PSYCHIATRY PROGRESS NOTE - NSTXDCSOCDATETRGT_PSY_ALL_CORE
04-Feb-2022
17-Feb-2022
24-Feb-2022
24-Feb-2022
17-Feb-2022
28-Feb-2022
04-Feb-2022
17-Feb-2022
04-Feb-2022
17-Feb-2022
24-Feb-2022
04-Feb-2022
17-Feb-2022
17-Feb-2022
28-Feb-2022

## 2022-02-22 NOTE — H&P ADULT - NSHPSOCIALHISTORY_GEN_ALL_CORE
Admitted to [facility] for >1 month  States he smokes an occasional cigarette but none since [facility] admission  occasional etoh, last drink ~ 1 month  no recreational drug use

## 2022-02-22 NOTE — ED ADULT TRIAGE NOTE - CHIEF COMPLAINT QUOTE
Pt arrives from [facility] after falling during a "behavioral episode" as per paperwork from inpatient unit. Pt now endorsing pain to left foot. PMH: schizoaffective disorder, autism. Pt poor medical historian, accompanied by [facility] staff. Pt calm/cooperative in triage. Pt arrives from [facility] after falling during a "behavioral episode" as per paperwork from inpatient unit. Pt now endorsing pain to left foot. PMH: schizoaffective disorder, autism. Pt poor medical historian, accompanied by [facility] staff. Pt calm/cooperative in triage.  NP contacted, instructed for pt to be evaluated in intake.

## 2022-02-22 NOTE — BH INPATIENT PSYCHIATRY DISCHARGE NOTE - DESCRIPTION
Lives with Father in [place], works PT at [employer], parents are , wants to get his GED. some siblings live in [place]

## 2022-02-22 NOTE — BH INPATIENT PSYCHIATRY PROGRESS NOTE - NSTXDCOPNODATEEST_PSY_ALL_CORE
27-Jan-2022
27-Jan-2022
16-Feb-2022
27-Jan-2022
16-Feb-2022
27-Jan-2022
16-Feb-2022
27-Jan-2022
27-Jan-2022

## 2022-02-22 NOTE — H&P ADULT - HISTORY OF PRESENT ILLNESS
23 yo M with schizoaffective d/o and autism that was transferred from [facility] for open fracture of left hallux. Patient states that he was jumping over an obstacle to prove he could do it, as he does on a daily basis, when he tripped and hurt his foot. This occurred on 2/21 in the evening hours. He states normally he lands this jump and as such has never broken his foot before. Overnight his foot as started to hurt more and into the morning he noticed that it was more swollen and he could not walk on it. He also noticed that his bone may be sticking out. He denies fever, chills, nausea, vomiting, diarrhea, constipation, chest pain, sob, orthopnea, pnd, dysuria, hematuria, visual change, focal weakness, SI or HI, hallucinations.

## 2022-02-22 NOTE — H&P ADULT - ASSESSMENT
23 yo M with schizoaffective d/o and autism that was transferred from [facility] for open fracture of left hallux.

## 2022-02-22 NOTE — BH INPATIENT PSYCHIATRY PROGRESS NOTE - MSE UNSTRUCTURED FT
The patient appears stated age, with fair hygiene, and is dressed appropriately.  He was superficially calm.  He maintained appropriate eye contact.  No restlessness or slowing of movements observed.  Gait is steady.  The patient’s speech was fluent, but mumbled at times, normal in tone, rate and volume.  The patient’s mood is “fine"  His affect is constricted, incongruent with mood.  The patient’s thoughts perseverative, slight improvement in organization.  He has delusional thinking, denies AV / VH today.  He does not have any suicidal or homicidal ideation, intent, or plan.  Insight is poor.  Judgment is limited.  Impulse control poor over this interval

## 2022-02-22 NOTE — BH INPATIENT PSYCHIATRY PROGRESS NOTE - NSTXPROBDCSOC_PSY_ALL_CORE
DISCHARGE ISSUE - POOR SOCIALIZATION IN COMMUNITY

## 2022-02-22 NOTE — BH INPATIENT PSYCHIATRY PROGRESS NOTE - NSBHCONSBHPROVCNTCTNOFT_PSY_A_CORE
will contact once consent obtained 

## 2022-02-22 NOTE — BH INPATIENT PSYCHIATRY PROGRESS NOTE - NSTXDCSOCPROGRES_PSY_ALL_CORE
No Change

## 2022-02-22 NOTE — BH CONSULTATION LIAISON ASSESSMENT NOTE - OTHER PAST PSYCHIATRIC HISTORY (INCLUDE DETAILS REGARDING ONSET, COURSE OF ILLNESS, INPATIENT/OUTPATIENT TREATMENT)
30-40 psych hospitalizations including a state hospitalization for over 10 months starting at the age of 13 years old. Last hospitalization was in [facility] in 08/2021 prior to this [facility] admission in 1/2022. As per psyckes he's diagnosed with schizophrenia but as per mother he's diagnosed with Bipolar I Disorder.   Currently followed by a PACT team in [place] - ([place] a PACT contact, phone number is [phone])

## 2022-02-22 NOTE — BH CONSULTATION LIAISON ASSESSMENT NOTE - VIOLENCE RISK FACTORS:
Affective dysregulation/Impulsivity/Lack of insight into violence risk/need for treatment/Noncompliance with treatment

## 2022-02-22 NOTE — H&P ADULT - NSHPPHYSICALEXAM_GEN_ALL_CORE
PHYSICAL EXAM:  GENERAL: NAD, well-developed  HEAD:  Atraumatic, Normocephalic  EYES: EOMI, PERRLA, conjunctiva and sclera clear  NECK: Supple, No JVD  CHEST/LUNG: Clear to auscultation bilaterally; No wheeze/rhonchi/rale  HEART: Regular rate and rhythm; No murmurs, rubs, or gallops  ABDOMEN: Soft, Nontender, Nondistended; Bowel sounds present  EXTREMITIES: left foot in surgical boot and wrapped s/p reduction in left,  2+ Peripheral Pulses, No clubbing, cyanosis, or edema  PSYCH: AAOx3  NEUROLOGY: non-focal  SKIN: No rashes or lesions

## 2022-02-22 NOTE — BH CONSULTATION LIAISON ASSESSMENT NOTE - NSBHCHARTREVIEWVS_PSY_A_CORE FT
Vital Signs Last 24 Hrs  T(C): 36.3 (22 Feb 2022 17:51), Max: 36.4 (22 Feb 2022 08:01)  T(F): 97.4 (22 Feb 2022 17:51), Max: 97.6 (22 Feb 2022 16:25)  HR: 89 (22 Feb 2022 17:51) (89 - 108)  BP: 104/70 (22 Feb 2022 17:51) (98/66 - 121/100)  BP(mean): --  RR: 18 (22 Feb 2022 17:51) (16 - 18)  SpO2: 100% (22 Feb 2022 17:51) (99% - 100%)

## 2022-02-22 NOTE — BH INPATIENT PSYCHIATRY PROGRESS NOTE - NSTXDCSOCGOAL_PSY_ALL_CORE
Will accept referrals to support groups, clubhouse, senior centers, etc.

## 2022-02-22 NOTE — ED PROVIDER NOTE - OBJECTIVE STATEMENT
23 yo M with 23 yo M with schizoaffective d/o that was transferred from [facility] for left foot wound. Per pt, he was at [facility] last night, fell and had left toe pain. Per note from [facility] and staff, pt tried to jump over nursing station last night, sustained a laceration and was sent for eval. Pt states he has pain at great toe of left foot. No other injuries reported. No head trauma, no LOC.

## 2022-02-22 NOTE — ED ADULT NURSE NOTE - NS ED NOTE ABUSE SUSPICION NEGLECT YN
Right LE Active ROM was WFL (within functional limits)/bilateral upper extremity Active ROM was WFL (within functional limits)/Left ankle-WFL No

## 2022-02-22 NOTE — BH INPATIENT PSYCHIATRY PROGRESS NOTE - NSTXDCSOCINTERMD_PSY_ALL_CORE
coordinate with s/w

## 2022-02-22 NOTE — PROVIDER CONTACT NOTE (OTHER) - ACTION/TREATMENT ORDERED:
As per provider Jefferson Don, recheck vitals in an hour. No interventions needed at this time. RN will continue to monitor.

## 2022-02-22 NOTE — BH INPATIENT PSYCHIATRY PROGRESS NOTE - NSTXMEDICDATETRGT_PSY_ALL_CORE
03-Feb-2022
21-Feb-2022
21-Feb-2022
02-Feb-2022
03-Feb-2022
03-Feb-2022
08-Feb-2022
08-Feb-2022
17-Feb-2022
24-Feb-2022
08-Feb-2022
08-Feb-2022
24-Feb-2022
14-Feb-2022
03-Feb-2022
17-Feb-2022
21-Feb-2022
28-Feb-2022
14-Feb-2022
14-Feb-2022

## 2022-02-22 NOTE — CONSULT NOTE ADULT - ASSESSMENT
22M with L foot open fracture of hallux   - Pt assessed in ED with pt's PCA bedside   - Xrays pending   - Exam: Musculoskeletal/Ortho: L foot hallux with proximal phalanx dislocation and break in skin. Intact ROM to the L foot hallux in plantarflexion and dorsiflexion. Lesser digits to the L foot with intact ROM and MMT. Skin: L foot hallux with exposed proximal phalanx head with 3cm skin defect, no active bleeding no periwound erythema, no tendon exposed, no acute signs of infection. R foot with no open wounds or clinical signs of infection    - L foot hallux blocked using 10cc of 2% lidocaine plain  - L foot hallux wound flushed with copious amounts of NS   - Then L foot hallux was reduced using Fela maneuver, post reduction patient was still able to move digit in plantarflexion and dorsiflexion, NVS still intact post reduction  - After prepping the foot in a sterile manner, L foot hallux wound was flushed with copious amounts of NS, then using a sterile suture removal, skin was closed using 4-0 nylon in simple interrupted fashion and dressed with bacitracin, betadine soaked 4x4 gauze, dry 4x4 gauze, quang and ACE  - Pt tolerated procedure well   - Surgical shoe dispense to L foot  - Pt is to be weight bearing to L foot heel in surgical shoe   - Recommend IV ancef   - Please leave dressings clean dry and intact  - Pt is stable to d/c from podiatry standpoint   - Patient is to follow up with Dr. Waterhouse within 1 week of discharge at [facility] [phone]  - Discussed with attending

## 2022-02-22 NOTE — ED ADULT NURSE NOTE - CHIEF COMPLAINT QUOTE
Pt arrives from [facility] after falling during a "behavioral episode" as per paperwork from inpatient unit. Pt now endorsing pain to left foot. PMH: schizoaffective disorder, autism. Pt poor medical historian, accompanied by [facility] staff. Pt calm/cooperative in triage.  NP contacted, instructed for pt to be evaluated in intake.

## 2022-02-22 NOTE — BH INPATIENT PSYCHIATRY PROGRESS NOTE - PRN MEDS
MEDICATIONS  (PRN):  aluminum hydroxide/magnesium hydroxide/simethicone Suspension 30 milliLiter(s) Oral every 6 hours PRN Dyspepsia  diphenhydrAMINE 50 milliGRAM(s) Oral every 6 hours PRN agitation  diphenhydrAMINE Injectable 50 milliGRAM(s) IntraMuscular once PRN agitation  haloperidol     Tablet 5 milliGRAM(s) Oral every 6 hours PRN agitation  haloperidol    Injectable 5 milliGRAM(s) IntraMuscular once PRN severe agitation  LORazepam     Tablet 2 milliGRAM(s) Oral every 6 hours PRN Agitation  LORazepam   Injectable 2 milliGRAM(s) IntraMuscular once PRN agitation  nicotine  Polacrilex Gum 2 milliGRAM(s) Oral every 4 hours PRN nrt  prochlorperazine   Injectable 10 milliGRAM(s) IntraMuscular once PRN nausea  prochlorperazine   Tablet 10 milliGRAM(s) Oral every 8 hours PRN nausea  
MEDICATIONS  (PRN):  aluminum hydroxide/magnesium hydroxide/simethicone Suspension 30 milliLiter(s) Oral every 6 hours PRN Dyspepsia  diphenhydrAMINE 50 milliGRAM(s) Oral every 6 hours PRN agitation  diphenhydrAMINE Injectable 50 milliGRAM(s) IntraMuscular once PRN agitation  haloperidol     Tablet 5 milliGRAM(s) Oral every 4 hours PRN agitation  haloperidol    Injectable 5 milliGRAM(s) IntraMuscular once PRN agitation / patient refusal of court ordered medications  LORazepam     Tablet 2 milliGRAM(s) Oral every 4 hours PRN agitation  LORazepam   Injectable 2 milliGRAM(s) IntraMuscular once PRN agitation  nicotine  Polacrilex Gum 2 milliGRAM(s) Oral every 4 hours PRN nrt  prochlorperazine   Injectable 10 milliGRAM(s) IntraMuscular once PRN nausea  prochlorperazine   Tablet 10 milliGRAM(s) Oral every 8 hours PRN nausea  
MEDICATIONS  (PRN):  aluminum hydroxide/magnesium hydroxide/simethicone Suspension 30 milliLiter(s) Oral every 6 hours PRN Dyspepsia  diphenhydrAMINE 50 milliGRAM(s) Oral every 6 hours PRN agitation  diphenhydrAMINE Injectable 50 milliGRAM(s) IntraMuscular once PRN agitation  haloperidol     Tablet 5 milliGRAM(s) Oral every 4 hours PRN agitation  haloperidol    Injectable 5 milliGRAM(s) IntraMuscular once PRN agitation / patient refusal of court ordered medications  LORazepam     Tablet 2 milliGRAM(s) Oral every 4 hours PRN agitation  LORazepam   Injectable 2 milliGRAM(s) IntraMuscular once PRN agitation  nicotine  Polacrilex Gum 2 milliGRAM(s) Oral every 4 hours PRN nrt  prochlorperazine   Injectable 10 milliGRAM(s) IntraMuscular once PRN nausea  prochlorperazine   Tablet 10 milliGRAM(s) Oral every 8 hours PRN nausea  
MEDICATIONS  (PRN):  aluminum hydroxide/magnesium hydroxide/simethicone Suspension 30 milliLiter(s) Oral every 6 hours PRN Dyspepsia  chlorproMAZINE    Injectable 50 milliGRAM(s) IntraMuscular once PRN agitation  chlorproMAZINE    Tablet 50 milliGRAM(s) Oral every 4 hours PRN agitation  diphenhydrAMINE 50 milliGRAM(s) Oral every 6 hours PRN agitation  diphenhydrAMINE Injectable 50 milliGRAM(s) IntraMuscular once PRN agitation  LORazepam     Tablet 2 milliGRAM(s) Oral every 4 hours PRN agitation  LORazepam   Injectable 2 milliGRAM(s) IntraMuscular once PRN agitation  nicotine  Polacrilex Gum 2 milliGRAM(s) Oral every 4 hours PRN nrt  prochlorperazine   Injectable 10 milliGRAM(s) IntraMuscular once PRN nausea  prochlorperazine   Tablet 10 milliGRAM(s) Oral every 8 hours PRN nausea  
MEDICATIONS  (PRN):  aluminum hydroxide/magnesium hydroxide/simethicone Suspension 30 milliLiter(s) Oral every 6 hours PRN Dyspepsia  diphenhydrAMINE 50 milliGRAM(s) Oral every 6 hours PRN agitation  diphenhydrAMINE Injectable 50 milliGRAM(s) IntraMuscular once PRN agitation  haloperidol     Tablet 5 milliGRAM(s) Oral every 6 hours PRN agitation  haloperidol    Injectable 5 milliGRAM(s) IntraMuscular once PRN severe agitation  LORazepam     Tablet 2 milliGRAM(s) Oral every 6 hours PRN Agitation  LORazepam   Injectable 2 milliGRAM(s) IntraMuscular once PRN agitation  nicotine  Polacrilex Gum 2 milliGRAM(s) Oral every 4 hours PRN nrt  prochlorperazine   Injectable 10 milliGRAM(s) IntraMuscular once PRN nausea  prochlorperazine   Tablet 10 milliGRAM(s) Oral every 8 hours PRN nausea  
MEDICATIONS  (PRN):  aluminum hydroxide/magnesium hydroxide/simethicone Suspension 30 milliLiter(s) Oral every 6 hours PRN Dyspepsia  diphenhydrAMINE 50 milliGRAM(s) Oral every 6 hours PRN agitation  diphenhydrAMINE Injectable 50 milliGRAM(s) IntraMuscular once PRN agitation  haloperidol     Tablet 5 milliGRAM(s) Oral every 6 hours PRN agitation  haloperidol    Injectable 5 milliGRAM(s) IntraMuscular once PRN severe agitation  LORazepam     Tablet 2 milliGRAM(s) Oral every 6 hours PRN Agitation  LORazepam   Injectable 2 milliGRAM(s) IntraMuscular once PRN agitation  nicotine  Polacrilex Gum 2 milliGRAM(s) Oral every 4 hours PRN nrt  prochlorperazine   Injectable 10 milliGRAM(s) IntraMuscular once PRN nausea  prochlorperazine   Tablet 10 milliGRAM(s) Oral every 8 hours PRN nausea  
MEDICATIONS  (PRN):  diphenhydrAMINE 50 milliGRAM(s) Oral every 6 hours PRN agitation  diphenhydrAMINE Injectable 50 milliGRAM(s) IntraMuscular once PRN agitation  haloperidol     Tablet 5 milliGRAM(s) Oral every 6 hours PRN agitation  haloperidol    Injectable 5 milliGRAM(s) IntraMuscular once PRN severe agitation  LORazepam     Tablet 2 milliGRAM(s) Oral every 6 hours PRN Agitation  LORazepam   Injectable 2 milliGRAM(s) IntraMuscular Once PRN severe agitation  nicotine  Polacrilex Gum 2 milliGRAM(s) Oral every 4 hours PRN nrt  prochlorperazine   Injectable 10 milliGRAM(s) IntraMuscular once PRN nausea  prochlorperazine   Tablet 10 milliGRAM(s) Oral every 8 hours PRN nausea  
MEDICATIONS  (PRN):  aluminum hydroxide/magnesium hydroxide/simethicone Suspension 30 milliLiter(s) Oral every 6 hours PRN Dyspepsia  diphenhydrAMINE 50 milliGRAM(s) Oral every 6 hours PRN agitation  diphenhydrAMINE Injectable 50 milliGRAM(s) IntraMuscular once PRN agitation  haloperidol     Tablet 5 milliGRAM(s) Oral every 6 hours PRN agitation  haloperidol    Injectable 5 milliGRAM(s) IntraMuscular once PRN severe agitation  LORazepam     Tablet 2 milliGRAM(s) Oral every 6 hours PRN Agitation  LORazepam   Injectable 2 milliGRAM(s) IntraMuscular once PRN agitation  nicotine  Polacrilex Gum 2 milliGRAM(s) Oral every 4 hours PRN nrt  prochlorperazine   Injectable 10 milliGRAM(s) IntraMuscular once PRN nausea  prochlorperazine   Tablet 10 milliGRAM(s) Oral every 8 hours PRN nausea  
MEDICATIONS  (PRN):  diphenhydrAMINE 50 milliGRAM(s) Oral every 6 hours PRN agitation  diphenhydrAMINE Injectable 50 milliGRAM(s) IntraMuscular once PRN agitation  diphenhydrAMINE Injectable 50 milliGRAM(s) IntraMuscular once PRN agitation  haloperidol     Tablet 5 milliGRAM(s) Oral every 6 hours PRN agitation  haloperidol    Injectable 5 milliGRAM(s) IntraMuscular once PRN severe agitation  haloperidol    Injectable 5 milliGRAM(s) IntraMuscular once PRN severe agitation  LORazepam     Tablet 2 milliGRAM(s) Oral every 6 hours PRN Agitation  LORazepam   Injectable 2 milliGRAM(s) IntraMuscular Once PRN severe agitation  LORazepam   Injectable 2 milliGRAM(s) IntraMuscular Once PRN severe agitation  nicotine  Polacrilex Gum 2 milliGRAM(s) Oral every 4 hours PRN nrt  
MEDICATIONS  (PRN):  haloperidol     Tablet 5 milliGRAM(s) Oral every 6 hours PRN agitation  haloperidol    Injectable 5 milliGRAM(s) IntraMuscular once PRN severe agitation  LORazepam     Tablet 2 milliGRAM(s) Oral every 6 hours PRN Agitation  LORazepam     Tablet 2 milliGRAM(s) Oral every 2 hours PRN CIWA score increase by 2 points and current CIWA score GREATER THAN 9  LORazepam   Injectable 2 milliGRAM(s) IntraMuscular Once PRN severe agitation  nicotine  Polacrilex Gum 2 milliGRAM(s) Oral every 4 hours PRN nrt  
MEDICATIONS  (PRN):  haloperidol     Tablet 5 milliGRAM(s) Oral every 6 hours PRN agitation  haloperidol    Injectable 5 milliGRAM(s) IntraMuscular once PRN severe agitation  LORazepam     Tablet 2 milliGRAM(s) Oral every 6 hours PRN Agitation  LORazepam     Tablet 2 milliGRAM(s) Oral every 2 hours PRN CIWA score increase by 2 points and current CIWA score GREATER THAN 9  LORazepam   Injectable 2 milliGRAM(s) IntraMuscular Once PRN severe agitation  nicotine  Polacrilex Gum 2 milliGRAM(s) Oral every 4 hours PRN nrt  
MEDICATIONS  (PRN):  aluminum hydroxide/magnesium hydroxide/simethicone Suspension 30 milliLiter(s) Oral every 6 hours PRN Dyspepsia  chlorproMAZINE    Injectable 50 milliGRAM(s) IntraMuscular once PRN agitation  chlorproMAZINE    Injectable 50 milliGRAM(s) IntraMuscular once PRN agitation  chlorproMAZINE    Tablet 50 milliGRAM(s) Oral every 4 hours PRN agitation  diphenhydrAMINE 50 milliGRAM(s) Oral every 6 hours PRN agitation  diphenhydrAMINE Injectable 50 milliGRAM(s) IntraMuscular once PRN agitation  LORazepam     Tablet 2 milliGRAM(s) Oral every 4 hours PRN agitation  LORazepam   Injectable 2 milliGRAM(s) IntraMuscular once PRN agitation  LORazepam   Injectable 2 milliGRAM(s) IntraMuscular once PRN agitation  nicotine  Polacrilex Gum 2 milliGRAM(s) Oral every 4 hours PRN nrt  prochlorperazine   Injectable 10 milliGRAM(s) IntraMuscular once PRN nausea  prochlorperazine   Tablet 10 milliGRAM(s) Oral every 8 hours PRN nausea  
MEDICATIONS  (PRN):  aluminum hydroxide/magnesium hydroxide/simethicone Suspension 30 milliLiter(s) Oral every 6 hours PRN Dyspepsia  diphenhydrAMINE 50 milliGRAM(s) Oral every 6 hours PRN agitation  diphenhydrAMINE Injectable 50 milliGRAM(s) IntraMuscular once PRN agitation  haloperidol     Tablet 5 milliGRAM(s) Oral every 4 hours PRN agitation  haloperidol    Injectable 5 milliGRAM(s) IntraMuscular once PRN agitation / patient refusal of court ordered medications  LORazepam     Tablet 2 milliGRAM(s) Oral every 4 hours PRN agitation  LORazepam   Injectable 2 milliGRAM(s) IntraMuscular once PRN agitation  nicotine  Polacrilex Gum 2 milliGRAM(s) Oral every 4 hours PRN nrt  prochlorperazine   Injectable 10 milliGRAM(s) IntraMuscular once PRN nausea  prochlorperazine   Tablet 10 milliGRAM(s) Oral every 8 hours PRN nausea  
MEDICATIONS  (PRN):  aluminum hydroxide/magnesium hydroxide/simethicone Suspension 30 milliLiter(s) Oral every 6 hours PRN Dyspepsia  diphenhydrAMINE 50 milliGRAM(s) Oral every 6 hours PRN agitation  diphenhydrAMINE Injectable 50 milliGRAM(s) IntraMuscular once PRN agitation  haloperidol     Tablet 5 milliGRAM(s) Oral every 6 hours PRN agitation  haloperidol    Injectable 5 milliGRAM(s) IntraMuscular once PRN severe agitation  LORazepam     Tablet 2 milliGRAM(s) Oral every 6 hours PRN Agitation  LORazepam   Injectable 2 milliGRAM(s) IntraMuscular once PRN agitation  nicotine  Polacrilex Gum 2 milliGRAM(s) Oral every 4 hours PRN nrt  prochlorperazine   Injectable 10 milliGRAM(s) IntraMuscular once PRN nausea  prochlorperazine   Tablet 10 milliGRAM(s) Oral every 8 hours PRN nausea  
MEDICATIONS  (PRN):  haloperidol     Tablet 5 milliGRAM(s) Oral every 6 hours PRN agitation  haloperidol    Injectable 5 milliGRAM(s) IntraMuscular once PRN severe agitation  LORazepam     Tablet 2 milliGRAM(s) Oral every 6 hours PRN Agitation  LORazepam     Tablet 2 milliGRAM(s) Oral every 2 hours PRN CIWA score increase by 2 points and current CIWA score GREATER THAN 9  LORazepam   Injectable 2 milliGRAM(s) IntraMuscular Once PRN severe agitation  nicotine  Polacrilex Gum 2 milliGRAM(s) Oral every 4 hours PRN nrt  
MEDICATIONS  (PRN):  aluminum hydroxide/magnesium hydroxide/simethicone Suspension 30 milliLiter(s) Oral every 6 hours PRN Dyspepsia  diphenhydrAMINE 50 milliGRAM(s) Oral every 6 hours PRN agitation  diphenhydrAMINE Injectable 50 milliGRAM(s) IntraMuscular once PRN agitation  haloperidol     Tablet 5 milliGRAM(s) Oral every 6 hours PRN agitation  haloperidol    Injectable 5 milliGRAM(s) IntraMuscular once PRN severe agitation  LORazepam     Tablet 2 milliGRAM(s) Oral every 6 hours PRN Agitation  nicotine  Polacrilex Gum 2 milliGRAM(s) Oral every 4 hours PRN nrt  prochlorperazine   Injectable 10 milliGRAM(s) IntraMuscular once PRN nausea  prochlorperazine   Tablet 10 milliGRAM(s) Oral every 8 hours PRN nausea  
MEDICATIONS  (PRN):  aluminum hydroxide/magnesium hydroxide/simethicone Suspension 30 milliLiter(s) Oral every 6 hours PRN Dyspepsia  diphenhydrAMINE 50 milliGRAM(s) Oral every 6 hours PRN agitation  diphenhydrAMINE Injectable 50 milliGRAM(s) IntraMuscular once PRN agitation  haloperidol     Tablet 5 milliGRAM(s) Oral every 4 hours PRN agitation  haloperidol    Injectable 5 milliGRAM(s) IntraMuscular once PRN agitation / patient refusal of court ordered medications  LORazepam     Tablet 2 milliGRAM(s) Oral every 4 hours PRN agitation  LORazepam   Injectable 2 milliGRAM(s) IntraMuscular once PRN agitation  nicotine  Polacrilex Gum 2 milliGRAM(s) Oral every 4 hours PRN nrt  prochlorperazine   Injectable 10 milliGRAM(s) IntraMuscular once PRN nausea  prochlorperazine   Tablet 10 milliGRAM(s) Oral every 8 hours PRN nausea  
MEDICATIONS  (PRN):  haloperidol     Tablet 5 milliGRAM(s) Oral every 6 hours PRN agitation  haloperidol    Injectable 5 milliGRAM(s) IntraMuscular once PRN severe agitation  LORazepam     Tablet 2 milliGRAM(s) Oral every 6 hours PRN Agitation  LORazepam     Tablet 2 milliGRAM(s) Oral every 2 hours PRN CIWA score increase by 2 points and current CIWA score GREATER THAN 9  LORazepam   Injectable 2 milliGRAM(s) IntraMuscular Once PRN severe agitation  nicotine  Polacrilex Gum 2 milliGRAM(s) Oral every 4 hours PRN nrt

## 2022-02-22 NOTE — BH CONSULTATION LIAISON ASSESSMENT NOTE - HPI (INCLUDE ILLNESS QUALITY, SEVERITY, DURATION, TIMING, CONTEXT, MODIFYING FACTORS, ASSOCIATED SIGNS AND SYMPTOMS)
Patient is a 21 yo male no significant PMHX. PPHx of Autism, schizoaffective disorder. Patient coming from [facility], however he resides in [place]. As per chart review, patient reportedly has 30-40 hospitalizations. follows up with a PACT team in [place], no substance use, no trauma hx. This past [facility] admission January 2022, patient was brought in after attempting to fly out of [facility] to go to [place] to meet his two wives ( + delusions) without a plane ticket.  At that time patient was also with + AH, hearing voices telling him to fly to [place]. During his time at [facility], patient was sent to court for treatment over objection which was granted with meds that included Risperdal PO, Invega Sustenna, Depakote, Ativan, Cogentin. Invega Sustenna 234mg was given to patient on 2/16/22. Other meds he’s currently receiving includes Risperdal 1mg BID, Depakote 1250mg BID, Lorazepam 1mg qhs.    Patient sent to the ED  due to L. Big toe pain + swelling + laceration.  patient attempted to jump into nursing station, fell awkwardly on his toe.  ED consulted podiatry and they manually reduced the area and recommended IV antibiotics.      Patient was seen and assessed at bedside. CO in place.  Patient is calm, cooperative at this time. He is able to state his name, where he is, date, states he is here as he tried to hop a fence and hurt his toe.  Patient without PRN in the ED.  Patient is a limited historian at baseline and is concrete and simple on interview.  Patient denies issues with mood, Patient denies any depressive symptoms including depressed mood, anhedonia, changes in energy/concentration/appetite, sleep disturbances, or feelings of guilt. No dana noted.  Patient does report infrequent AH, but denies AH at this time. Unclear when last experienced hallucinations. Patient with no SI or HI at this time.         Collateral from Mother in  Note.  PACT collateral from [facility] prior to  admission    PACT Team in [place] ([place] is a PACT contact, phone number is office: [phone] ext. [phone], emergency line: [phone]) - Patient since 10/2021. At baseline patient is on the spectrum, he's limited as well. The team has been watching him taking his morning medication 3 days a week and his father watches him take his medications the rest of the time. Father is perceived to be limited. Shivam was last seen on 01/24/2022 and was clean, appropriate and calm. Restricted affect, normal speech. Father is usually intrusive when team is present. 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 Typical of patient to exhibit perseverative speech "I'll be like, I'll be like, I'll be like". Firefox and [employer] are two delusional figments. In the past he has left the house to meet these girls impulsively and got readmitted to the hospital. Concern that patient is abusing alcohol. Given diagnosis of Schizoaffective disorder, bipolar type. In the past has been suicidal but not recently.   Given the fact that he traveled across state lines, PACT team feels he's an acute danger and most likely should be rehospitalized.   Meds: Depakote 1500mg po qd, risperdal 3mg po bid, clonidine hcl 0.1mg po tid and ativan 1mg po qhs Patient is a 21 yo male no significant PMHX. PPHx of Autism, schizoaffective disorder. Patient coming from [facility], however he resides in [place]. As per chart review, patient reportedly has 30-40 hospitalizations. follows up with a PACT team in [place], no substance use, no trauma hx. This past [facility] admission January 2022, patient was brought in after attempting to fly out of [facility] to go to [place] to meet his two wives ( + delusions) without a plane ticket.  At that time patient was also with + AH, hearing voices telling him to fly to [place]. During his time at [facility], patient was sent to court for treatment over objection which was granted with meds that included Risperdal PO, Invega Sustenna, Depakote, Ativan, Cogentin. Invega Sustenna 234mg was given to patient on 2/16/22. Other meds he’s currently receiving includes Risperdal 1mg BID, Depakote 1250mg qhs, Lorazepam 1mg qhs.    Patient sent to the ED  due to L. Big toe pain + swelling + laceration.  patient attempted to jump into nursing station, fell awkwardly on his toe.  ED consulted podiatry and they manually reduced the area and recommended IV antibiotics.      Patient was seen and assessed at bedside. CO in place.  Patient is calm, cooperative at this time. He is able to state his name, where he is, date, states he is here as he tried to hop a fence and hurt his toe.  Patient without PRN in the ED.  Patient is a limited historian at baseline and is concrete and simple on interview.  Patient denies issues with mood, Patient denies any depressive symptoms including depressed mood, anhedonia, changes in energy/concentration/appetite, sleep disturbances, or feelings of guilt. No dana noted.  Patient does report infrequent AH, but denies AH at this time. Unclear when last experienced hallucinations. Patient with no SI or HI at this time.         Collateral from Mother in  Note.  PACT collateral from [facility] prior to  admission    PACT Team in [place] ([place] is a PACT contact, phone number is office: [phone] ext. [phone], emergency line: [phone]) - Patient since 10/2021. At baseline patient is on the spectrum, he's limited as well. The team has been watching him taking his morning medication 3 days a week and his father watches him take his medications the rest of the time. Father is perceived to be limited. Shivam was last seen on 01/24/2022 and was clean, appropriate and calm. Restricted affect, normal speech. Father is usually intrusive when team is present. 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 Typical of patient to exhibit perseverative speech "I'll be like, I'll be like, I'll be like". Firefox and [employer] are two delusional figments. In the past he has left the house to meet these girls impulsively and got readmitted to the hospital. Concern that patient is abusing alcohol. Given diagnosis of Schizoaffective disorder, bipolar type. In the past has been suicidal but not recently.   Given the fact that he traveled across state lines, PACT team feels he's an acute danger and most likely should be rehospitalized.   Meds: Depakote 1500mg po qd, risperdal 3mg po bid, clonidine hcl 0.1mg po tid and ativan 1mg po qhs

## 2022-02-22 NOTE — PHARMACOTHERAPY INTERVENTION NOTE - COMMENTS
Medication history is complete. Medication list updated in Outpatient Medication Record (OMR). Please call spectra [id number] if you have any questions.   source: [facility] medical record

## 2022-02-22 NOTE — BH CONSULTATION LIAISON ASSESSMENT NOTE - NSBHCHARTREVIEWLAB_PSY_A_CORE FT
13.8   6.48  )-----------( 234      ( 22 Feb 2022 14:57 )             40.7   02-22    138  |  103  |  11  ----------------------------<  85  4.4   |  25  |  0.97    Ca    9.1      22 Feb 2022 14:57    TPro  6.8  /  Alb  3.9  /  TBili  0.5  /  DBili  x   /  AST  19  /  ALT  10  /  AlkPhos  59  02-22

## 2022-02-22 NOTE — BH INPATIENT PSYCHIATRY DISCHARGE NOTE - NSDCMRMEDTOKEN_GEN_ALL_CORE_FT
cloNIDine 0.1 mg oral tablet: 1 tab(s) orally 3 times a day  diphenhydrAMINE 50 mg oral capsule: 1 cap(s) orally every 6 hours, As Needed - for agitation  diphenhydrAMINE 50 mg/mL injectable solution: 50 milligram(s) injectable once, As Needed - for agitation  divalproex sodium 250 mg oral tablet, extended release: 1 tab(s) orally once a day (at bedtime)  Total daily dose 1250mg at bedtime  divalproex sodium 500 mg oral tablet, extended release: 2 tab(s) orally once a day (at bedtime)  Total daily dose 1250mg at bedtime  haloperidol 5 mg oral tablet: 1 tab(s) orally every 4 hours, As Needed - for agitation  haloperidol 5 mg/mL injectable solution: 5 milligram(s) intramuscular once, As Needed - for agitation or patient refusal of court ordered medications  LORazepam 1 mg oral tablet: 1 tab(s) orally once a day (at bedtime)  LORazepam 2 mg oral tablet: 1 tab(s) orally every 4 hours, As Needed - for agitation  LORazepam 2 mg/mL injectable solution: 2 milligram(s) intramuscular once, As Needed - for agitation  Mylanta oral suspension: 30 milliliter(s) orally every 6 hours, As Needed - for dyspepsia  nicotine 2 mg oral transmucosal gum: 1 gum oral transmucosal every 4 hours, As Needed - nicotine replacement therapy  prochlorperazine 10 mg oral tablet: 1 tab(s) orally every 8 hours, As Needed - for nausea  prochlorperazine 5 mg/mL injectable solution: 10 milligram(s) intramuscular once, As Needed - for nausea  risperiDONE 1 mg oral tablet: 1 tab(s) orally 2 times a day

## 2022-02-22 NOTE — ED ADULT NURSE NOTE - OBJECTIVE STATEMENT
Pt arrives from [facility] (accompanied by staff) s/p injury to left big toe. As per staff pt tried climbing on nurses station and fell. Pt noted to have laceration to toe with bone exposure. Podiatry at bedside for reduction. 20g IV placed in left AC, labs sent.

## 2022-02-22 NOTE — BH INPATIENT PSYCHIATRY DISCHARGE NOTE - NSDCCPCAREPLAN_GEN_ALL_CORE_FT
PRINCIPAL DISCHARGE DIAGNOSIS  Diagnosis: Schizoaffective disorder  Assessment and Plan of Treatment: Too Jarrett 234mg (received 2/16/22)      SECONDARY DISCHARGE DIAGNOSES  Diagnosis: Autism  Assessment and Plan of Treatment:

## 2022-02-22 NOTE — H&P ADULT - PROBLEM SELECTOR PLAN 2
- At [facility] to take court ordered medications after trying to enter airport without a plane ticket to get to [facility] to see family  - Currently social issue that patient must have a surgical shoe, can not have while at [facility].  - Psych called to follow during admission.  - Maintain 1:1 until psych eval.

## 2022-02-22 NOTE — ED ADULT NURSE REASSESSMENT NOTE - REASSESS COMMUNICATION
Will be on 1:1 once admitted and staff member from [facility]. Report to [facility] [place].

## 2022-02-22 NOTE — BH INPATIENT PSYCHIATRY PROGRESS NOTE - CURRENT MEDICATION
MEDICATIONS  (STANDING):  cloNIDine 0.1 milliGRAM(s) Oral three times a day  diVALproex ER 1250 milliGRAM(s) Oral at bedtime  LORazepam     Tablet 1 milliGRAM(s) Oral at bedtime  risperiDONE   Tablet 1 milliGRAM(s) Oral two times a day    MEDICATIONS  (PRN):  aluminum hydroxide/magnesium hydroxide/simethicone Suspension 30 milliLiter(s) Oral every 6 hours PRN Dyspepsia  diphenhydrAMINE 50 milliGRAM(s) Oral every 6 hours PRN agitation  diphenhydrAMINE Injectable 50 milliGRAM(s) IntraMuscular once PRN agitation  haloperidol     Tablet 5 milliGRAM(s) Oral every 4 hours PRN agitation  haloperidol    Injectable 5 milliGRAM(s) IntraMuscular once PRN agitation / patient refusal of court ordered medications  LORazepam     Tablet 2 milliGRAM(s) Oral every 4 hours PRN agitation  LORazepam   Injectable 2 milliGRAM(s) IntraMuscular once PRN agitation  nicotine  Polacrilex Gum 2 milliGRAM(s) Oral every 4 hours PRN nrt  prochlorperazine   Injectable 10 milliGRAM(s) IntraMuscular once PRN nausea  prochlorperazine   Tablet 10 milliGRAM(s) Oral every 8 hours PRN nausea  
MEDICATIONS  (STANDING):  cloNIDine 0.1 milliGRAM(s) Oral three times a day  diVALproex ER 1250 milliGRAM(s) Oral at bedtime  LORazepam     Tablet 1 milliGRAM(s) Oral at bedtime  risperiDONE   Tablet 3 milliGRAM(s) Oral two times a day    MEDICATIONS  (PRN):  aluminum hydroxide/magnesium hydroxide/simethicone Suspension 30 milliLiter(s) Oral every 6 hours PRN Dyspepsia  diphenhydrAMINE 50 milliGRAM(s) Oral every 6 hours PRN agitation  diphenhydrAMINE Injectable 50 milliGRAM(s) IntraMuscular once PRN agitation  haloperidol     Tablet 5 milliGRAM(s) Oral every 6 hours PRN agitation  haloperidol    Injectable 5 milliGRAM(s) IntraMuscular once PRN severe agitation  LORazepam     Tablet 2 milliGRAM(s) Oral every 6 hours PRN Agitation  LORazepam   Injectable 2 milliGRAM(s) IntraMuscular once PRN agitation  nicotine  Polacrilex Gum 2 milliGRAM(s) Oral every 4 hours PRN nrt  prochlorperazine   Injectable 10 milliGRAM(s) IntraMuscular once PRN nausea  prochlorperazine   Tablet 10 milliGRAM(s) Oral every 8 hours PRN nausea  
MEDICATIONS  (STANDING):  cloNIDine 0.1 milliGRAM(s) Oral three times a day  diVALproex ER 1250 milliGRAM(s) Oral at bedtime  LORazepam     Tablet 1 milliGRAM(s) Oral at bedtime  risperiDONE   Tablet 4 milliGRAM(s) Oral at bedtime  risperiDONE   Tablet 2 milliGRAM(s) Oral daily    MEDICATIONS  (PRN):  aluminum hydroxide/magnesium hydroxide/simethicone Suspension 30 milliLiter(s) Oral every 6 hours PRN Dyspepsia  chlorproMAZINE    Injectable 50 milliGRAM(s) IntraMuscular once PRN agitation  chlorproMAZINE    Injectable 50 milliGRAM(s) IntraMuscular once PRN agitation  chlorproMAZINE    Tablet 50 milliGRAM(s) Oral every 4 hours PRN agitation  diphenhydrAMINE 50 milliGRAM(s) Oral every 6 hours PRN agitation  diphenhydrAMINE Injectable 50 milliGRAM(s) IntraMuscular once PRN agitation  LORazepam     Tablet 2 milliGRAM(s) Oral every 4 hours PRN agitation  LORazepam   Injectable 2 milliGRAM(s) IntraMuscular once PRN agitation  LORazepam   Injectable 2 milliGRAM(s) IntraMuscular once PRN agitation  nicotine  Polacrilex Gum 2 milliGRAM(s) Oral every 4 hours PRN nrt  prochlorperazine   Injectable 10 milliGRAM(s) IntraMuscular once PRN nausea  prochlorperazine   Tablet 10 milliGRAM(s) Oral every 8 hours PRN nausea  
MEDICATIONS  (STANDING):  cloNIDine 0.1 milliGRAM(s) Oral three times a day  diVALproex ER 1250 milliGRAM(s) Oral at bedtime  LORazepam     Tablet 1 milliGRAM(s) Oral at bedtime  risperiDONE   Tablet 3 milliGRAM(s) Oral two times a day    MEDICATIONS  (PRN):  aluminum hydroxide/magnesium hydroxide/simethicone Suspension 30 milliLiter(s) Oral every 6 hours PRN Dyspepsia  diphenhydrAMINE 50 milliGRAM(s) Oral every 6 hours PRN agitation  diphenhydrAMINE Injectable 50 milliGRAM(s) IntraMuscular once PRN agitation  haloperidol     Tablet 5 milliGRAM(s) Oral every 6 hours PRN agitation  haloperidol    Injectable 5 milliGRAM(s) IntraMuscular once PRN severe agitation  LORazepam     Tablet 2 milliGRAM(s) Oral every 6 hours PRN Agitation  LORazepam   Injectable 2 milliGRAM(s) IntraMuscular once PRN agitation  nicotine  Polacrilex Gum 2 milliGRAM(s) Oral every 4 hours PRN nrt  prochlorperazine   Injectable 10 milliGRAM(s) IntraMuscular once PRN nausea  prochlorperazine   Tablet 10 milliGRAM(s) Oral every 8 hours PRN nausea  
MEDICATIONS  (STANDING):  cloNIDine 0.1 milliGRAM(s) Oral three times a day  diVALproex ER 1250 milliGRAM(s) Oral at bedtime  LORazepam     Tablet 1 milliGRAM(s) Oral at bedtime  risperiDONE   Tablet 4 milliGRAM(s) Oral at bedtime  risperiDONE   Tablet 2 milliGRAM(s) Oral daily    MEDICATIONS  (PRN):  aluminum hydroxide/magnesium hydroxide/simethicone Suspension 30 milliLiter(s) Oral every 6 hours PRN Dyspepsia  chlorproMAZINE    Injectable 50 milliGRAM(s) IntraMuscular once PRN agitation  chlorproMAZINE    Tablet 50 milliGRAM(s) Oral every 4 hours PRN agitation  diphenhydrAMINE 50 milliGRAM(s) Oral every 6 hours PRN agitation  diphenhydrAMINE Injectable 50 milliGRAM(s) IntraMuscular once PRN agitation  LORazepam     Tablet 2 milliGRAM(s) Oral every 4 hours PRN agitation  LORazepam   Injectable 2 milliGRAM(s) IntraMuscular once PRN agitation  nicotine  Polacrilex Gum 2 milliGRAM(s) Oral every 4 hours PRN nrt  prochlorperazine   Injectable 10 milliGRAM(s) IntraMuscular once PRN nausea  prochlorperazine   Tablet 10 milliGRAM(s) Oral every 8 hours PRN nausea  
MEDICATIONS  (STANDING):  cloNIDine 0.1 milliGRAM(s) Oral three times a day  diVALproex ER 1250 milliGRAM(s) Oral at bedtime  LORazepam     Tablet 1 milliGRAM(s) Oral at bedtime  risperiDONE   Tablet 4 milliGRAM(s) Oral at bedtime  risperiDONE   Tablet 2 milliGRAM(s) Oral daily    MEDICATIONS  (PRN):  aluminum hydroxide/magnesium hydroxide/simethicone Suspension 30 milliLiter(s) Oral every 6 hours PRN Dyspepsia  diphenhydrAMINE 50 milliGRAM(s) Oral every 6 hours PRN agitation  diphenhydrAMINE Injectable 50 milliGRAM(s) IntraMuscular once PRN agitation  haloperidol     Tablet 5 milliGRAM(s) Oral every 6 hours PRN agitation  haloperidol    Injectable 5 milliGRAM(s) IntraMuscular once PRN severe agitation  LORazepam     Tablet 2 milliGRAM(s) Oral every 6 hours PRN Agitation  LORazepam   Injectable 2 milliGRAM(s) IntraMuscular once PRN agitation  nicotine  Polacrilex Gum 2 milliGRAM(s) Oral every 4 hours PRN nrt  prochlorperazine   Injectable 10 milliGRAM(s) IntraMuscular once PRN nausea  prochlorperazine   Tablet 10 milliGRAM(s) Oral every 8 hours PRN nausea  
MEDICATIONS  (STANDING):  cloNIDine 0.1 milliGRAM(s) Oral three times a day  diVALproex ER 1250 milliGRAM(s) Oral at bedtime  LORazepam     Tablet 1 milliGRAM(s) Oral at bedtime  risperiDONE   Tablet 4 milliGRAM(s) Oral at bedtime  risperiDONE   Tablet 3 milliGRAM(s) Oral daily    MEDICATIONS  (PRN):  haloperidol     Tablet 5 milliGRAM(s) Oral every 6 hours PRN agitation  haloperidol    Injectable 5 milliGRAM(s) IntraMuscular once PRN severe agitation  LORazepam     Tablet 2 milliGRAM(s) Oral every 6 hours PRN Agitation  LORazepam     Tablet 2 milliGRAM(s) Oral every 2 hours PRN CIWA score increase by 2 points and current CIWA score GREATER THAN 9  LORazepam   Injectable 2 milliGRAM(s) IntraMuscular Once PRN severe agitation  nicotine  Polacrilex Gum 2 milliGRAM(s) Oral every 4 hours PRN nrt  
MEDICATIONS  (STANDING):  cloNIDine 0.1 milliGRAM(s) Oral three times a day  diVALproex ER 1250 milliGRAM(s) Oral at bedtime  LORazepam     Tablet 1 milliGRAM(s) Oral at bedtime  risperiDONE   Tablet 1 milliGRAM(s) Oral two times a day    MEDICATIONS  (PRN):  aluminum hydroxide/magnesium hydroxide/simethicone Suspension 30 milliLiter(s) Oral every 6 hours PRN Dyspepsia  diphenhydrAMINE 50 milliGRAM(s) Oral every 6 hours PRN agitation  diphenhydrAMINE Injectable 50 milliGRAM(s) IntraMuscular once PRN agitation  haloperidol     Tablet 5 milliGRAM(s) Oral every 4 hours PRN agitation  haloperidol    Injectable 5 milliGRAM(s) IntraMuscular once PRN agitation / patient refusal of court ordered medications  LORazepam     Tablet 2 milliGRAM(s) Oral every 4 hours PRN agitation  LORazepam   Injectable 2 milliGRAM(s) IntraMuscular once PRN agitation  nicotine  Polacrilex Gum 2 milliGRAM(s) Oral every 4 hours PRN nrt  prochlorperazine   Injectable 10 milliGRAM(s) IntraMuscular once PRN nausea  prochlorperazine   Tablet 10 milliGRAM(s) Oral every 8 hours PRN nausea  
MEDICATIONS  (STANDING):  cloNIDine 0.1 milliGRAM(s) Oral three times a day  diVALproex ER 1250 milliGRAM(s) Oral at bedtime  LORazepam     Tablet 1 milliGRAM(s) Oral at bedtime  risperiDONE   Tablet 3 milliGRAM(s) Oral two times a day    MEDICATIONS  (PRN):  haloperidol     Tablet 5 milliGRAM(s) Oral every 6 hours PRN agitation  haloperidol    Injectable 5 milliGRAM(s) IntraMuscular once PRN severe agitation  LORazepam     Tablet 2 milliGRAM(s) Oral every 6 hours PRN Agitation  LORazepam     Tablet 2 milliGRAM(s) Oral every 2 hours PRN CIWA score increase by 2 points and current CIWA score GREATER THAN 9  LORazepam   Injectable 2 milliGRAM(s) IntraMuscular Once PRN severe agitation  nicotine  Polacrilex Gum 2 milliGRAM(s) Oral every 4 hours PRN nrt  
MEDICATIONS  (STANDING):  cloNIDine 0.1 milliGRAM(s) Oral three times a day  diVALproex ER 1250 milliGRAM(s) Oral at bedtime  LORazepam     Tablet 1 milliGRAM(s) Oral at bedtime  risperiDONE   Tablet 1 milliGRAM(s) Oral two times a day    MEDICATIONS  (PRN):  aluminum hydroxide/magnesium hydroxide/simethicone Suspension 30 milliLiter(s) Oral every 6 hours PRN Dyspepsia  diphenhydrAMINE 50 milliGRAM(s) Oral every 6 hours PRN agitation  diphenhydrAMINE Injectable 50 milliGRAM(s) IntraMuscular once PRN agitation  haloperidol     Tablet 5 milliGRAM(s) Oral every 4 hours PRN agitation  haloperidol    Injectable 5 milliGRAM(s) IntraMuscular once PRN agitation / patient refusal of court ordered medications  LORazepam     Tablet 2 milliGRAM(s) Oral every 4 hours PRN agitation  LORazepam   Injectable 2 milliGRAM(s) IntraMuscular once PRN agitation  nicotine  Polacrilex Gum 2 milliGRAM(s) Oral every 4 hours PRN nrt  prochlorperazine   Injectable 10 milliGRAM(s) IntraMuscular once PRN nausea  prochlorperazine   Tablet 10 milliGRAM(s) Oral every 8 hours PRN nausea  
MEDICATIONS  (STANDING):  cloNIDine 0.1 milliGRAM(s) Oral three times a day  diVALproex ER 1250 milliGRAM(s) Oral at bedtime  LORazepam     Tablet 1 milliGRAM(s) Oral at bedtime  risperiDONE   Tablet 4 milliGRAM(s) Oral at bedtime  risperiDONE   Tablet 2 milliGRAM(s) Oral daily    MEDICATIONS  (PRN):  aluminum hydroxide/magnesium hydroxide/simethicone Suspension 30 milliLiter(s) Oral every 6 hours PRN Dyspepsia  diphenhydrAMINE 50 milliGRAM(s) Oral every 6 hours PRN agitation  diphenhydrAMINE Injectable 50 milliGRAM(s) IntraMuscular once PRN agitation  haloperidol     Tablet 5 milliGRAM(s) Oral every 6 hours PRN agitation  haloperidol    Injectable 5 milliGRAM(s) IntraMuscular once PRN severe agitation  LORazepam     Tablet 2 milliGRAM(s) Oral every 6 hours PRN Agitation  LORazepam   Injectable 2 milliGRAM(s) IntraMuscular once PRN agitation  nicotine  Polacrilex Gum 2 milliGRAM(s) Oral every 4 hours PRN nrt  prochlorperazine   Injectable 10 milliGRAM(s) IntraMuscular once PRN nausea  prochlorperazine   Tablet 10 milliGRAM(s) Oral every 8 hours PRN nausea  
MEDICATIONS  (STANDING):  cloNIDine 0.1 milliGRAM(s) Oral three times a day  diVALproex ER 1250 milliGRAM(s) Oral at bedtime  LORazepam     Tablet 1 milliGRAM(s) Oral at bedtime  risperiDONE   Tablet 3 milliGRAM(s) Oral two times a day    MEDICATIONS  (PRN):  aluminum hydroxide/magnesium hydroxide/simethicone Suspension 30 milliLiter(s) Oral every 6 hours PRN Dyspepsia  diphenhydrAMINE 50 milliGRAM(s) Oral every 6 hours PRN agitation  diphenhydrAMINE Injectable 50 milliGRAM(s) IntraMuscular once PRN agitation  haloperidol     Tablet 5 milliGRAM(s) Oral every 6 hours PRN agitation  haloperidol    Injectable 5 milliGRAM(s) IntraMuscular once PRN severe agitation  LORazepam     Tablet 2 milliGRAM(s) Oral every 6 hours PRN Agitation  LORazepam   Injectable 2 milliGRAM(s) IntraMuscular once PRN agitation  nicotine  Polacrilex Gum 2 milliGRAM(s) Oral every 4 hours PRN nrt  prochlorperazine   Injectable 10 milliGRAM(s) IntraMuscular once PRN nausea  prochlorperazine   Tablet 10 milliGRAM(s) Oral every 8 hours PRN nausea  
MEDICATIONS  (STANDING):  cloNIDine 0.1 milliGRAM(s) Oral three times a day  diVALproex ER 1250 milliGRAM(s) Oral at bedtime  LORazepam     Tablet 1 milliGRAM(s) Oral at bedtime  risperiDONE   Tablet 4 milliGRAM(s) Oral at bedtime  risperiDONE   Tablet 3 milliGRAM(s) Oral daily    MEDICATIONS  (PRN):  diphenhydrAMINE 50 milliGRAM(s) Oral every 6 hours PRN agitation  diphenhydrAMINE Injectable 50 milliGRAM(s) IntraMuscular once PRN agitation  diphenhydrAMINE Injectable 50 milliGRAM(s) IntraMuscular once PRN agitation  haloperidol     Tablet 5 milliGRAM(s) Oral every 6 hours PRN agitation  haloperidol    Injectable 5 milliGRAM(s) IntraMuscular once PRN severe agitation  haloperidol    Injectable 5 milliGRAM(s) IntraMuscular once PRN severe agitation  LORazepam     Tablet 2 milliGRAM(s) Oral every 6 hours PRN Agitation  LORazepam   Injectable 2 milliGRAM(s) IntraMuscular Once PRN severe agitation  LORazepam   Injectable 2 milliGRAM(s) IntraMuscular Once PRN severe agitation  nicotine  Polacrilex Gum 2 milliGRAM(s) Oral every 4 hours PRN nrt  
MEDICATIONS  (STANDING):  cloNIDine 0.1 milliGRAM(s) Oral three times a day  diVALproex ER 1250 milliGRAM(s) Oral at bedtime  LORazepam     Tablet 1 milliGRAM(s) Oral at bedtime  risperiDONE   Tablet 3 milliGRAM(s) Oral two times a day    MEDICATIONS  (PRN):  haloperidol     Tablet 5 milliGRAM(s) Oral every 6 hours PRN agitation  haloperidol    Injectable 5 milliGRAM(s) IntraMuscular once PRN severe agitation  LORazepam     Tablet 2 milliGRAM(s) Oral every 6 hours PRN Agitation  LORazepam     Tablet 2 milliGRAM(s) Oral every 2 hours PRN CIWA score increase by 2 points and current CIWA score GREATER THAN 9  LORazepam   Injectable 2 milliGRAM(s) IntraMuscular Once PRN severe agitation  nicotine  Polacrilex Gum 2 milliGRAM(s) Oral every 4 hours PRN nrt  
MEDICATIONS  (STANDING):  cloNIDine 0.1 milliGRAM(s) Oral three times a day  LORazepam     Tablet 1 milliGRAM(s) Oral at bedtime  risperiDONE  Disintegrating Tablet 3 milliGRAM(s) Oral two times a day  valproic acid 1250 milliGRAM(s) Oral at bedtime    MEDICATIONS  (PRN):  diphenhydrAMINE 50 milliGRAM(s) Oral every 6 hours PRN agitation  diphenhydrAMINE Injectable 50 milliGRAM(s) IntraMuscular once PRN agitation  haloperidol     Tablet 5 milliGRAM(s) Oral every 6 hours PRN agitation  haloperidol    Injectable 5 milliGRAM(s) IntraMuscular once PRN severe agitation  LORazepam     Tablet 2 milliGRAM(s) Oral every 6 hours PRN Agitation  LORazepam   Injectable 2 milliGRAM(s) IntraMuscular Once PRN severe agitation  nicotine  Polacrilex Gum 2 milliGRAM(s) Oral every 4 hours PRN nrt  prochlorperazine   Injectable 10 milliGRAM(s) IntraMuscular once PRN nausea  prochlorperazine   Tablet 10 milliGRAM(s) Oral every 8 hours PRN nausea  
MEDICATIONS  (STANDING):  cloNIDine 0.1 milliGRAM(s) Oral three times a day  LORazepam     Tablet 1 milliGRAM(s) Oral at bedtime  risperiDONE  Disintegrating Tablet 3 milliGRAM(s) Oral two times a day  valproic acid 1250 milliGRAM(s) Oral at bedtime    MEDICATIONS  (PRN):  aluminum hydroxide/magnesium hydroxide/simethicone Suspension 30 milliLiter(s) Oral every 6 hours PRN Dyspepsia  diphenhydrAMINE 50 milliGRAM(s) Oral every 6 hours PRN agitation  diphenhydrAMINE Injectable 50 milliGRAM(s) IntraMuscular once PRN agitation  haloperidol     Tablet 5 milliGRAM(s) Oral every 6 hours PRN agitation  haloperidol    Injectable 5 milliGRAM(s) IntraMuscular once PRN severe agitation  LORazepam     Tablet 2 milliGRAM(s) Oral every 6 hours PRN Agitation  nicotine  Polacrilex Gum 2 milliGRAM(s) Oral every 4 hours PRN nrt  prochlorperazine   Injectable 10 milliGRAM(s) IntraMuscular once PRN nausea  prochlorperazine   Tablet 10 milliGRAM(s) Oral every 8 hours PRN nausea  
MEDICATIONS  (STANDING):  cloNIDine 0.1 milliGRAM(s) Oral three times a day  diVALproex ER 1250 milliGRAM(s) Oral at bedtime  LORazepam     Tablet 1 milliGRAM(s) Oral at bedtime  risperiDONE   Tablet 4 milliGRAM(s) Oral at bedtime  risperiDONE   Tablet 3 milliGRAM(s) Oral daily    MEDICATIONS  (PRN):  haloperidol     Tablet 5 milliGRAM(s) Oral every 6 hours PRN agitation  haloperidol    Injectable 5 milliGRAM(s) IntraMuscular once PRN severe agitation  LORazepam     Tablet 2 milliGRAM(s) Oral every 6 hours PRN Agitation  LORazepam     Tablet 2 milliGRAM(s) Oral every 2 hours PRN CIWA score increase by 2 points and current CIWA score GREATER THAN 9  LORazepam   Injectable 2 milliGRAM(s) IntraMuscular Once PRN severe agitation  nicotine  Polacrilex Gum 2 milliGRAM(s) Oral every 4 hours PRN nrt  
MEDICATIONS  (STANDING):  cloNIDine 0.1 milliGRAM(s) Oral three times a day  diVALproex ER 1250 milliGRAM(s) Oral at bedtime  LORazepam     Tablet 1 milliGRAM(s) Oral at bedtime  risperiDONE   Tablet 3 milliGRAM(s) Oral two times a day    MEDICATIONS  (PRN):  aluminum hydroxide/magnesium hydroxide/simethicone Suspension 30 milliLiter(s) Oral every 6 hours PRN Dyspepsia  diphenhydrAMINE 50 milliGRAM(s) Oral every 6 hours PRN agitation  diphenhydrAMINE Injectable 50 milliGRAM(s) IntraMuscular once PRN agitation  haloperidol     Tablet 5 milliGRAM(s) Oral every 6 hours PRN agitation  haloperidol    Injectable 5 milliGRAM(s) IntraMuscular once PRN severe agitation  LORazepam     Tablet 2 milliGRAM(s) Oral every 6 hours PRN Agitation  LORazepam   Injectable 2 milliGRAM(s) IntraMuscular once PRN agitation  nicotine  Polacrilex Gum 2 milliGRAM(s) Oral every 4 hours PRN nrt  prochlorperazine   Injectable 10 milliGRAM(s) IntraMuscular once PRN nausea  prochlorperazine   Tablet 10 milliGRAM(s) Oral every 8 hours PRN nausea  
MEDICATIONS  (STANDING):  cloNIDine 0.1 milliGRAM(s) Oral three times a day  diVALproex ER 1250 milliGRAM(s) Oral at bedtime  LORazepam     Tablet 1 milliGRAM(s) Oral at bedtime  risperiDONE   Tablet 1 milliGRAM(s) Oral two times a day    MEDICATIONS  (PRN):  aluminum hydroxide/magnesium hydroxide/simethicone Suspension 30 milliLiter(s) Oral every 6 hours PRN Dyspepsia  diphenhydrAMINE 50 milliGRAM(s) Oral every 6 hours PRN agitation  diphenhydrAMINE Injectable 50 milliGRAM(s) IntraMuscular once PRN agitation  haloperidol     Tablet 5 milliGRAM(s) Oral every 4 hours PRN agitation  haloperidol    Injectable 5 milliGRAM(s) IntraMuscular once PRN agitation / patient refusal of court ordered medications  LORazepam     Tablet 2 milliGRAM(s) Oral every 4 hours PRN agitation  LORazepam   Injectable 2 milliGRAM(s) IntraMuscular once PRN agitation  nicotine  Polacrilex Gum 2 milliGRAM(s) Oral every 4 hours PRN nrt  prochlorperazine   Injectable 10 milliGRAM(s) IntraMuscular once PRN nausea  prochlorperazine   Tablet 10 milliGRAM(s) Oral every 8 hours PRN nausea  
MEDICATIONS  (STANDING):  cloNIDine 0.1 milliGRAM(s) Oral three times a day  diVALproex ER 1250 milliGRAM(s) Oral at bedtime  LORazepam     Tablet 1 milliGRAM(s) Oral at bedtime  risperiDONE   Tablet 3 milliGRAM(s) Oral two times a day    MEDICATIONS  (PRN):  aluminum hydroxide/magnesium hydroxide/simethicone Suspension 30 milliLiter(s) Oral every 6 hours PRN Dyspepsia  diphenhydrAMINE 50 milliGRAM(s) Oral every 6 hours PRN agitation  diphenhydrAMINE Injectable 50 milliGRAM(s) IntraMuscular once PRN agitation  haloperidol     Tablet 5 milliGRAM(s) Oral every 6 hours PRN agitation  haloperidol    Injectable 5 milliGRAM(s) IntraMuscular once PRN severe agitation  LORazepam     Tablet 2 milliGRAM(s) Oral every 6 hours PRN Agitation  LORazepam   Injectable 2 milliGRAM(s) IntraMuscular once PRN agitation  nicotine  Polacrilex Gum 2 milliGRAM(s) Oral every 4 hours PRN nrt  prochlorperazine   Injectable 10 milliGRAM(s) IntraMuscular once PRN nausea  prochlorperazine   Tablet 10 milliGRAM(s) Oral every 8 hours PRN nausea

## 2022-02-22 NOTE — BH CONSULTATION LIAISON ASSESSMENT NOTE - DESCRIPTION
Lives with Father in [place], works PT at [employer], parents are , wants to get his GED. some siblings live in [place]

## 2022-02-22 NOTE — BH INPATIENT PSYCHIATRY PROGRESS NOTE - NSTXDISORGDATETRGT_PSY_ALL_CORE
24-Feb-2022
24-Feb-2022
03-Feb-2022
24-Feb-2022
03-Feb-2022
02-Feb-2022
03-Feb-2022
03-Feb-2022
17-Feb-2022
03-Feb-2022
17-Feb-2022

## 2022-02-22 NOTE — BH INPATIENT PSYCHIATRY DISCHARGE NOTE - NSBHDCHANDOFFFT_PSY_ALL_CORE
Patient medically admitted; handoff verbally given to ED staff while patient was in transit to ED. [facility] emailed regarding patient's admission to medicine

## 2022-02-22 NOTE — ED ADULT NURSE REASSESSMENT NOTE - SYMPTOMS
Staff Member states he jumps over nsg station every night. Pt injured lt toe last night jumping over station. Pt calm and cooperative at this time. Slow to respond to questions but answers questions correctly. staff member states he showers and takes care of himself at [facility] without assistance. Dsg. on lt distal foot and toe dry and intact.

## 2022-02-22 NOTE — BH INPATIENT PSYCHIATRY PROGRESS NOTE - NSBHCONTPROVIDER_PSY_ALL_CORE
No...

## 2022-02-22 NOTE — BH INPATIENT PSYCHIATRY PROGRESS NOTE - NSBHFUPINTERVALHXFT_PSY_A_CORE
Patient seen for follow up for disorganization, chart reviewed, and case discussed with treatment team.  The patient remains provocative on the unit, sometimes becoming agitated and threatening with peers.  He has continued to be noncompliant with medications, sometimes needing prn medications to maintain safety.  The patient seems purposefully provocative at times, but seems to also have some delusional thinking influencing his behavior.  Attempted to discuss medications with the patient, but he doesn't seem to listen.  The patient reports eating and sleeping well.
Chart reviewed including pertinent labs, imaging, ekg. case discussed with treatment team.  Over this interval: pt declines medications states "It makes everything worse." Denies symptoms of psychosis, depression, dana 
Chart reviewed including pertinent labs, imaging, ekg. case discussed with treatment team.  Over this interval: declined meds this AM. pt remains disorganized, unable to meaningfully participate in interview, has a perseverative and repetitive quality to his behavior. 
Chart reviewed including pertinent labs, imaging, ekg. case discussed with treatment team.  Over this interval: unable to participate in interview, declines meds. seen following peers around making demands from them. 
Chart reviewed including pertinent labs, imaging, ekg. case discussed with treatment team.  Over this interval: adherent to treatment over this interval. behaviorally tenuous with some provocativeness towards other peers. perseverative about wanting his belongings back 
Interview limited.  Pt repeatedly asks for [employer] items and does not answer any other questions.
chart reviewed. case discussed with nursing staff. Over this interval: interview limited by patient's perseverative speech. intermittent adherence to meds. no behavioral issues. does not endorse AVH.
Chart reviewed including pertinent labs, imaging, ekg. case discussed with treatment team.  Over this interval: no overt behavioral issues. initially refused PO court ordered meds last night, IM med activated, then patient took PO and did not need IM. This AM, took all ordered PO meds. asks for certain belongings to be given to him. 
Chart reviewed including pertinent labs, imaging, ekg. case discussed with treatment team.  Over this interval: pt refusing meds, unable to meaningfully engage in interview due to perseverative speech. states he wants to remain in the hospital and will continue refusing meds. 
Patient seen for follow up for agitation, disorganization, chart reviewed, and case discussed with treatment team.  The patient continues to be impulsive and provocative on the unit with peers, yesterday evening jumped into the nurse's station, and today was provocative towards peer again, needing IM prn medications to maintain safety.  The patient offers no explanation for his behavior, just lists all the things he wants on the unit.  He took medications last night, but refused again this morning.  He states he has no intention to take medications, and disagrees when attempts are made to explain benefits.  The patient reports eating and sleeping well.
Chart reviewed including pertinent labs, imaging, ekg. case discussed with treatment team.  Over this interval: pt received PRN meds this AM due to agitation and conflict with another peer over a yarmulka. Pt otherwise limited with engagement, increased speech latency. Reports poor sleep. Reports normal appetite. walks away when I ask about AVH. denies si and hi 
Chart reviewed including pertinent labs, imaging, ekg. case discussed with treatment team.  Over this interval: pt remains disorganized, refused meds last night and given Haldol IM as part of court order. pt again refused PO meds this AM and given Haldol IM and also Invega Sustenna 234mg IM as part of court order. 
Patient seen for follow up for psychosis and agitation, chart reviewed, and case discussed with treatment team.  No events reported over night.  The patient refused his medications again last night.  He continues to show no insight into his illness.  Discussed again the process for treatment over objections, and he expressed understanding.  Overall behavior improved after taking some medications 2 days ago.  However, he continues to have delusional thinking and AHs that influence his behavior.  The patient reports eating and sleeping well.
Chart reviewed including pertinent labs, imaging, ekg. case discussed with treatment team.  Over this interval: overnight, patient jumped into nursing station, injured his toe now swollen with laceration (not bleeding). pt with intermittent compliance to court ordered meds and requiring IM administration of court ordered meds. Denies AVH. Denies SI and HI. endorses toe pain 
Patient seen for follow up for agitation, disorganization, chart reviewed, and case discussed with treatment team.  The patient needed IM prn medications to maintain safety yesterday afternoon after patient was provocative with peer.  He was calmer the rest of the day and took medications last night and this morning.  On interview, however, he states he is going to stop the medications again.  He states they make him feel slower.  Education provided, and alternative medications discussed with the patient, but he declines.  The patient admits to continuing to hear the voice of God telling him what to do sometimes.  He agrees that this voice is what told him to go to the airport and go to [place].  He still has plans to do this after discharge, even after agreeing that he doesn't know what he will do when he gets there.  The patient reports eating and sleeping well.
Chart reviewed including pertinent labs, imaging, ekg. case discussed with treatment team.  Over this interval: patient threw up yesterday possibly in response to pills. Formulations switched to Depakene and M-tab Risperdal. This AM, patient adherent to meds, appears calmer, interview remains limited due to patient's perseverative speech and impaired insight. 
Chart reviewed including pertinent labs, imaging, ekg. case discussed with treatment team.  Over this interval: pt required IM meds overnight due to agitation and threats directed at staff. Patient this AM states he wants to go home. He took meds this AM with staff encouragements. Interview generally limited due to perseverative speech. Denies SI and HI. Denies AVH. 
Chart reviewed including pertinent labs, imaging, ekg. case discussed with treatment team.  Over this interval: pt initially refused meds this AM, but later requested nurse to give it to him. he perseverates about "straps" that he brought with him and wants them back. Pt denies AVH but seen talking to self. Denies SI and HI 
Patient seen for follow up for agitation, disorganization, chart reviewed, and case discussed with treatment team.  No events reported over night.  The patient had taken most of his medications yesterday, and had a better day, without agitation, without need for prn medications.  However, he again refused medications this morning.  He states he doesn't like them.  Again attempted to discuss alternative medications, but he doesn't want to take any medications.  His thoughts are overall more organized after taking some medications.  However, he continues to have some delusional thinking and AHs.  The patient reports eating and sleeping well.
Chart reviewed including pertinent labs, imaging, ekg. case discussed with treatment team.  Over this interval: pt intermittently adherent to meds, bargains with when he'll take meds, stating I will only take it if you give me a notebook. Patient with poor boundaries, following peer / staff around. neyies MICK.

## 2022-02-22 NOTE — BH INPATIENT PSYCHIATRY PROGRESS NOTE - NSTXMEDICINTERMD_PSY_ALL_CORE
psychoed

## 2022-02-22 NOTE — BH INPATIENT PSYCHIATRY PROGRESS NOTE - NSDCCRITERIA_PSY_ALL_CORE
CGI< / = 3 When pt is no longer an acute or imminent risk of harm to self or others, and is able to care for self safely, pt may then be discharged. 

## 2022-02-22 NOTE — BH INPATIENT PSYCHIATRY PROGRESS NOTE - NSBHASSESSSUMMFT_PSY_ALL_CORE
21yr old Church M, domiciled with father in [place], [place], part-time employed at [employer], psych hx of schizophrenia (as per psyckes) vs. Bipolar I Disorder (as per mother) , ASD, 30-40 past psych hospitalizations (most recently discharged from [place] in 08/2021) and follows up with a PACT team in [place], no substance use, no trauma hx, was MILTON Virgen picked up from [facility] attempting to board a plane to [place] without a ticket.     continues to decline meds, has poor insight into illness, behaviorally unpredictable likely secondary to decompensated psychiatric symptoms. TOO being pursued.     no CO indicated  Continue to offer medications:  Risperdal 3mg BID  Depakote ER 1250mg qhs   Clonidine 0.1mg TID   ongoing psychoed  coordinate with s/w
21yr old Buddhist M, domiciled with father in [place], [place], part-time employed at [employer], psych hx of schizophrenia (as per psyckes) vs. Bipolar I Disorder (as per mother) , ASD, 30-40 past psych hospitalizations ([place] recently [place] from [place] in 08[place]21) and follows up with a PACT team in [place], no substance use, no trauma hx, was MILTON Virgen picked up [facility] attempting to board a plane to [place] without a ticket.     remains perseverative, initially refused court ordered meds last night though ultimately took when given the choice between PO or IM. slight improvement in disorganization though insight + judgement remains limited.     no CO indicated  Continue to offer medications:  Invega Sustenna 234mg given 2/16/22  Risperdal 3mg BID  Depakote ER 1250mg qhs   Clonidine 0.1mg TID   ongoing psychoed  coordinate with s/w
21yr old Religious M, domiciled with father in [place], [place], part-time employed at [employer], psych hx of schizophrenia (as per psyckes) vs. Bipolar I Disorder (as per mother) , ASD, 30-40 past psych hospitalizations ([place] recently [place] from [place] in 08/[place]21) and follows up with a PACT team in [place], no substance use, no trauma hx, was BIB Promise picked up from [facility] attempting to board a plane to [place] without a ticket.     pt unable to meaningfully engage in interview, declines meds, has a perseverative quality to his behavior and speech and difficult to distinguish this as a product of psychosis vs. ASD. no overt delusions expressed by patient, he does not appear to be internally preoccupied, has limited impulse control and with limited frustration tolerance. will continue to encourage patient to adhere to medications     no CO indicated  Risperdal 3mg BID  Depakote ER 1250mg qhs   Clonidine 0.1mg TID   ongoing psychoed  coordinate with s/w  
21yr old Samaritan M, domiciled with father in [place], [place], part-time employed at [employer], psych hx of schizophrenia (as per psyckes) vs. Bipolar I Disorder (as per mother) , ASD, 30-40 past psych hospitalizations ([place] in 08[place]21) and follows up with a PACT team in [place], no substance use, no trauma hx, was MILTON Virgen picked up from [facility] attempting to board a plane to [place] without a ticket.     The patient refused medications yesterday.  His thoughts are somewhat more organized after taking 3 doses of medication prior, but he refused again this morning.  Less provocative behavior after taking medications.  He admits to continued hallucinations and has delusional thinking.  He shows no insight into his illness.  For medications over objections.    no CO indicated  Continue to offer medications:  Risperdal 3mg BID  Depakote ER 1250mg qhs   Clonidine 0.1mg TID   ongoing psychoed  coordinate with s/w
21yr old Samaritan M, domiciled with father in [place], [place], part-time employed at [employer], psych hx of schizophrenia (as per psyckes) vs. Bipolar I Disorder (as per mother) , ASD, 30-40 past psych hospitalizations ([place] recently [place] in 08[place]21) and follows up with a PACT team in [place], no substance use, no trauma hx, was BIB Promise picked up from [facility] attempting to board a plane to [place] without a ticket.     Required IM last night due to agitation. wants to go home. Perseverative speech. Taking meds with staff encouragements. Pt meds switched to formulation that is easier to take but pt now requesting meds be back to his original formulation. Difficult to distinguish behavior of ASD vs. psychosis though patient intermittently expressing delusional content and a times appearing paranoid     no CO indicated  Risperdal 3mg BID  Depakote ER 1250mg qhs   Clonidine 0.1mg TID   ongoing psychoed  coordinate with s/w  
21yr old Jainism M, domiciled with father in [place], [place], part-time employed at [employer], psych hx of schizophrenia (as per psyckes) vs. Bipolar I Disorder (as per mother) , ASD, 30-40 past psych hospitalizations ([place] recently [place] from [place] in 08[place]21) and follows up with a PACT team in [place], no substance use, no trauma hx, was MILTON Virgen picked up from [facility] attempting to board a plane to [place] without a ticket.     Pt continues to be disorganized, continue plan below.    no CO indicated  Risperdal 3mg daily + 4mg qhs  VPA 1250mg qhs   Clonidine 0.1mg TID   ongoing psychoed  coordinate with s/w  obtain collateral 
21yr old Zoroastrianism M, domiciled with father in [place], [place], part-time employed at [employer], psych hx of schizophrenia (as per psyckes) vs. Bipolar I Disorder (as per mother) , ASD, 30-40 past psych hospitalizations ([place] in [place]21) and follows up with a [place]CT team in [place], no substance use, no trauma hx, was MILTON Virgen picked up from [facility] attempting to board a plane to [place] without a ticket.     The patient continues to be disorganized, with provocative behavior on the unit, requiring prn medications.  He admits to hallucinations and has delusional thinking.  Intermittently compliant with medications.  For medications over objections.    no CO indicated  Continue to offer medications:  Risperdal 3mg BID  Depakote ER 1250mg qhs   Clonidine 0.1mg TID   ongoing psychoed  coordinate with s/w
21yr old Restorationism M, domiciled with father in [place], [place], part-time employed at [employer], psych hx of schizophrenia (as per psyckes) vs. Bipolar I Disorder (as per mother) , ASD, 30-40 past psych hospitalizations ([place] recently [place] in 08[place]21) and follows up with a PACT team in [place], no substance use, no trauma hx, was BIB Promise picked up from [facility] attempting to board a plane to [place] without a ticket.     became agitated and in conflict with another peer (no physical contact) over a yarmulka and required PRNs. Pt is a limited historian and with prominent features of autism including repetitive movements, perseverative speech, socially with limited awareness. Also with possible psychotic symptoms though patient is guarded about hallucinations and with some delusional content expressed. Per reports, patient's father is in the process of remarrying and this change may have been the trigger for patient's decompensation. will f/u VPA levels today     no CO indicated  Risperdal 3mg BID  VPA 1250mg qhs   Clonidine 0.1mg TID   ongoing psychoed  coordinate with s/w  obtain collateral 
21yr old Taoist M, domiciled with father in [place], [place], part-time employed at [employer], psych hx of schizophrenia (as per psyckes) vs. Bipolar I Disorder (as per mother) , ASD, 30-40 past psych hospitalizations ([place] in [place]21) and follows up with a PACT team in [place], no substance use, no trauma hx, was MILTON Virgen picked up from [facility] attempting to board a plane to [place] without a ticket.     The patient continues to be disorganized, with provocative behavior on the unit, requiring prn medications.  He has been refusing po medications without good reason.  The patient continues to be agitated at times, possible influence of delusional thinking, but his poor reporting makes it difficult to assess.  The patient needs further hospitalization for safety and treatment, and will be converted to 9.27 status.  Will start process for treatment over objections.    no CO indicated  Continue to offer medications:  Risperdal 3mg BID  Depakote ER 1250mg qhs   Clonidine 0.1mg TID   ongoing psychoed  coordinate with s/w
21yr old Rastafarian M, domiciled with father in [place], [place], part-time employed at [employer], psych hx of schizophrenia (as per psyckes) vs. Bipolar I Disorder (as per mother) , ASD, 30-40 past psych hospitalizations ([place] in [place]21) and follows up with a PACT team in [place], no substance use, no trauma hx, was MILTON Virgen picked up from [facility] attempting to board a plane to [place] without a ticket.     continues to decline meds, does not appear to understand the need for meds / his psychiatric illness. Pt intrusive requiring STAT meds / psych emergencies during the weekend, jumping into nursing station yesterday. team pursuing TOO as pt lacks capacity to refuse meds at this time     no CO indicated  Continue to offer medications:  Risperdal 3mg BID  Depakote ER 1250mg qhs   Clonidine 0.1mg TID   ongoing psychoed  coordinate with s/w
21yr old Oriental orthodox M, domiciled with father in [place], [place], part-time employed at [employer], psych hx of schizophrenia (as per psyckes) vs. Bipolar I Disorder (as per mother) , ASD, 30-40 past psych hospitalizations ([place] in [place]21) and follows up with a PACT team in [place], no substance use, no trauma hx, was MILTON Virgen picked up from [facility] attempting to board a plane to [place] without a ticket.     intermittent compliance to court ordered meds and requiring IM administration of court ordered meds. poor impulses and frustration tolerance with behaviorally issues leading to now injuring himself on his L. toe s/p trying to jump into nursing station. Pt will be sent to ED for further eval / treatment.     no CO indicated  Continue to offer medications:  Invega Sustenna 234mg given 2/16/22  Risperdal 3mg BID  Depakote ER 1250mg qhs   Clonidine 0.1mg TID   ongoing psychoed  coordinate with s/w
21yr old Oriental orthodox M, domiciled with father in [place], [place], part-time employed at [employer], psych hx of schizophrenia (as per psyckes) vs. Bipolar I Disorder (as per mother) , ASD, 30-40 past psych hospitalizations ([place] in [place]21) and follows up with a PACT team in [place], no substance use, no trauma hx, was MILTON Virgen picked up from [facility] attempting to board a plane to [place] without a ticket.     intermittent adherence to meds over this interval, oddly related, perseverative. will c/w meds below and encourage adherence.     no CO indicated  Risperdal 3mg daily + 4mg qhs  VPA 1250mg qhs   Clonidine 0.1mg TID   ongoing psychoed  coordinate with s/w  
21yr old Amish M, domiciled with father in [place], [place], part-time employed at [employer], psych hx of schizophrenia (as per psyckes) vs. Bipolar I Disorder (as per mother) , ASD, 30-40 past psych hospitalizations ([place] in [place]) and follows up with a PACT team in [place], no substance use, no trauma hx, was MILTON Virgen picked up from [facility] attempting to board a plane to [email or web address] without a ticket.     The patient has been intermittently compliant with medications.  His thoughts are somewhat more organized after taking 3 doses of medication, but he refused again today.  Less provocative behavior after taking medications.  He admits to continued hallucinations and has delusional thinking.  For medications over objections.    no CO indicated  Continue to offer medications:  Risperdal 3mg BID  Depakote ER 1250mg qhs   Clonidine 0.1mg TID   ongoing psychoed  coordinate with s/w
21yr old Bahai M, domiciled with father in [place], [place], part-time employed at [employer], psych hx of schizophrenia (as per psyckes) vs. Bipolar I Disorder (as per mother) , ASD, 30-40 past psych hospitalizations ([place] recently [place] from [place] in 08[place]21) and follows up with a PACT team in [place], no substance use, no trauma hx, was BIB Promise picked up from [facility] attempting to board a plane to [place] without a ticket.     adherent to treatment, behaviorally tenuous and required IM meds yesterday as patient was becoming more provocative towards a peer. Remains perseverative about wanting his belongings back. Will aim to administer Invega Sustenna 156mg next week. Ongoing discussion about appropriate dispo     no CO indicated  Continue to offer medications:  Invega Sustenna 234mg given 2/16/22  Risperdal 3mg BID  Depakote ER 1250mg qhs   Clonidine 0.1mg TID   ongoing psychoed  coordinate with s/w
21yr old Religious M, domiciled with father in [place], [place], part-time employed at [employer], psych hx of schizophrenia (as per psyckes) vs. Bipolar I Disorder (as per mother) , ASD, 30-40 past psych hospitalizations ([place] from [place] in 08[place]21) and follows up with a PACT team in [place], no substance use, no trauma hx, was MILTON Virgen picked up from [facility] attempting to board a plane to [place] without a ticket.     remains disorganized with fluctuating levels of agitation, refusing meds. court granted TOO and patient given Invega Sustenna 234mg IM today. due to need for overlap with loading dose, will continue to offer court ordered PO Risperdal, and also offer Depakote (court ordered), and if patient refuses Risperdal, will activate court ordered Haldol.     no CO indicated  Continue to offer medications:  Invega Sustenna 234mg given 2/16/22  Risperdal 3mg BID  Depakote ER 1250mg qhs   Clonidine 0.1mg TID   ongoing psychoed  coordinate with s/w
21yr old Latter day M, domiciled with father in [place], [place], part-time employed at [employer], psych hx of schizophrenia (as per psyckes) vs. Bipolar I Disorder (as per mother) , ASD, 30-40 past psych hospitalizations ([place] in 08[place]2021) and follows up with a PACT team in [place], no substance use, no trauma hx, was BIB Promise picked up from [facility] attempting to board a plane to [place] without a ticket.     Patient denies AVH but appears responding to internal stimuli, his reality testing appears limited, as is his insight + judgement. Patient with prominent features of ASD + psychotic symptoms. Will increase Risperdal to 4mg qhs and keep AM dose @ 3mg.     no CO indicated  Risperdal 3mg daily + 4mg qhs  VPA 1250mg qhs   Clonidine 0.1mg TID   ongoing psychoed  coordinate with s/w  obtain collateral 
21yr old Mosque M, domiciled with father in [place], [place], part-time employed at [employer], psych hx of schizophrenia (as per psyckes) vs. Bipolar I Disorder (as per mother) , ASD, 30-40 past psych hospitalizations ([place] in 08[place]21) and follows up with a PACT team in [place], no substance use, no trauma hx, was MILTON Virgen picked up from [facility] attempting to board a plane to [place] without a ticket.     MSE with subtle improvement over this interval; pt adherent to meds this AM. Medication formulation changed to liquid and rapid disintegrate (depakote and Risperdal, respectively) to facilitate adherence     no CO indicated  Risperdal M-tab 3mg BID  Depakene 1250mg qhs   Clonidine 0.1mg TID   ongoing psychoed  coordinate with s/w  
21yr old Sabianist M, domiciled with father in [place], [place], part-time employed at [email or web address], psych hx of schizophrenia (as per psyckes) vs. Bipolar I Disorder (as per mother) , ASD, 30-40 past psych hospitalizations ([place] in 08[place]21) and follows up with a [place]CT team in [place], no substance use, no trauma hx, was BIB Promise picked up from [facility] attempting to board a plane to [place] without a ticket.     refusing meds over this interval, speech and TC perseverative and pt with limited insight appears more agitated this AM.     no CO indicated  Risperdal 3mg daily + 4mg qhs  VPA 1250mg qhs   Clonidine 0.1mg TID   ongoing psychoed  coordinate with s/w  
21yr old Sikh M, domiciled with father in [place], [place], part-time employed at [employer], psych hx of schizophrenia (as per psyckes) vs. Bipolar I Disorder (as per mother) , ASD, 30-40 past psych hospitalizations ([place] recently [place] in 08[place]21) and follows up with a PACT team in [place], no substance use, no trauma hx, was MILTON Virgne picked up from [facility] attempting to board a plane to [place] without a ticket.     interview limited due to perseverative behavior / speech and patient with long pauses before responding. initially declined meds this AM, stating "no good reason" for why he declined and then requested nursing to give him AM meds. Patient with behavioral disorganization but without overt hallucinations / behavior indicative of hallucinations, not endorsing delusions. Contributing to his presentation is likely his ASD and schizophrenia though difficult to delineate     no CO indicated  Risperdal 3mg BID  Depakote ER 1250mg qhs   Clonidine 0.1mg TID   ongoing psychoed  coordinate with s/w  
21yr old Sikhism M, domiciled with father in [place], [place], part-time employed at [employer], psych hx of schizophrenia (as per psyckes) vs. Bipolar I Disorder (as per mother) , ASD, 30-40 past psych hospitalizations ([place] in 08[place]2021) and follows up with a PACT team in [place], no substance use, no trauma hx, was MILTON Virgen picked up from [facility] attempting to board a plane to [place] without a ticket.     The patient continues to be disorganized, with provocative behavior on the unit, requiring prn medications.  He has been refusing po medications without good reason.  The patient continues to be agitated at times, possible influence of delusional thinking, but his poor reporting makes it difficult to assess.    no CO indicated  Continue to offer medications:  Risperdal 3mg BID  Depakote ER 1250mg qhs   Clonidine 0.1mg TID   ongoing psychoed  coordinate with s/w

## 2022-02-22 NOTE — BH CONSULTATION LIAISON ASSESSMENT NOTE - CASE SUMMARY
Chart reviewed, pt. seen/evaluated on 2/23, I agree with above assessment/plan, pt. oriented, cooperative, seems limited with concrete thought process, when  asked about his [facility] admission, he states that he was admitted to [facility] as he was trying to fly out of [facility] to go to [place] to meet his two wives (+delusions), pt. was not able to elaborate further, he reports vague auditory and visual hallucinations, stating  " I see visions about future, I know about future" . Patient again was not able to provide further details. Pt. firmly denies suicidal ideations, denies homicidal ideations. Plan as above, will need to be transfer back to [facility] once medically optimized, will follow

## 2022-02-22 NOTE — CHART NOTE - NSCHARTNOTEFT_GEN_A_CORE
[facility] Inpatient to ED Transfer Summary    Reason for Transfer/Medical Summary:    23 yo male, Schizoaffective disorder, Autism, being transferred to ED s/p behavioral episode where patient hopped into nursing station, fell awkwardly on his toes and now has swollen L. big toe with laceration and difficulty ambulating     PAST MEDICAL & SURGICAL HISTORY:  Schizoaffective disorder  Autism         Allergies    Allergy Status Unknown    Intolerances        MEDICATIONS  (STANDING):  cloNIDine 0.1 milliGRAM(s) Oral three times a day  diVALproex ER 1250 milliGRAM(s) Oral at bedtime  LORazepam     Tablet 1 milliGRAM(s) Oral at bedtime  risperiDONE   Tablet 1 milliGRAM(s) Oral two times a day    MEDICATIONS  (PRN):  aluminum hydroxide/magnesium hydroxide/simethicone Suspension 30 milliLiter(s) Oral every 6 hours PRN Dyspepsia  diphenhydrAMINE 50 milliGRAM(s) Oral every 6 hours PRN agitation  diphenhydrAMINE Injectable 50 milliGRAM(s) IntraMuscular once PRN agitation  haloperidol     Tablet 5 milliGRAM(s) Oral every 4 hours PRN agitation  haloperidol    Injectable 5 milliGRAM(s) IntraMuscular once PRN agitation / patient refusal of court ordered medications  LORazepam     Tablet 2 milliGRAM(s) Oral every 4 hours PRN agitation  LORazepam   Injectable 2 milliGRAM(s) IntraMuscular once PRN agitation  nicotine  Polacrilex Gum 2 milliGRAM(s) Oral every 4 hours PRN nrt  prochlorperazine   Injectable 10 milliGRAM(s) IntraMuscular once PRN nausea  prochlorperazine   Tablet 10 milliGRAM(s) Oral every 8 hours PRN nausea      Vital Signs Last 24 Hrs  T(C): 36.4 (22 Feb 2022 08:01), Max: 36.9 (21 Feb 2022 17:26)  T(F): 97.5 (22 Feb 2022 08:01), Max: 98.5 (21 Feb 2022 17:26)  HR: --  BP: --  BP(mean): --  RR: --  SpO2: --  CAPILLARY BLOOD GLUCOSE            PHYSICAL EXAM:  GENERAL: NAD  HEAD:  Atraumatic, Normocephalic  EXTREMITIES:  L. big toe swollen with laceration   PSYCH: AAOx3  NEUROLOGY: non-focal  SKIN: No rashes or lesions    LABS:                    Psychiatry Section:  Psychiatric Summary/[facility] admitting diagnosis: Schizoaffective disorder, Autism     Psychiatric Recommendations:  work up for lower extremity swelling / treatment as clinically necessary     Observation status (check one):   (x ) Constant Observation  ( ) Enhanced care  ( ) Routine checks    Risk Status (check all that apply if present):  ( ) at risk for suicide/self-injury  (x ) at risk for aggressive behavior  ( x) at risk for elopement  ( ) other risk:

## 2022-02-22 NOTE — BH INPATIENT PSYCHIATRY PROGRESS NOTE - NSTXDISORGINTERMD_PSY_ALL_CORE
meds + therapy

## 2022-02-22 NOTE — CONSULT NOTE ADULT - SUBJECTIVE AND OBJECTIVE BOX
Podiatry pager #: [phone]/ [phone]    Patient is a 22y old  Male who presents with a chief complaint of L foot hallux open fracture    HPI: 21 yo M with schizoaffective d/o that was transferred from [facility] for left foot wound. Per pt, he was at [facility] last night, fell and had left toe pain. Per note from [facility] and staff, pt tried to jump over nursing station last night, sustained a laceration and was sent for eval. Pt states he has pain at great toe of left foot. No other injuries reported. No head trauma, no LOC.      PAST MEDICAL & SURGICAL HISTORY:  Bipolar disorder    Schizoaffective disorder        MEDICATIONS  (STANDING):  sodium chloride 0.9% Bolus 1000 milliLiter(s) IV Bolus once    MEDICATIONS  (PRN):      Allergies    Allergy Status Unknown    Intolerances        VITALS:    Vital Signs Last 24 Hrs  T(C): 36.2 (22 Feb 2022 12:45), Max: 36.9 (21 Feb 2022 17:26)  T(F): 97.1 (22 Feb 2022 12:45), Max: 98.5 (21 Feb 2022 17:26)  HR: 108 (22 Feb 2022 12:45) (108 - 108)  BP: 98/66 (22 Feb 2022 12:45) (98/66 - 98/66)  BP(mean): --  RR: 16 (22 Feb 2022 12:45) (16 - 16)  SpO2: 99% (22 Feb 2022 12:45) (99% - 99%)    LABS:                CAPILLARY BLOOD GLUCOSE              LOWER EXTREMITY PHYSICAL EXAM:    Vascular: DP/PT 2/4, B/L, CFT <3 seconds B/L, Temperature gradient warm to cool, B/L.   Neuro: Epicritic sensation intact to the level of digits, B/L.  Musculoskeletal/Ortho: L foot hallux with proximal phalanx dislocation and break in skin. Intact ROM to the L foot hallux in plantarflexion and dorsiflexion.   Skin: L foot hallux with exposed proximal phalanx head with 3cm skin defect, no active bleeding no periwound erythema, no tendon exposed, no acute signs of infection. R foot with no open wounds or clinical signs of infection.      RADIOLOGY & ADDITIONAL STUDIES:    Xrays pending

## 2022-02-22 NOTE — BH INPATIENT PSYCHIATRY PROGRESS NOTE - NSTXMEDICDATEEST_PSY_ALL_CORE
26-Jan-2022
27-Jan-2022
26-Jan-2022
26-Jan-2022
16-Feb-2022
16-Feb-2022
27-Jan-2022
26-Jan-2022
27-Jan-2022
26-Jan-2022
27-Jan-2022
26-Jan-2022

## 2022-02-22 NOTE — BH INPATIENT PSYCHIATRY PROGRESS NOTE - NSTXDISORGPROGRES_PSY_ALL_CORE
Improving
No Change
Improving
Improving
No Change
Improving
Improving
No Change
Improving

## 2022-02-22 NOTE — H&P ADULT - NSHPREVIEWOFSYSTEMS_GEN_ALL_CORE
CONSTITUTIONAL: No fever; No chills; No night sweats; No weight loss; No fatigue  EYES: No eye pain; No blurry vision  ENMT:  No difficulty hearing; No sinus or throat pain  NECK: No pain or stiffness  RESPIRATORY: No cough; No wheezing; No hemoptysis; No shortness of breath; No sputum production  CARDIOVASCULAR: No chest pain; No palpitations; No leg swelling  GASTROINTESTINAL: No abdominal pain; No nausea; No vomiting; No hematemesis; No diarrhea; No constipation. No melena or hematochezia.  GENITOURINARY: No dysuria; No frequency; No hematuria; No incontinence  NEUROLOGICAL: No headaches; No loss of strength; No numbness  SKIN: No itching/burning; No rashes or lesions   MUSCULOSKELETAL: see above  HEME/LYMPH: No easy bruising, or bleeding gums

## 2022-02-22 NOTE — ED PROVIDER NOTE - CLINICAL SUMMARY MEDICAL DECISION MAKING FREE TEXT BOX
21 yo M with schizoaffective d/o that presents with left great toe open fracture s/p fall found to have open fracture of distal phalynx. Will obtain preop labs, imaging, provide pain meds, consult podiatry and likely admit for IV ABx.

## 2022-02-22 NOTE — BH INPATIENT PSYCHIATRY DISCHARGE NOTE - HOSPITAL COURSE
Patient presented to the hospital with disorganization after being found at [facility] where he attempted to buy a ticket to [employer]. On presentation to the unit, patient was a limited historian. initial MSE notable for disorganization, perseveration, and with limited insight judgement and impulse control. His home medications were restarted. He was initially intermittently compliant. He required several STAT / PRN meds over the course of hospitalization due to non-redirectable agitation. He was gien a diagnosis of ASD and components of his behavior may align with this - his repetitive / perseverative speech and behavior, poor impulses, limited frustration tolerance, though he also exhibited psychotic symptoms including delusions and hallucinations, and behavioral + thought disorganization. Patient with numerous episodes of agitation, unable to follow staff direction, jumping into nursing station, destroying property and banging on walls / window. Pt following female peer around trying to go into their room and closing the door. Due to inconsistency in med adherence, alongside active psychotic symptoms, hospital pursued TOO which was granted. Patient ordered for and received Invega Sustenna 234mg IM on 2/16/22. Patient remained intermittently compliant with medications, requiring administration of IM court ordered medications. Patient was in tenuous behavioral control with poor frustration tolerance, and limited impulse control. On 2/21/22, patient became frustrated, attempted to hop into nursing station, fell on his toes. On the morning of 2/22/22, patient with complaints of L. toe pain, and on exam, toe appeared swollen with a healing laceration. Patient sent to ED, found to have open fracture that was manually reduced by podiatry and admitted medically for IV antibiotics.

## 2022-02-22 NOTE — BH INPATIENT PSYCHIATRY PROGRESS NOTE - NSTXDCOPNODATETRGT_PSY_ALL_CORE
17-Feb-2022
24-Feb-2022
04-Feb-2022
28-Feb-2022
04-Feb-2022
04-Feb-2022
24-Feb-2022
04-Feb-2022
17-Feb-2022
04-Feb-2022
04-Feb-2022
17-Feb-2022
24-Feb-2022
17-Feb-2022
04-Feb-2022
04-Feb-2022
17-Feb-2022
28-Feb-2022
17-Feb-2022

## 2022-02-22 NOTE — BH INPATIENT PSYCHIATRY PROGRESS NOTE - NSBHCONSDANGERSELF_PSY_A_CORE
unable to care for self

## 2022-02-22 NOTE — BH INPATIENT PSYCHIATRY PROGRESS NOTE - NSICDXBHSECONDARYDX_PSY_ALL_CORE
Autism   F84.0  

## 2022-02-22 NOTE — BH INPATIENT PSYCHIATRY PROGRESS NOTE - NSBHCHARTREVIEWVS_PSY_A_CORE FT
Vital Signs Last 24 Hrs  T(C): 36.4 (02-22-22 @ 08:01), Max: 36.9 (02-21-22 @ 17:26)  T(F): 97.5 (02-22-22 @ 08:01), Max: 98.5 (02-21-22 @ 17:26)  HR: --  BP: --  BP(mean): --  RR: --  SpO2: --    Orthostatic VS  02-22-22 @ 08:01  Lying BP: --/-- HR: --  Sitting BP: 137/68 HR: 89  Standing BP: --/-- HR: --  Site: --  Mode: --  Orthostatic VS  02-21-22 @ 09:00  Lying BP: --/-- HR: --  Sitting BP: 99/66 HR: 93  Standing BP: --/-- HR: --  Site: --  Mode: --  Orthostatic VS  02-20-22 @ 21:05  Lying BP: --/-- HR: --  Sitting BP: 117/80 HR: 87  Standing BP: --/-- HR: --  Site: --  Mode: --   Vital Signs Last 24 Hrs  T(C): 36.4 (02-22-22 @ 16:25), Max: 36.9 (02-21-22 @ 17:26)  T(F): 97.6 (02-22-22 @ 16:25), Max: 98.5 (02-21-22 @ 17:26)  HR: 103 (02-22-22 @ 16:25) (103 - 108)  BP: 121/100 (02-22-22 @ 16:25) (98/66 - 121/100)  BP(mean): --  RR: 18 (02-22-22 @ 16:25) (16 - 18)  SpO2: 100% (02-22-22 @ 16:25) (99% - 100%)    Orthostatic VS  02-22-22 @ 08:01  Lying BP: --/-- HR: --  Sitting BP: 137/68 HR: 89  Standing BP: --/-- HR: --  Site: --  Mode: --  Orthostatic VS  02-21-22 @ 09:00  Lying BP: --/-- HR: --  Sitting BP: 99/66 HR: 93  Standing BP: --/-- HR: --  Site: --  Mode: --  Orthostatic VS  02-20-22 @ 21:05  Lying BP: --/-- HR: --  Sitting BP: 117/80 HR: 87  Standing BP: --/-- HR: --  Site: --  Mode: --

## 2022-02-22 NOTE — BH CONSULTATION LIAISON ASSESSMENT NOTE - OTHER
denies at this time, but did report recent AH concrete does not discuss delusions at this time of interview

## 2022-02-22 NOTE — BH CONSULTATION LIAISON ASSESSMENT NOTE - SUMMARY
Patient is a 21 yo male no significant PMHX. PPHx of Autism, schizoaffective disorder. Patient coming from [facility], however he resides in [place]. As per chart review, patient reportedly has 30-40 hospitalizations. follows up with a PACT team in [place], no substance use, no trauma hx. This past [facility] admission January 2022, patient was brought in after attempting to fly out of [facility] to go to [place] to meet his two wives ( + delusions) without a plane ticket.  At that time patient was also with + AH, hearing voices telling him to fly to [place]. During his time at [facility], patient was sent to court for treatment over objection which was granted with meds that included Risperdal PO, Invega Sustenna, Depakote, Ativan, Cogentin. Invega Sustenna 234mg was given to patient on 2/16/22. Other meds he’s currently receiving includes Risperdal 1mg BID, Depakote 1250mg BID, Lorazepam 1mg qhs.    Patient sent to the ED  due to L. Big toe pain + swelling + laceration.  patient attempted to jump into nursing station, fell awkwardly on his toe.  ED consulted podiatry and they manually reduced the area and recommended IV antibiotics.    PLAN  - obtain EKG : antipsychotics can only be given if qtc < 500   - continue [facility] medication regime  --- Invega Sustenna 234mg was given to patient on 2/16/22.  Risperdal 1mg BID, Depakote 1250mg BID, Lorazepam 1mg qhs.    ---- please obtain labs: VPA level in am  ----  monitor LFTS, ammonia and platelets as patient on Depakote   - PRN for agitation if qtc < 500, haldol 5mg q6hrs .  can given with ativan 2mg q6hrs for severe agitation  - Remain on CO for impulsive behavior  Patient is a 21 yo male no significant PMHX. PPHx of Autism, schizoaffective disorder. Patient coming from [facility], however he resides in [place]. As per chart review, patient reportedly has 30-40 hospitalizations. follows up with a PACT team in [place], no substance use, no trauma hx. This past [facility] admission January 2022, patient was brought in after attempting to fly out of [facility] to go to [place] to meet his two wives ( + delusions) without a plane ticket.  At that time patient was also with + AH, hearing voices telling him to fly to [place]. During his time at [facility], patient was sent to court for treatment over objection which was granted with meds that included Risperdal PO, Invega Sustenna, Depakote, Ativan, Cogentin. Invega Sustenna 234mg was given to patient on 2/16/22. Other meds he’s currently receiving includes Risperdal 1mg BID, Depakote 1250mg qhs,  Lorazepam 1mg qhs.    Patient sent to the ED  due to L. Big toe pain + swelling + laceration.  patient attempted to jump into nursing station, fell awkwardly on his toe.  ED consulted podiatry and they manually reduced the area and recommended IV antibiotics.    PLAN  - obtain EKG : antipsychotics can only be given if qtc < 500   - continue [facility] medication regime  --- Invega Sustenna 234mg was given to patient on 2/16/22.  Risperdal 1mg BID, Depakote 1250mg qhs, Lorazepam 1mg qhs.    ---- please obtain labs: VPA level in am, VPA level 52.30  ----  monitor LFTS, ammonia and platelets as patient on Depakote   - PRN for agitation if qtc < 500, haldol 5mg q6hrs PO/IM/IV,  can given with ativan 2mg Po/IM/IV q6hrs for severe agitation  - Remain on CO for impulsive behavior   Dispo: Pt. will be transferred back to [facility] once medically optimized

## 2022-02-22 NOTE — BH INPATIENT PSYCHIATRY PROGRESS NOTE - NSBHATTESTSEENBY_PSY_A_CORE
attending Psychiatrist without NP/Trainee

## 2022-02-22 NOTE — H&P ADULT - PROBLEM SELECTOR PLAN 1
- Admit to medicine  - Open left hallux fracture s/p reduction and wound dressed and cleaned by Podiatry  - Appreciate Podiatry recommendiations; Ancef 1g BID x7 days ----> if able to d/c then can complete one week of antibiotics with Keflex.   -  Weight bearing as tolerated to  left foot heel in surgical shoe.   - Patient is to follow up with Dr. Waterhouse within 1 week of discharge at [facility] [phone] Statement Selected

## 2022-02-22 NOTE — BH INPATIENT PSYCHIATRY DISCHARGE NOTE - HPI (INCLUDE ILLNESS QUALITY, SEVERITY, DURATION, TIMING, CONTEXT, MODIFYING FACTORS, ASSOCIATED SIGNS AND SYMPTOMS)
21yr old Holiness M, domiciled with father in [place], [place], part-time employed at [employer], psych hx of schizophrenia (as per psyckes) vs. Bipolar I Disorder (as per mother) , ASD, 30-40 past psych hospitalizations (most recently discharged from [place] in 08/2021) and follows up with a PACT team in [place], no substance use, no trauma hx, was BIB [facility] picked up from [facility] attempting to board a plane to [place] without a ticket.     Patient is a limited historian and with limited reliability. Patient reports he wanted to go to [facility] to fly to [place] so he can meet his friends. States that he did not have a plane ticket, but that he was intending to purchase one at the airport before [facility] team approached him. He states he doesn't like [employer] and they often convince him to go to the hospital which he wants to avoid. States that he doesn't need medications and that he'd prefer to off medications so the primary team can observe how he does without meds. PSychoed provided to patient. He reports stable mood, denies low energy, concentration, denies AVH, denies SI and HI. He requests for nicotine gum for NRT. Denies racing thoughts. Expresses ambivalence with regard to medications.     Per ED note:     Patient initially interviewed in intake area where he refused staff to take his belongings or perform his vitals. Ultimately he complied, did not require meds and then was interviewed in  Hallways.     Patient is a poor historian, extremely concrete in TP, with significant perseveration of speech.   He states that he went to the airport today to spontaneously buy a ticket to go to [place] to surprise his brother. He states he was also planning on moving there but does not provide details on why. He also said that his mom was very nervous because he was nervous and he doesn't know why. Patient explained that his 2 fiancees who he met at [facility] were going to meet him at [facility]. He identifies them as [employer] and [employer] and when asked if they had real names, he was reluctant to provide them but then stated their names. He says that he has met them twice before and he is in love with them. When asked if he's provided them with money, he smiled to himself and stated he provided them with something "very expensive that was like a ring". He states that earlier in the day he was at home and then he "instinctively heard their voice" that told him to pack his bags and meet them at the airport so he packed his bag, took a bus and a taxi to [facility].     When asked about medication, he states he takes his pills, pulls out a weeks pill sleeve but does not know the name of his medication. He then continuously repeats "I have a Pact Team". He reports he's been taking his medication and they've made him very confused and he is more clear without them however as per mom, patient has been non-adherent with medications for approximately two weeks. He is unable to name the medication.     Patient denies any hallucinations, denies thought insertion/withdrawal, denies referential thought processes & is not paranoid on interview. It Is unclear if these two fiances are real people or delusional. Patient does not report nor exhibit any signs of dana, including irritable or elevated mood, grandiosity, pressured speech, increase in productivity or agitation. He reports he's been sleeping Patient denies any depressive symptoms including depressed mood, anhedonia, changes in energy/concentration/appetite, sleep disturbances, preoccupation with death or feelings of guilt. Patient denies SI, intent or plan; denies any HI, violent thoughts.    Collateral from Mother in  Note.     PACT Team in [place] ([place] is a PACT contact, phone number is office: [phone] ext. [phone], emergency line: [phone]) - Patient since 10/2021. At baseline patient is on the spectrum, he's limited as well. The team has been watching him taking his morning medication 3 days a week and his father watches him take his medications the rest of the time. Father is perceived to be limited. Shivam was last seen on 01/24/2022 and was clean, appropriate and calm. Restricted affect, normal speech. Father is usually intrusive when team is present. 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 Typical of patient to exhibit perseverative speech "I'll be like, I'll be like, I'll be like". Firefox and [employer] are two delusional figments. In the past he has left the house to meet these girls impulsively and got readmitted to the hospital. Concern that patient is abusing alcohol. Given diagnosis of Schizoaffective disorder, bipolar type. In the past has been suicidal but not recently.   Given the fact that he traveled across state lines, PACT team feels he's an acute danger and most likely should be rehospitalized.   Meds: Depakote 1500mg po qd, risperdal 3mg po bid, clonidine hcl 0.1mg po tid and ativan 1mg po qhs

## 2022-02-22 NOTE — BH CONSULTATION LIAISON ASSESSMENT NOTE - RISK ASSESSMENT
Risk factors: age, sex, chronic frustration intolerance, psychiatric diagnosis, substance use hx?  protective factors: no hx of suicide attempts, currently not endorsing harm to self or others, support from family, meds over objection

## 2022-02-22 NOTE — BH INPATIENT PSYCHIATRY PROGRESS NOTE - NSTXDCSOCDATEEST_PSY_ALL_CORE
27-Jan-2022
27-Jan-2022
16-Feb-2022
27-Jan-2022
27-Jan-2022
16-Feb-2022
27-Jan-2022
16-Feb-2022
27-Jan-2022

## 2022-02-22 NOTE — BH INPATIENT PSYCHIATRY PROGRESS NOTE - NSTXDISORGDATEEST_PSY_ALL_CORE
27-Jan-2022
26-Jan-2022
27-Jan-2022
16-Feb-2022
27-Jan-2022
16-Feb-2022
27-Jan-2022
16-Feb-2022
27-Jan-2022
27-Jan-2022

## 2022-02-22 NOTE — BH INPATIENT PSYCHIATRY PROGRESS NOTE - NSBHCONSULTIPREASON_PSY_A_CORE
danger to self; mental illness expected to respond to inpatient care

## 2022-02-22 NOTE — ED PROVIDER NOTE - PROGRESS NOTE DETAILS
Pt has open fracture of left hallux. Podiatry saw pt in ED (see note for details). They recommended IVAbx followed up PO Abx and pt is s/p reduction and sutures of his foot by podiatry.    Spoke to hospitalist at [facility] where pt was sent from, they are aware pt has limitations now due to findings but report that pt cannot be sent back to [facility] without a PT eval to see if this pt can use a walker. They stated that pt with a surgical shoe and ACE wrap can be sent back but will require a 1:1 as those items can be used as a weapon.   At this time will admit to hospitalist for PT eval for walker use and IVAbx.\  No need for covid swab at this time as pt had NEG covid testing 2 days ago and was inter-institutional transfer.

## 2022-02-23 DIAGNOSIS — F84.0 AUTISTIC DISORDER: ICD-10-CM

## 2022-02-23 LAB
ANION GAP SERPL CALC-SCNC: 11 MMOL/L — SIGNIFICANT CHANGE UP (ref 7–14)
BUN SERPL-MCNC: 10 MG/DL — SIGNIFICANT CHANGE UP (ref 7–23)
CALCIUM SERPL-MCNC: 8.9 MG/DL — SIGNIFICANT CHANGE UP (ref 8.4–10.5)
CHLORIDE SERPL-SCNC: 105 MMOL/L — SIGNIFICANT CHANGE UP (ref 98–107)
CO2 SERPL-SCNC: 23 MMOL/L — SIGNIFICANT CHANGE UP (ref 22–31)
CREAT SERPL-MCNC: 0.78 MG/DL — SIGNIFICANT CHANGE UP (ref 0.5–1.3)
GLUCOSE SERPL-MCNC: 89 MG/DL — SIGNIFICANT CHANGE UP (ref 70–99)
HCT VFR BLD CALC: 40.3 % — SIGNIFICANT CHANGE UP (ref 39–50)
HGB BLD-MCNC: 13.5 G/DL — SIGNIFICANT CHANGE UP (ref 13–17)
MAGNESIUM SERPL-MCNC: 2 MG/DL — SIGNIFICANT CHANGE UP (ref 1.6–2.6)
MCHC RBC-ENTMCNC: 30.3 PG — SIGNIFICANT CHANGE UP (ref 27–34)
MCHC RBC-ENTMCNC: 33.5 GM/DL — SIGNIFICANT CHANGE UP (ref 32–36)
MCV RBC AUTO: 90.4 FL — SIGNIFICANT CHANGE UP (ref 80–100)
NRBC # BLD: 0 /100 WBCS — SIGNIFICANT CHANGE UP
NRBC # FLD: 0 K/UL — SIGNIFICANT CHANGE UP
PHOSPHATE SERPL-MCNC: 3.7 MG/DL — SIGNIFICANT CHANGE UP (ref 2.5–4.5)
PLATELET # BLD AUTO: 212 K/UL — SIGNIFICANT CHANGE UP (ref 150–400)
POTASSIUM SERPL-MCNC: 4.4 MMOL/L — SIGNIFICANT CHANGE UP (ref 3.5–5.3)
POTASSIUM SERPL-SCNC: 4.4 MMOL/L — SIGNIFICANT CHANGE UP (ref 3.5–5.3)
RBC # BLD: 4.46 M/UL — SIGNIFICANT CHANGE UP (ref 4.2–5.8)
RBC # FLD: 12.7 % — SIGNIFICANT CHANGE UP (ref 10.3–14.5)
SODIUM SERPL-SCNC: 139 MMOL/L — SIGNIFICANT CHANGE UP (ref 135–145)
VALPROATE SERPL-MCNC: 52.3 UG/ML — SIGNIFICANT CHANGE UP (ref 50–100)
WBC # BLD: 7.41 K/UL — SIGNIFICANT CHANGE UP (ref 3.8–10.5)
WBC # FLD AUTO: 7.41 K/UL — SIGNIFICANT CHANGE UP (ref 3.8–10.5)

## 2022-02-23 PROCEDURE — 99232 SBSQ HOSP IP/OBS MODERATE 35: CPT

## 2022-02-23 PROCEDURE — 99223 1ST HOSP IP/OBS HIGH 75: CPT

## 2022-02-23 RX ORDER — HALOPERIDOL DECANOATE 100 MG/ML
5 INJECTION INTRAMUSCULAR EVERY 6 HOURS
Refills: 0 | Status: DISCONTINUED | OUTPATIENT
Start: 2022-02-23 | End: 2022-02-25

## 2022-02-23 RX ADMIN — DIVALPROEX SODIUM 1250 MILLIGRAM(S): 500 TABLET, DELAYED RELEASE ORAL at 22:02

## 2022-02-23 RX ADMIN — Medication 0.1 MILLIGRAM(S): at 13:39

## 2022-02-23 RX ADMIN — Medication 0.1 MILLIGRAM(S): at 10:11

## 2022-02-23 RX ADMIN — Medication 100 MILLIGRAM(S): at 02:33

## 2022-02-23 RX ADMIN — RISPERIDONE 1 MILLIGRAM(S): 4 TABLET ORAL at 05:34

## 2022-02-23 RX ADMIN — Medication 1 MILLIGRAM(S): at 22:02

## 2022-02-23 RX ADMIN — Medication 100 MILLIGRAM(S): at 13:39

## 2022-02-23 NOTE — PHYSICAL THERAPY INITIAL EVALUATION ADULT - PATIENT PROFILE REVIEW, REHAB EVAL
No Formal Activity Order in the Computer; spoke with ED SKYLA Navarrete prior to PT evaluation-->Pt OK for PT consult/OOB activity./yes

## 2022-02-23 NOTE — PHYSICAL THERAPY INITIAL EVALUATION ADULT - PERTINENT HX OF CURRENT PROBLEM, REHAB EVAL
23 yo M with schizoaffective d/o and autism that was transferred from [facility] for open fracture of left hallux.

## 2022-02-23 NOTE — PHYSICAL THERAPY INITIAL EVALUATION ADULT - DIAGNOSIS, PT EVAL
Pt admitted for fall; +open fracture of left hallux; Pt presents with decreased strength and decreased balance.

## 2022-02-23 NOTE — PHYSICAL THERAPY INITIAL EVALUATION ADULT - ADDITIONAL COMMENTS
Pt is a poor  historian; information regarding pt's prior living situation needs to be obtained; no care coordinator note at this time. As per H and P report pt is from [facility]. As per pt he was completely independent prior to hospital admission and used no assistive device. Pt denies any recent falls other than the fall that brought him in.    Pt left comfortable on stretcher, NAD, all lines intact, all precautions maintained, with call bell in reach, on constant observation, and RN aware.

## 2022-02-23 NOTE — BH DISCHARGE NOTE NURSING/SOCIAL WORK/PSYCH REHAB - NSDCPRRECOMMEND_PSY_ALL_CORE
Upon patient being deemed medically stable, psych rehab recommends patient continues inpatient psychiatric stay for ongoing medication management, support and psychotherapy.

## 2022-02-23 NOTE — PATIENT PROFILE ADULT - NSTRANSFERBELONGINGSDISPO_GEN_A_NUR
Drink plenty of water.  Use over the counter Ibuprofen or Tylenol as needed for pain/fever.  Take meds as prescribed.  Return for uncontrolled vomiting.  
Taken from patient, in closet

## 2022-02-23 NOTE — PATIENT PROFILE ADULT - FALL HARM RISK - HARM RISK INTERVENTIONS

## 2022-02-23 NOTE — BH DISCHARGE NOTE NURSING/SOCIAL WORK/PSYCH REHAB - NSCDUDCCRISIS_PSY_A_CORE
[facility] Well  1 [phone]) [facility]-WELL ([phone])  Text "WELL" to [phone]  Website: www.[employer]/.[employer] 1 (389) 051-KNSV (6279) Website: www.safehorizon.org/.National Suicide Prevention Lifeline 4 (463) 391-0201/.  [employer]  1 (482) LIFENET (552-5954)/.  [facility]’s [facility]  [address] [place] [phone]   Hours:  Monday through Friday from 9 AM to 3 PM/.  [place] Dept of  Affairs - Veterans Crisis Line  5 [phone], Option 1

## 2022-02-23 NOTE — BH DISCHARGE NOTE NURSING/SOCIAL WORK/PSYCH REHAB - PATIENT PORTAL LINK FT
You can access the FollowMyHealth Patient Portal offered by [facility] by registering at the following website: [email or web address]. By joining [email or web address]’s FollowMyHealth portal, you will also be able to view your health information using other applications (apps) compatible with our system.

## 2022-02-23 NOTE — PATIENT PROFILE ADULT - LIVING ENVIRONMENT
Comprehensive Intake Entered On:  6/28/2021 5:30 PM CDT    Performed On:  6/28/2021 5:27 PM CDT by Nataliia Adkins LPN               Summary   Chief Complaint :   requesting a letter to approve an emotional support animal, a cat, for her apartment    Weight Measured :   161.8 lb(Converted to: 161 lb 13 oz, 73.391 kg)    Height Measured :   65.5 in(Converted to: 5 ft 5 in, 166.37 cm)    Body Mass Index :   26.51 kg/m2 (HI)    Body Surface Area :   1.84 m2   Systolic Blood Pressure :   108 mmHg   Diastolic Blood Pressure :   64 mmHg   Mean Arterial Pressure :   79 mmHg   Peripheral Pulse Rate :   82 bpm   BP Site :   Right arm   BP Method :   Manual   Temperature Tympanic :   97.4 DegF(Converted to: 36.3 DegC)  (LOW)    Oxygen Saturation :   98 %   Nataliia Adkins LPN - 6/28/2021 5:27 PM CDT   Health Status   Allergies Verified? :   Yes   Medication History Verified? :   Yes   Medical History Verified? :   No   Pre-Visit Planning Status :   Completed   Tobacco Use? :   Never smoker   Nataliia Adkins LPN - 6/28/2021 5:27 PM CDT   Meds / Allergies   (As Of: 6/28/2021 5:30:46 PM CDT)   Allergies (Active)   No known allergies  Estimated Onset Date:   Unspecified ; Created By:   Nataliia Adkins LPN; Reaction Status:   Active ; Category:   Drug ; Substance:   No known allergies ; Type:   Allergy ; Updated By:   Nataliia Adkins LPN; Reviewed Date:   2/25/2020 12:26 PM CST      No Known Medication Allergies  Estimated Onset Date:   Unspecified ; Created By:   Bebe Villegas; Reaction Status:   Active ; Category:   Drug ; Substance:   No Known Medication Allergies ; Type:   Allergy ; Updated By:   Bebe Villegas; Reviewed Date:   7/24/2019 8:30 AM CDT        Medication List   (As Of: 6/28/2021 5:30:46 PM CDT)   Prescription/Discharge Order    escitalopram  :   escitalopram ; Status:   Prescribed ; Ordered As Mnemonic:   escitalopram 20 mg oral tablet ; Simple Display Line:   1 tab(s), Oral, daily, 90 tab(s), 3  Refill(s) ; Ordering Provider:   Antonia Hunt; Catalog Code:   escitalopram ; Order Dt/Tm:   10/6/2020 7:42:04 AM CDT            Home Meds    fluticasone  :   fluticasone ; Status:   Documented ; Ordered As Mnemonic:   Flovent HFA ; Simple Display Line:   inh, bid, 0 Refill(s) ; Catalog Code:   fluticasone ; Order Dt/Tm:   4/11/2018 1:30:47 PM CDT          albuterol  :   albuterol ; Status:   Documented ; Ordered As Mnemonic:   albuterol ; Simple Display Line:   0 Refill(s) ; Catalog Code:   albuterol ; Order Dt/Tm:   4/11/2018 1:30:27 PM CDT            Social History   Social History   (As Of: 6/28/2021 5:30:46 PM CDT)   Alcohol:        Never   (Last Updated: 4/14/2018 1:04:23 PM CDT by Rosa Ham)          Tobacco:        Never (less than 100 in lifetime)   (Last Updated: 6/28/2021 5:28:54 PM CDT by Nataliia Adkins LPN)          Electronic Cigarette/Vaping:        Electronic Cigarette Use: Never.   (Last Updated: 6/28/2021 5:28:57 PM CDT by Nataliia Adkins LPN)          Substance Abuse:        Never   (Last Updated: 4/17/2018 11:08:07 AM CDT by Rosa Ham)          Nutrition/Health:        Type of diet: Regular.   (Last Updated: 4/17/2018 11:08:21 AM CDT by Rosa Ham)          Exercise:        Exercise frequency: 3-4 times/week.  Exercise type: Running.   (Last Updated: 4/17/2018 11:08:29 AM CDT by Rosa Ham)          Sexual:        Sexually active: No.  Identifies as female, Sexual orientation: Straight or heterosexual.   (Last Updated: 4/17/2018 11:08:40 AM CDT by Rosa Ham)   no

## 2022-02-23 NOTE — BH DISCHARGE NOTE NURSING/SOCIAL WORK/PSYCH REHAB - NSDCPRGOAL_PSY_ALL_CORE
Patient was discharged from [facility] 2N and medically admitted to [facility] due to a foot injury patient sustained when patient attempted to jump into nurses station. During patients admission, patient was minimally medication compliant with the encouragement of staff and received a medications over objection order on 2/15/22. Patient was frequently observed to demonstrate poor boundaries with peers and staff, which required frequent redirection. Patient intermittently attended psychiatric rehabilitation groups during hospitalization. Patient demonstrated difficulty tolerating group structure, which was evident by patient frequently requiring redirection and not being able to meaningfully engage in group discussions. Patient demonstrated poor ADL's throughout hospitalization. Due to the nature of patients discharge from [facility], patient was not given a Press Ganey to complete, as well as did not complete a Safety Plan.

## 2022-02-23 NOTE — SOCIAL WORK POST DISCHARGE FOLLOW UP NOTE - NSBHSWFOLLOWUP_PSY_ALL_CORE_FT
SW advised pt injured himself on unit, was transferred to [facility] ED for foot injury

## 2022-02-24 PROCEDURE — 99231 SBSQ HOSP IP/OBS SF/LOW 25: CPT

## 2022-02-24 RX ORDER — CEPHALEXIN 500 MG
500 CAPSULE ORAL
Refills: 0 | Status: DISCONTINUED | OUTPATIENT
Start: 2022-02-24 | End: 2022-02-25

## 2022-02-24 RX ADMIN — DIVALPROEX SODIUM 1250 MILLIGRAM(S): 500 TABLET, DELAYED RELEASE ORAL at 22:47

## 2022-02-24 RX ADMIN — Medication 500 MILLIGRAM(S): at 18:18

## 2022-02-24 RX ADMIN — Medication 500 MILLIGRAM(S): at 23:40

## 2022-02-24 RX ADMIN — Medication 100 MILLIGRAM(S): at 02:10

## 2022-02-24 RX ADMIN — RISPERIDONE 1 MILLIGRAM(S): 4 TABLET ORAL at 18:19

## 2022-02-24 RX ADMIN — Medication 0.1 MILLIGRAM(S): at 05:12

## 2022-02-24 RX ADMIN — Medication 0.1 MILLIGRAM(S): at 22:46

## 2022-02-24 RX ADMIN — Medication 500 MILLIGRAM(S): at 13:32

## 2022-02-24 RX ADMIN — Medication 1 MILLIGRAM(S): at 22:46

## 2022-02-24 RX ADMIN — RISPERIDONE 1 MILLIGRAM(S): 4 TABLET ORAL at 05:12

## 2022-02-24 NOTE — PROVIDER CONTACT NOTE (OTHER) - ASSESSMENT
Patient /63
Patient is A&Ox4, Patient deneis pain, chest pain, shortness of breath, nausea, vomiting or dizziness.

## 2022-02-24 NOTE — DISCHARGE NOTE PROVIDER - HOSPITAL COURSE
21 yo M with schizoaffective d/o and autism that was transferred from [facility] for open fracture of left hallux. 21 yo M with schizoaffective d/o and autism that was transferred from [facility] for open fracture of left hallux.  s/p reduction of L hallux fx  Treated w/Ancef changed to Keflex last day 3/4/2022  -  Weight bearing as tolerated to  left foot heel in surgical shoe.   - Patient is to follow up with Dr. Waterhouse within 1 week of discharge at [facility] [phone].  Pt has been evaluated by psych rec to c/w  Invega Sustenna 234mg was given to patient on 2/16/22.  Risperdal 1mg BID, Depakote 1250mg qhs, Lorazepam 1mg qhs.          Pt is optimized to return back to [facility]. Dr Sina COLLADO medical director approved ace wrap and surg shoe

## 2022-02-24 NOTE — PROVIDER CONTACT NOTE (OTHER) - BACKGROUND
22 year old male admitted for a fall.
Patient admitted for displaced fracture of distal phalanx of left great toe. PMHx of schizoaffective disorder, bipolar disorder.

## 2022-02-24 NOTE — DISCHARGE NOTE PROVIDER - NSDCMRMEDTOKEN_GEN_ALL_CORE_FT
aluminum hydroxide-magnesium hydroxide 200 mg-200 mg/5 mL oral suspension: 30 milliliter(s) orally every 6 hours, As needed, Dyspepsia  cloNIDine 0.1 mg oral tablet: 1 tab(s) orally 3 times a day  divalproex sodium 250 mg oral tablet, extended release: 5 tab(s) orally once a day (at bedtime)  haloperidol 5 mg oral tablet: 1 tab(s) orally every 4 hours, As needed, agitation  LORazepam 1 mg oral tablet: 1 tab(s) orally once a day (at bedtime)  Mylanta oral suspension: 30 milliliter(s) orally every 6 hours, As Needed - for dyspepsia  risperiDONE 1 mg oral tablet: 1 tab(s) orally 2 times a day   aluminum hydroxide-magnesium hydroxide 200 mg-200 mg/5 mL oral suspension: 30 milliliter(s) orally every 6 hours, As needed, Dyspepsia  cephalexin 500 mg oral capsule: 1 cap(s) orally 4 times a day, last day 3 /4/2022  cloNIDine 0.1 mg oral tablet: 1 tab(s) orally 3 times a day  divalproex sodium 250 mg oral tablet, extended release: 5 tab(s) orally once a day (at bedtime)  haloperidol 5 mg oral tablet: 1 tab(s) orally every 4 hours, As needed, agitation  LORazepam 1 mg oral tablet: 1 tab(s) orally once a day (at bedtime)  Mylanta oral suspension: 30 milliliter(s) orally every 6 hours, As Needed - for dyspepsia  risperiDONE 1 mg oral tablet: 1 tab(s) orally 2 times a day   aluminum hydroxide-magnesium hydroxide 200 mg-200 mg/5 mL oral suspension: 30 milliliter(s) orally every 6 hours, As needed, Dyspepsia  cephalexin 500 mg oral capsule: 1 cap(s) orally 4 times a day, last day 3 /4/2022  cloNIDine 0.1 mg oral tablet: 1 tab(s) orally 3 times a day  divalproex sodium 250 mg oral tablet, extended release: 5 tab(s) orally once a day (at bedtime)  haloperidol 5 mg oral tablet: 1 tab(s) orally every 4 hours, As needed, agitation  LORazepam 1 mg oral tablet: 1 tab(s) orally once a day (at bedtime)  risperiDONE 1 mg oral tablet: 1 tab(s) orally 2 times a day

## 2022-02-24 NOTE — DISCHARGE NOTE PROVIDER - PROVIDER TOKENS
FREE:[LAST:[follow up with Dr. Waterhouse within 1 week of discharge at [facility] [phone].],PHONE:[(   )    -],FAX:[(   )    -]]

## 2022-02-24 NOTE — DISCHARGE NOTE PROVIDER - CARE PROVIDER_API CALL
follow up with Dr. Waterhouse within 1 week of discharge at [facility] [phone].,   Phone: (   )    -  Fax: (   )    -  Follow Up Time:

## 2022-02-24 NOTE — BH CONSULTATION LIAISON PROGRESS NOTE - NSBHASSESSMENTFT_PSY_ALL_CORE
Patient is a 23 yo male no significant PMHX. PPHx of Autism, schizoaffective disorder. Patient coming from [facility], however he resides in [place]. As per chart review, patient reportedly has 30-40 hospitalizations. follows up with a PACT team in [place], no substance use, no trauma hx. This past [facility] admission January 2022, patient was brought in after attempting to fly out of [facility] to go to [place] to meet his two wives ( + delusions) without a plane ticket.  At that time patient was also with + AH, hearing voices telling him to fly to [place]. During his time at [facility], patient was sent to court for treatment over objection which was granted with meds that included Risperdal PO, Invega Sustenna, Depakote, Ativan, Cogentin. Invega Sustenna 234mg was given to patient on 2/16/22. Other meds he’s currently receiving includes Risperdal 1mg BID, Depakote 1250mg qhs,  Lorazepam 1mg qhs.    Patient sent to the ED  due to L. Big toe pain + swelling + laceration.  patient attempted to jump into nursing station, fell awkwardly on his toe.  ED consulted podiatry and they manually reduced the area and recommended IV antibiotics.    2/24: remains with delusions. no PRNs given. No SI or HI. Pending dc to [facility]. patient to return with surgical boot (cleared by [facility])    PLAN  - obtain EKG : antipsychotics can only be given if qtc < 500   - continue [facility] medication regime  --- Invega Sustenna 234mg was given to patient on 2/16/22.  Risperdal 1mg BID, Depakote 1250mg qhs, Lorazepam 1mg qhs.    ---- please obtain labs: VPA level in am, VPA level 52.30  ----  monitor LFTS, ammonia and platelets as patient on Depakote   - PRN for agitation if qtc < 500, haldol 5mg q6hrs PO/IM/IV,  can given with ativan 2mg Po/IM/IV q6hrs for severe agitation  - Remain on CO for impulsive behavior   Dispo: Pending dc to [facility]. patient to return with surgical boot (cleared by [facility])

## 2022-02-24 NOTE — DISCHARGE NOTE PROVIDER - NSDCCPCAREPLAN_GEN_ALL_CORE_FT
PRINCIPAL DISCHARGE DIAGNOSIS  Diagnosis: Open fracture of distal phalanx of left great toe  Assessment and Plan of Treatment: s/p reduction of L hallux fx  Treated w/Ancef 1g BID plan to complete total of 7 days, changed to Keflex.   -  Weight bearing as tolerated to  left foot heel in surgical shoe.   - Patient is to follow up with Dr. Waterhouse within 1 week of [facility] [phone].  PT rec return to [facility] for PT services        SECONDARY DISCHARGE DIAGNOSES  Diagnosis: Schizoaffective disorder  Assessment and Plan of Treatment: COntinue meds as ordered  - Invega Sustenna 234mg   - Risperdal 1mg BID, Depakote 1250mg qhs, Lorazepam 1mg qhs.    VA level 53   QTc 430 on 2/23     PRINCIPAL DISCHARGE DIAGNOSIS  Diagnosis: Open fracture of distal phalanx of left great toe  Assessment and Plan of Treatment: s/p reduction of L hallux fx  Treated w/Ancef changed to Keflex last day 3/4/2022  -  Weight bearing as tolerated to  left foot heel in surgical shoe.   - Patient is to follow up with Dr. Waterhouse within 1 week of discharge at [facility] [phone].  PT rec return to [facility] for PT services        SECONDARY DISCHARGE DIAGNOSES  Diagnosis: Schizoaffective disorder  Assessment and Plan of Treatment: COntinue meds as ordered  - Invega Sustenna 234mg   - Risperdal 1mg BID, Depakote 1250mg qhs, Lorazepam 1mg qhs.    VA level 53   QTc 430 on 2/23

## 2022-02-24 NOTE — PROVIDER CONTACT NOTE (OTHER) - RECOMMENDATIONS
As per provider Jefferson Don, recheck vitals in an hour. No interventions needed at this time. RN will continue to monitor.
Hold PM Clonidine as per parameter, hold for systolic pressure of <100.

## 2022-02-25 ENCOUNTER — INPATIENT (INPATIENT)
Facility: HOSPITAL | Age: 22
LOS: 13 days | Discharge: HOME | End: 2022-03-11
Attending: PSYCHIATRY & NEUROLOGY | Admitting: PSYCHIATRY & NEUROLOGY
Payer: COMMERCIAL

## 2022-02-25 VITALS — TEMPERATURE: 98 F | WEIGHT: 139.99 LBS | HEIGHT: 72 IN

## 2022-02-25 VITALS
RESPIRATION RATE: 18 BRPM | DIASTOLIC BLOOD PRESSURE: 64 MMHG | SYSTOLIC BLOOD PRESSURE: 104 MMHG | TEMPERATURE: 98 F | OXYGEN SATURATION: 100 % | HEART RATE: 74 BPM

## 2022-02-25 DIAGNOSIS — F25.9 SCHIZOAFFECTIVE DISORDER, UNSPECIFIED: ICD-10-CM

## 2022-02-25 LAB
ALBUMIN SERPL ELPH-MCNC: 3.7 G/DL — SIGNIFICANT CHANGE UP (ref 3.3–5)
ALP SERPL-CCNC: 60 U/L — SIGNIFICANT CHANGE UP (ref 40–120)
ALT FLD-CCNC: 7 U/L — SIGNIFICANT CHANGE UP (ref 4–41)
ANION GAP SERPL CALC-SCNC: 8 MMOL/L — SIGNIFICANT CHANGE UP (ref 7–14)
AST SERPL-CCNC: 13 U/L — SIGNIFICANT CHANGE UP (ref 4–40)
BILIRUB SERPL-MCNC: 0.4 MG/DL — SIGNIFICANT CHANGE UP (ref 0.2–1.2)
BUN SERPL-MCNC: 11 MG/DL — SIGNIFICANT CHANGE UP (ref 7–23)
CALCIUM SERPL-MCNC: 9.3 MG/DL — SIGNIFICANT CHANGE UP (ref 8.4–10.5)
CHLORIDE SERPL-SCNC: 104 MMOL/L — SIGNIFICANT CHANGE UP (ref 98–107)
CO2 SERPL-SCNC: 25 MMOL/L — SIGNIFICANT CHANGE UP (ref 22–31)
CREAT SERPL-MCNC: 0.81 MG/DL — SIGNIFICANT CHANGE UP (ref 0.5–1.3)
GLUCOSE SERPL-MCNC: 70 MG/DL — SIGNIFICANT CHANGE UP (ref 70–99)
HCT VFR BLD CALC: 40.1 % — SIGNIFICANT CHANGE UP (ref 39–50)
HGB BLD-MCNC: 13.5 G/DL — SIGNIFICANT CHANGE UP (ref 13–17)
MCHC RBC-ENTMCNC: 29.7 PG — SIGNIFICANT CHANGE UP (ref 27–34)
MCHC RBC-ENTMCNC: 33.7 GM/DL — SIGNIFICANT CHANGE UP (ref 32–36)
MCV RBC AUTO: 88.3 FL — SIGNIFICANT CHANGE UP (ref 80–100)
NRBC # BLD: 0 /100 WBCS — SIGNIFICANT CHANGE UP
NRBC # FLD: 0 K/UL — SIGNIFICANT CHANGE UP
PLATELET # BLD AUTO: 212 K/UL — SIGNIFICANT CHANGE UP (ref 150–400)
POTASSIUM SERPL-MCNC: 4 MMOL/L — SIGNIFICANT CHANGE UP (ref 3.5–5.3)
POTASSIUM SERPL-SCNC: 4 MMOL/L — SIGNIFICANT CHANGE UP (ref 3.5–5.3)
PROT SERPL-MCNC: 6.8 G/DL — SIGNIFICANT CHANGE UP (ref 6–8.3)
RBC # BLD: 4.54 M/UL — SIGNIFICANT CHANGE UP (ref 4.2–5.8)
RBC # FLD: 12.8 % — SIGNIFICANT CHANGE UP (ref 10.3–14.5)
SARS-COV-2 RNA SPEC QL NAA+PROBE: SIGNIFICANT CHANGE UP
SODIUM SERPL-SCNC: 137 MMOL/L — SIGNIFICANT CHANGE UP (ref 135–145)
VALPROATE SERPL-MCNC: 82.5 UG/ML — SIGNIFICANT CHANGE UP (ref 50–100)
WBC # BLD: 6.66 K/UL — SIGNIFICANT CHANGE UP (ref 3.8–10.5)
WBC # FLD AUTO: 6.66 K/UL — SIGNIFICANT CHANGE UP (ref 3.8–10.5)

## 2022-02-25 PROCEDURE — 99233 SBSQ HOSP IP/OBS HIGH 50: CPT

## 2022-02-25 PROCEDURE — 99239 HOSP IP/OBS DSCHRG MGMT >30: CPT

## 2022-02-25 RX ORDER — CEPHALEXIN 500 MG
500 CAPSULE ORAL
Refills: 0 | Status: DISCONTINUED | OUTPATIENT
Start: 2022-02-25 | End: 2022-03-10

## 2022-02-25 RX ORDER — HALOPERIDOL DECANOATE 100 MG/ML
5 INJECTION INTRAMUSCULAR ONCE
Refills: 0 | Status: DISCONTINUED | OUTPATIENT
Start: 2022-02-25 | End: 2022-03-11

## 2022-02-25 RX ORDER — DIPHENHYDRAMINE HCL 50 MG
50 CAPSULE ORAL EVERY 6 HOURS
Refills: 0 | Status: DISCONTINUED | OUTPATIENT
Start: 2022-02-25 | End: 2022-03-11

## 2022-02-25 RX ORDER — DIPHENHYDRAMINE HCL 50 MG
50 CAPSULE ORAL EVERY 4 HOURS
Refills: 0 | Status: DISCONTINUED | OUTPATIENT
Start: 2022-02-25 | End: 2022-02-26

## 2022-02-25 RX ORDER — DIVALPROEX SODIUM 500 MG/1
250 TABLET, DELAYED RELEASE ORAL AT BEDTIME
Refills: 0 | Status: DISCONTINUED | OUTPATIENT
Start: 2022-02-25 | End: 2022-02-25

## 2022-02-25 RX ORDER — DIVALPROEX SODIUM 500 MG/1
1250 TABLET, DELAYED RELEASE ORAL AT BEDTIME
Refills: 0 | Status: DISCONTINUED | OUTPATIENT
Start: 2022-02-25 | End: 2022-03-11

## 2022-02-25 RX ORDER — HALOPERIDOL DECANOATE 100 MG/ML
5 INJECTION INTRAMUSCULAR EVERY 6 HOURS
Refills: 0 | Status: DISCONTINUED | OUTPATIENT
Start: 2022-02-25 | End: 2022-03-11

## 2022-02-25 RX ORDER — RISPERIDONE 4 MG/1
1 TABLET ORAL
Refills: 0 | Status: DISCONTINUED | OUTPATIENT
Start: 2022-02-25 | End: 2022-03-11

## 2022-02-25 RX ORDER — CEPHALEXIN 500 MG
1 CAPSULE ORAL
Qty: 0 | Refills: 0 | DISCHARGE
Start: 2022-02-25

## 2022-02-25 RX ADMIN — Medication 500 MILLIGRAM(S): at 13:09

## 2022-02-25 RX ADMIN — Medication 0.1 MILLIGRAM(S): at 06:33

## 2022-02-25 RX ADMIN — Medication 500 MILLIGRAM(S): at 06:33

## 2022-02-25 RX ADMIN — RISPERIDONE 1 MILLIGRAM(S): 4 TABLET ORAL at 06:32

## 2022-02-25 NOTE — BH CONSULTATION LIAISON PROGRESS NOTE - NSBHFUPINTERVALHXFT_PSY_A_CORE
Chart reviewed. No PRNs given overnight. Patient presented as superficially cooperative. Patient continues to believe he has two wives, but now they are nearby instead of in [place]. Patient could not elaborate why he was trying to fly to [place] prior to admission. Patient understands he broke his toe from attempting to jump over the nurse's station door, reporting he did it for the fun and for the challenge. Patient denied suicidal ideation, homicidal ideation, auditory and visual hallucinations.  
patient was seen and assessed at bedside for follow up. No PRNs required. CO in place.  Patient is calm and cooperative on approach. Patent with no behavioral issues on unit. States his mood is good, no SI or HI.  Patient talks about magical thinking and discusses wit provider all of his girlfriends and fiances (2 living in [facility]) and girlfriends over at [facility]. Patient with grin and large smile when talking about these women.  Delusions remains present at this time. Infrequent, but still occurring AH as per patient, denies at time of this interview.

## 2022-02-25 NOTE — BH CONSULTATION LIAISON PROGRESS NOTE - NSBHMSETHTPROC_PSY_A_CORE
Disorganized/Thought blocking/Illogical/Impaired reasoning Disorganized/Illogical/Impaired reasoning/Other

## 2022-02-25 NOTE — BH CONSULTATION LIAISON PROGRESS NOTE - NSBHCHARTREVIEWVS_PSY_A_CORE FT
Vital Signs Last 24 Hrs  T(C): 36.7 (24 Feb 2022 05:10), Max: 36.8 (23 Feb 2022 13:03)  T(F): 98.1 (24 Feb 2022 05:10), Max: 98.2 (23 Feb 2022 13:03)  HR: 70 (24 Feb 2022 05:10) (70 - 96)  BP: 105/74 (24 Feb 2022 05:10) (94/64 - 127/82)  BP(mean): --  RR: 16 (24 Feb 2022 05:10) (16 - 18)  SpO2: 100% (24 Feb 2022 05:10) (100% - 100%)
Vital Signs Last 24 Hrs  T(C): 36.3 (25 Feb 2022 06:33), Max: 36.4 (24 Feb 2022 13:30)  T(F): 97.4 (25 Feb 2022 06:33), Max: 97.6 (24 Feb 2022 13:30)  HR: 60 (25 Feb 2022 06:33) (60 - 81)  BP: 104/64 (25 Feb 2022 06:33) (101/63 - 104/64)  BP(mean): --  RR: 16 (25 Feb 2022 06:33) (16 - 16)  SpO2: 100% (25 Feb 2022 06:33) (100% - 100%)

## 2022-02-25 NOTE — BH CONSULTATION LIAISON PROGRESS NOTE - NSBHCHARTREVIEWLAB_PSY_A_CORE FT
13.5   7.41  )-----------( 212      ( 23 Feb 2022 06:48 )             40.3   02-23    139  |  105  |  10  ----------------------------<  89  4.4   |  23  |  0.78    Ca    8.9      23 Feb 2022 06:48  Phos  3.7     02-23  Mg     2.00     02-23    TPro  6.8  /  Alb  3.9  /  TBili  0.5  /  DBili  x   /  AST  19  /  ALT  10  /  AlkPhos  59  02-22    
CBC Full  -  ( 25 Feb 2022 07:40 )  WBC Count : 6.66 K/uL  RBC Count : 4.54 M/uL  Hemoglobin : 13.5 g/dL  Hematocrit : 40.1 %  Platelet Count - Automated : 212 K/uL  Mean Cell Volume : 88.3 fL  Mean Cell Hemoglobin : 29.7 pg  Mean Cell Hemoglobin Concentration : 33.7 gm/dL  Auto Neutrophil # : x  Auto Lymphocyte # : x  Auto Monocyte # : x  Auto Eosinophil # : x  Auto Basophil # : x  Auto Neutrophil % : x  Auto Lymphocyte % : x  Auto Monocyte % : x  Auto Eosinophil % : x  Auto Basophil % : x    02-25    137  |  104  |  11  ----------------------------<  70  4.0   |  25  |  0.81    Ca    9.3      25 Feb 2022 07:40    TPro  6.8  /  Alb  3.7  /  TBili  0.4  /  DBili  x   /  AST  13  /  ALT  7   /  AlkPhos  60  02-25

## 2022-02-25 NOTE — CHART NOTE - NSCHARTNOTEFT_GEN_A_CORE
pt seen and examined by me  pt s/p reduction of L hallux fx  PT rec return to [facility] for PT services  pt requires further inpt psych  abx can be change to PO  pt will need ace wrap and surgical shoe while fx heals  will likely need 1:1 at [facility] given the possibility that these items can be used for harm  spoke with Dr Jaimes to clarify with inpt psych regarding his dressing and surgical shoe    CHEST b/l AE  EXT L foot with dressing c/d/i; surgical shoe replaced by me  PSYCH; low voice, coop during my eval  1:1 at bedside    change abx to keflex on dc  outpt podiatry f/u  medically stable to return to [facility] once approp arrangements made for safety  34 min spent with dc planning
pt seen and examined by me this AM  sleeping 1:1 at bedside  abx changed to PO keflex for total 10 days  await response from [facility] if they can accommodate his ace wrap and surgical shoe  PT rec PT services at [facility]  f/u with podiatry in 1 week with Dr Waterhouse  remains medically stable for dc  31 min spent with dc planning
pt seen and examined by me  remains medically stable for return to [facility] with PT service  Dr Andino [facility] medical director approved ace wrap and surg shoe  VSS  to complete keflex as ordered  f/u podiatry in 1 week  hand off given to CARRIE  33 min spent with dc planning  COVID swab P

## 2022-02-25 NOTE — BH CONSULTATION LIAISON PROGRESS NOTE - NSBHASSESSMENTFT_PSY_ALL_CORE
Patient is a 23 yo male no significant PMHX. PPHx of Autism, schizoaffective disorder. Patient coming from [facility], however he resides in [place]. As per chart review, patient reportedly has 30-40 hospitalizations. follows up with a PACT team in [place], no substance use, no trauma hx. This past [facility] admission January 2022, patient was brought in after attempting to fly out of [facility] to go to [place] to meet his two wives ( + delusions) without a plane ticket.  At that time patient was also with + AH, hearing voices telling him to fly to [place]. During his time at [facility], patient was sent to court for treatment over objection which was granted with meds that included Risperdal PO, Invega Sustenna, Depakote, Ativan, Cogentin. Invega Sustenna 234mg was given to patient on 2/16/22. Other meds he’s currently receiving includes Risperdal 1mg BID, Depakote 1250mg qhs,  Lorazepam 1mg qhs.    Patient sent to the ED  due to L. Big toe pain + swelling + laceration.  patient attempted to jump into nursing station, fell awkwardly on his toe.  ED consulted podiatry and they manually reduced the area and recommended IV antibiotics.    2/25: Patient was superficially cooperative and lethargic, requiring prompting to engage in interview. Patient remains delusional about having two wives but now believes they are nearby instead of in [place]. A&Ox4. Patient denied suicidal ideation, homicidal ideation, auditory and visual hallucinations. Patient was informed he will be transferred to [facility] for psychiatric stabilization and safety. Patient was encouraged to maintain behavioral control during transfer. Patient agreed.        PLAN  - obtain EKG : antipsychotics can only be given if qtc < 500   - continue [facility] medication regime  --- Invega Sustenna 234mg was given to patient on 2/16/22.  Risperdal 1mg BID, Depakote 1250mg qhs, Lorazepam 1mg qhs.    ---- please obtain labs: VPA level in am, VPA level 52.30  ----  monitor LFTS, ammonia and platelets as patient on Depakote   - PRN for agitation if qtc < 500, haldol 5mg q6hrs PO/IM/IV,  can given with ativan 2mg Po/IM/IV q6hrs for severe agitation  - Remain on CO for impulsive behavior   Dispo: Pt. will be transferred back to [facility].            Patient is a 23 yo male no significant PMHX. PPHx of Autism, schizoaffective disorder. Patient coming from [facility], however he resides in [place]. As per chart review, patient reportedly has 30-40 hospitalizations. follows up with a PACT team in [place], no substance use, no trauma hx. This past [facility] admission January 2022, patient was brought in after attempting to fly out of [facility] to go to [place] to meet his two wives ( + delusions) without a plane ticket.  At that time patient was also with + AH, hearing voices telling him to fly to [place]. During his time at [facility], patient was sent to court for treatment over objection which was granted with meds that included Risperdal PO, Invega Sustenna, Depakote, Ativan, Cogentin. Invega Sustenna 234mg was given to patient on 2/16/22. Other meds he’s currently receiving includes Risperdal 1mg BID, Depakote 1250mg qhs,  Lorazepam 1mg qhs.    Patient sent to the ED  due to L. Big toe pain + swelling + laceration.  patient attempted to jump into nursing station, fell awkwardly on his toe.  ED consulted podiatry and they manually reduced the area and recommended IV antibiotics.    2/25: Patient was superficially cooperative, somewhat drowsy requiring prompting to engage in interview. Patient remains delusional about having two wives but now believes they are nearby instead of in [place]. A&Ox4. Patient denied suicidal ideation, homicidal ideation, auditory and visual hallucinations. Patient was informed he will be transferred to [facility] for psychiatric stabilization and safety. Patient was encouraged to maintain behavioral control during transfer. Patient agreed.      PLAN  - obtain EKG : antipsychotics can only be given if qtc < 500   - continue [facility] medication regime  --- Invega Sustenna 234mg was given to patient on 2/16/22.  Risperdal 1mg BID, Depakote 1250mg qhs, Lorazepam 1mg qhs.    ---- please obtain labs:  VPA level 52.30  ----  monitor LFTS, ammonia and platelets as patient on Depakote   - PRN for agitation if qtc < 500, haldol 5mg q6hrs PO/IM/IV,  can given with ativan 2mg Po/IM/IV q6hrs for severe agitation  - Remain on CO for impulsive behavior   -Monitor EKG for qtc  Dispo: Pt. will be transferred back to [facility] 2N.            Patient is a 21 yo male no significant PMHX. PPHx of Autism, schizoaffective disorder. Patient coming from [facility], however he resides in [place]. As per chart review, patient reportedly has 30-40 hospitalizations. follows up with a PACT team in [place], no substance use, no trauma hx. This past [facility] admission January 2022, patient was brought in after attempting to fly out of [facility] to go to [place] to meet his two wives ( + delusions) without a plane ticket.  At that time patient was also with + AH, hearing voices telling him to fly to [place]. During his time at [facility], patient was sent to court for treatment over objection which was granted with meds that included Risperdal PO, Invega Sustenna, Depakote, Ativan, Cogentin. Invega Sustenna 234mg was given to patient on 2/16/22. Other meds he’s currently receiving includes Risperdal 1mg BID, Depakote 1250mg qhs,  Lorazepam 1mg qhs.    Patient sent to the ED  due to L. Big toe pain + swelling + laceration.  patient attempted to jump into nursing station, fell awkwardly on his toe.  ED consulted podiatry and they manually reduced the area and recommended IV antibiotics.    2/25: Patient was superficially cooperative, somewhat drowsy requiring prompting to engage in interview. Patient remains delusional about having two wives but now believes they are nearby instead of in [place]. A&Ox4. Patient denied suicidal ideation, homicidal ideation, auditory and visual hallucinations. Patient was informed he will be transferred to [facility] for psychiatric stabilization and safety. Patient was encouraged to maintain behavioral control during transfer. Patient agreed.      PLAN  - obtain EKG : antipsychotics can only be given if qtc < 500   - continue [facility] medication regime  --- Invega Sustenna 234mg was given to patient on 2/16/22.  Risperdal 1mg BID, Depakote 1250mg qhs, Lorazepam 1mg qhs.    ---- please obtain labs:  VPA level 52.30  ----  monitor LFTS, ammonia and platelets as patient on Depakote   - PRN for agitation if qtc < 500, haldol 5mg q6hrs PO/IM/IV,  can given with ativan 2mg Po/IM/IV q6hrs for severe agitation  - Remain on CO for impulsive behavior   -Monitor EKG for qtc  Dispo: Pt. will be transferred back to [facility] 2N. Patient to return with surgical shoe (cleared by [facility])

## 2022-02-25 NOTE — BH CONSULTATION LIAISON PROGRESS NOTE - CASE SUMMARY
Chart reviewed, pt. seen/evaluated with Laya Escobar (NP Student), I agree with above assessment/plan. Patient oriented, superficially engaged, seems limited with concrete/ disorganized thought process, remains delusional, firmly denies suicidal and homicidal ideations, denies auditory and visual hallucinations. Plan as above, pt. will need an inpatient psychiatric admission for safety/stability/medication management.

## 2022-02-25 NOTE — BH CONSULTATION LIAISON PROGRESS NOTE - CURRENT MEDICATION
MEDICATIONS  (STANDING):  cephalexin 500 milliGRAM(s) Oral four times a day  cloNIDine 0.1 milliGRAM(s) Oral three times a day  diVALproex ER 1250 milliGRAM(s) Oral at bedtime  LORazepam     Tablet 1 milliGRAM(s) Oral at bedtime  risperiDONE   Tablet 1 milliGRAM(s) Oral two times a day    MEDICATIONS  (PRN):  aluminum hydroxide/magnesium hydroxide/simethicone Suspension 30 milliLiter(s) Oral every 6 hours PRN Dyspepsia  haloperidol     Tablet 5 milliGRAM(s) Oral every 6 hours PRN Agitation  haloperidol    Injectable 5 milliGRAM(s) IntraMuscular every 6 hours PRN Agitation  haloperidol    Injectable 5 milliGRAM(s) IV Push every 6 hours PRN Agitation  LORazepam     Tablet 2 milliGRAM(s) Oral every 6 hours PRN Severe Agitation  
MEDICATIONS  (STANDING):  cephalexin 500 milliGRAM(s) Oral four times a day  cloNIDine 0.1 milliGRAM(s) Oral three times a day  diVALproex ER 1250 milliGRAM(s) Oral at bedtime  LORazepam     Tablet 1 milliGRAM(s) Oral at bedtime  risperiDONE   Tablet 1 milliGRAM(s) Oral two times a day    MEDICATIONS  (PRN):  aluminum hydroxide/magnesium hydroxide/simethicone Suspension 30 milliLiter(s) Oral every 6 hours PRN Dyspepsia  haloperidol     Tablet 5 milliGRAM(s) Oral every 6 hours PRN Agitation  haloperidol    Injectable 5 milliGRAM(s) IntraMuscular every 6 hours PRN Agitation  haloperidol    Injectable 5 milliGRAM(s) IV Push every 6 hours PRN Agitation  LORazepam     Tablet 2 milliGRAM(s) Oral every 6 hours PRN Severe Agitation

## 2022-02-25 NOTE — DISCHARGE NOTE NURSING/CASE MANAGEMENT/SOCIAL WORK - PATIENT PORTAL LINK FT
You can access the FollowMyHealth Patient Portal offered by [facility] by registering at the following website: [email or web address]. By joining [email or web address]’s FollowMyHealth portal, you will also be able to view your health information using other applications (apps) compatible with our system.

## 2022-02-25 NOTE — DISCHARGE NOTE NURSING/CASE MANAGEMENT/SOCIAL WORK - NSDCPEFALRISK_GEN_ALL_CORE
For information on Fall & Injury Prevention, visit: [email or web address] OR  [email or web address] OR  https://www.cdc.gov/steadi/patient.html
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
## 2022-02-26 PROCEDURE — 99223 1ST HOSP IP/OBS HIGH 75: CPT

## 2022-02-26 PROCEDURE — 99222 1ST HOSP IP/OBS MODERATE 55: CPT

## 2022-02-26 RX ORDER — DIPHENHYDRAMINE HCL 50 MG
50 CAPSULE ORAL ONCE
Refills: 0 | Status: DISCONTINUED | OUTPATIENT
Start: 2022-02-26 | End: 2022-03-11

## 2022-02-26 RX ORDER — HALOPERIDOL DECANOATE 100 MG/ML
5 INJECTION INTRAMUSCULAR ONCE
Refills: 0 | Status: COMPLETED | OUTPATIENT
Start: 2022-02-26 | End: 2022-02-26

## 2022-02-26 RX ORDER — DIPHENHYDRAMINE HCL 50 MG
50 CAPSULE ORAL ONCE
Refills: 0 | Status: COMPLETED | OUTPATIENT
Start: 2022-02-26 | End: 2022-02-26

## 2022-02-26 RX ADMIN — HALOPERIDOL DECANOATE 5 MILLIGRAM(S): 100 INJECTION INTRAMUSCULAR at 18:43

## 2022-02-26 RX ADMIN — Medication 2 MILLIGRAM(S): at 18:43

## 2022-02-26 RX ADMIN — Medication 50 MILLIGRAM(S): at 18:43

## 2022-02-26 NOTE — BH INPATIENT PSYCHIATRY ASSESSMENT NOTE - OTHER PAST PSYCHIATRIC HISTORY (INCLUDE DETAILS REGARDING ONSET, COURSE OF ILLNESS, INPATIENT/OUTPATIENT TREATMENT)
30-40 psych hospitalizations including a state hospitalization for over 10 months starting at the age of 13 years old. Last hospitalization was in [facility] in 08/2021 prior to this [facility] admission in 1/2022. As per psyckes he's diagnosed with schizophrenia but as per mother he's diagnosed with Bipolar I Disorder.   Currently followed by a PACT team in [place] - ([place] is a PACT contact, phone number is [phone])

## 2022-02-26 NOTE — CONSULT NOTE ADULT - ASSESSMENT
22M admitted to [facility] for behavior management, with left great toe laceration and disclocation reduced by podiatry at [facility] 2/22/22, returns to [facility]    Plan:  Left Foot wound: Keflex to complete 5 days. Pt to remain in surgical boot with wound wrapped with ace bandage until follow up with podiatry 3/1/22 Dr Waterhouse [facility] clinic (if can be transported; otherwise please discuss with podiatrist who will come to [facility]).    Behavior management per primary team

## 2022-02-26 NOTE — BH INPATIENT PSYCHIATRY ASSESSMENT NOTE - HPI (INCLUDE ILLNESS QUALITY, SEVERITY, DURATION, TIMING, CONTEXT, MODIFYING FACTORS, ASSOCIATED SIGNS AND SYMPTOMS)
As per  CL Assessment Note on 02/22/2022:    Patient is a 21 yo male no significant PMHX. PPHx of Autism, schizoaffective disorder. Patient coming from [facility], however he resides in [place]. As per chart review, patient reportedly has 30-40 hospitalizations. follows up with a PACT team in [place], no substance use, no trauma hx. This past [facility] admission January 2022, patient was brought in after attempting to fly out of [facility] to go to [place] to meet his two wives ( + delusions) without a plane ticket.  At that time patient was also with + AH, hearing voices telling him to fly to [place]. During his time at [facility], patient was sent to court for treatment over objection which was granted with meds that included Risperdal PO, Invega Sustenna, Depakote, Ativan, Cogentin. Invega Sustenna 234mg was given to patient on 2/16/22. Other meds he’s currently receiving includes Risperdal 1mg BID, Depakote 1250mg qhs, Lorazepam 1mg qhs.    Patient sent to the ED  due to L. Big toe pain + swelling + laceration.  patient attempted to jump into nursing station, fell awkwardly on his toe.  ED consulted podiatry and they manually reduced the area and recommended IV antibiotics.    Patient was seen and assessed at bedside. CO in place.  Patient is calm, cooperative at this time. He is able to state his name, where he is, date, states he is here as he tried to hop a fence and hurt his toe.  Patient without PRN in the ED.  Patient is a limited historian at baseline and is concrete and simple on interview.  Patient denies issues with mood, Patient denies any depressive symptoms including depressed mood, anhedonia, changes in energy/concentration/appetite, sleep disturbances, or feelings of guilt. No dana noted.  Patient does report infrequent AH, but denies AH at this time. Unclear when last experienced hallucinations. Patient with no SI or HI at this time.     Collateral from Mother in  Note.  PACT collateral from [facility] prior to  admission    PACT Team in [place] ([place] is a PACT contact, phone number is office: [phone] ext. [phone], emergency line: [phone]) - Patient since 10/2021. At baseline patient is on the spectrum, he's limited as well. The team has been watching him taking his morning medication 3 days a week and his father watches him take his medications the rest of the time. Father is perceived to be limited. Shivam was last seen on 01/24/2022 and was clean, appropriate and calm. Restricted affect, normal speech. Father is usually intrusive when team is present. 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 Typical of patient to exhibit perseverative speech "I'll be like, I'll be like, I'll be like". [employer] and [employer] are two delusional figments. In the past he has left the house to meet these girls impulsively and got readmitted to the hospital. Concern that patient is abusing alcohol. Given diagnosis of Schizoaffective disorder, bipolar type. In the past has been suicidal but not recently.   Given the fact that he traveled across state lines, PACT team feels he's an acute danger and most likely should be rehospitalized.   Meds: Depakote 1500mg po qd, risperdal 3mg po bid, clonidine hcl 0.1mg po tid and ativan 1mg po qhs    Interview with me on 02/26/2022:    Patient transferred back to  s/ 3 days [facility] admission for IVABx and PT consult.   Since returning to , patient has refused his cephalix as well as his Risperdal 1mg. He reports to feeling nauseous and as per staff, patient has been vomiting while on the unit. Patient is well known to the staff and they report that he frequently vomits. Case was discussed with the GWEN. All vitals were stable.     Patient states he's feeling "okay". He is much more soft spoken than he had been in the ED when writer saw him. He does not endorse any delusions directly but as per prior chart review he continues to feel he has multiple girlfriends in [place] as well as [facility]. Patient states that he occasionally hears a voice that predicts thje future and most recently it predicted that it was going to snow on Sunday. He denies any SI/I/P, HI/I/P at this time and denies that these voices are ever command in nature. He has slept well on the unit but has not been eating well because of the nausea. Offered Zofran but patient refused.

## 2022-02-26 NOTE — BH INPATIENT PSYCHIATRY ASSESSMENT NOTE - CURRENT MEDICATION
MEDICATIONS  (STANDING):  cephalexin 500 milliGRAM(s) Oral four times a day  cloNIDine 0.1 milliGRAM(s) Oral three times a day  diVALproex ER 1250 milliGRAM(s) Oral at bedtime  LORazepam     Tablet 1 milliGRAM(s) Oral at bedtime  risperiDONE   Tablet 1 milliGRAM(s) Oral two times a day    MEDICATIONS  (PRN):  aluminum hydroxide/magnesium hydroxide/simethicone Suspension 30 milliLiter(s) Oral every 6 hours PRN Dyspepsia  diphenhydrAMINE 50 milliGRAM(s) Oral every 6 hours PRN eps prophylaxis  diphenhydrAMINE Injectable 50 milliGRAM(s) IntraMuscular every 4 hours PRN eps prophylaxis  haloperidol     Tablet 5 milliGRAM(s) Oral every 6 hours PRN agitation  haloperidol    Injectable 5 milliGRAM(s) IntraMuscular once PRN agitation  LORazepam     Tablet 2 milliGRAM(s) Oral every 6 hours PRN agitation  LORazepam   Injectable 2 milliGRAM(s) IntraMuscular once PRN agitation

## 2022-02-26 NOTE — BH INPATIENT PSYCHIATRY ASSESSMENT NOTE - NS_RISKASSESSMENTINTER_PSY_ALL_CORE
Low Acute Suicide Risk Epidermal Autograft: The defect edges were debeveled with a #15 scalpel blade.  Given the location of the defect, shape of the defect and the proximity to free margins an epidermal autograft was deemed most appropriate.  Using a sterile surgical marker, the primary defect shape was transferred to the donor site. The epidermal graft was then harvested.  The skin graft was then placed in the primary defect and oriented appropriately.

## 2022-02-26 NOTE — BH INPATIENT PSYCHIATRY ASSESSMENT NOTE - OTHER
denies at this time, but did report recent AH does not discuss delusions at this time of interview but has documented delusions of having multiple girlfriends concrete

## 2022-02-26 NOTE — PSYCHIATRIC REHAB INITIAL EVALUATION - NSBHPRRECOMMEND_PSY_ALL_CORE
Pt is well known to , as pt was discharged to ED from  for hurting his foot. Pt has been admitted back to  following ED visit. As per chart, pt was initially MILTON Davistrenajose picked up from [facility] after attempting to board a plane to [place] without a ticket. As per chart, in ED pt was observed as a poor historian and was extremely concrete in TP. As per chart and his mother, pt has been non-adherent with medications for approximately two weeks. As per chart, pt has history of schizophrenia vs. bipolar I disorder, ASD, 30-40 past psych hospitalizations (most recently discharged from [place] in 8/2021), PACT team in [place].

## 2022-02-26 NOTE — BH INPATIENT PSYCHIATRY ASSESSMENT NOTE - NSBHMETABOLIC_PSY_ALL_CORE_FT
BMI: BMI (kg/m2): 19 (02-25-22 @ 18:30)  HbA1c: A1C with Estimated Average Glucose Result: 5.3 % (01-26-22 @ 10:04)    Glucose:   BP: 133/78 (02-26-22 @ 09:27) (133/78 - 133/78)  Lipid Panel: Date/Time: 01-26-22 @ 10:04  Cholesterol, Serum: 179  Direct LDL: --  HDL Cholesterol, Serum: 82  Total Cholesterol/HDL Ration Measurement: --  Triglycerides, Serum: 62

## 2022-02-26 NOTE — CONSULT NOTE ADULT - SUBJECTIVE AND OBJECTIVE BOX
HPI:     PAST MEDICAL & SURGICAL HISTORY:  Bipolar disorder    Schizoaffective disorder        Review of Systems:   CONSTITUTIONAL: No fever, weight loss, or fatigue  EYES: No eye pain, visual disturbances, or discharge  ENMT:  No difficulty hearing, tinnitus, vertigo; No sinus or throat pain  NECK: No pain or stiffness  RESPIRATORY: No cough, wheezing, chills or hemoptysis; No shortness of breath  CARDIOVASCULAR: No chest pain, palpitations, dizziness, or leg swelling  GASTROINTESTINAL: No abdominal or epigastric pain. No nausea, vomiting, or hematemesis; No diarrhea or constipation. No melena or hematochezia.  GENITOURINARY: No dysuria, frequency, hematuria, or incontinence  NEUROLOGICAL: No headaches, memory loss, loss of strength, numbness, or tremors  SKIN: No itching, burning, rashes, or lesions   LYMPH NODES: No enlarged glands  ENDOCRINE: No heat or cold intolerance; No hair loss  MUSCULOSKELETAL: No joint pain or swelling; No muscle, back, or extremity pain  HEME/LYMPH: No easy bruising, or bleeding gums  ALLERY AND IMMUNOLOGIC: No hives or eczema    Allergies    Allergy Status Unknown    Intolerances        Social History:     FAMILY HISTORY:      MEDICATIONS  (STANDING):  cephalexin 500 milliGRAM(s) Oral four times a day  cloNIDine 0.1 milliGRAM(s) Oral three times a day  diVALproex ER 1250 milliGRAM(s) Oral at bedtime  LORazepam     Tablet 1 milliGRAM(s) Oral at bedtime  risperiDONE   Tablet 1 milliGRAM(s) Oral two times a day    MEDICATIONS  (PRN):  aluminum hydroxide/magnesium hydroxide/simethicone Suspension 30 milliLiter(s) Oral every 6 hours PRN Dyspepsia  diphenhydrAMINE 50 milliGRAM(s) Oral every 6 hours PRN eps prophylaxis  diphenhydrAMINE Injectable 50 milliGRAM(s) IntraMuscular every 4 hours PRN eps prophylaxis  haloperidol     Tablet 5 milliGRAM(s) Oral every 6 hours PRN agitation  haloperidol    Injectable 5 milliGRAM(s) IntraMuscular once PRN agitation  LORazepam     Tablet 2 milliGRAM(s) Oral every 6 hours PRN agitation  LORazepam   Injectable 2 milliGRAM(s) IntraMuscular once PRN agitation      Vital Signs Last 24 Hrs  T(C): 36.7 (26 Feb 2022 09:27), Max: 36.7 (25 Feb 2022 18:30)  T(F): 98 (26 Feb 2022 09:27), Max: 98.1 (25 Feb 2022 18:30)  HR: 105 (26 Feb 2022 09:27) (105 - 105)  BP: 133/78 (26 Feb 2022 09:27) (133/78 - 133/78)  BP(mean): --  RR: --  SpO2: --  CAPILLARY BLOOD GLUCOSE            PHYSICAL EXAM:  GENERAL: NAD, well-developed  HEAD:  Atraumatic, Normocephalic  EYES: EOMI, conjunctiva and sclera clear  NECK: Supple, No JVD  CHEST/LUNG: Clear to auscultation bilaterally; No wheeze  HEART: Regular rate and rhythm; No murmurs, rubs, or gallops  ABDOMEN: Soft, Nontender, Nondistended; Bowel sounds present  EXTREMITIES:  2+ Peripheral Pulses, No clubbing, cyanosis, or edema  NEUROLOGY: non-focal  SKIN: No rashes or lesions    LABS:                        13.5   6.66  )-----------( 212      ( 25 Feb 2022 07:40 )             40.1     02-25    137  |  104  |  11  ----------------------------<  70  4.0   |  25  |  0.81    Ca    9.3      25 Feb 2022 07:40    TPro  6.8  /  Alb  3.7  /  TBili  0.4  /  DBili  x   /  AST  13  /  ALT  7   /  AlkPhos  60  02-25              EKG(personally reviewed):    RADIOLOGY & ADDITIONAL TESTS:    Imaging Personally Reviewed:    Consultant(s) Notes Reviewed:      Care Discussed with Consultants/Other Providers:   HPI: Pt is 22M admitted to [facility] 1/25/22 for behavior management, transferred to [facility] 2/22/22 after jumping and fracturing his toe.  Podiatry treated his toe and wound and he was stable to return to [facility] 2/25/22 for continued behavior management.  He says his toe does not hurt, he is ambulating with surgical boot.    PAST MEDICAL & SURGICAL HISTORY:  Bipolar disorder    Schizoaffective disorder        Review of Systems:   limited ROS due to mental state    Allergies    Allergy Status Unknown    Intolerances        Social History: no etoh tob idu    FAMILY HISTORY: noncontributory      MEDICATIONS  (STANDING):  cephalexin 500 milliGRAM(s) Oral four times a day  cloNIDine 0.1 milliGRAM(s) Oral three times a day  diVALproex ER 1250 milliGRAM(s) Oral at bedtime  LORazepam     Tablet 1 milliGRAM(s) Oral at bedtime  risperiDONE   Tablet 1 milliGRAM(s) Oral two times a day    MEDICATIONS  (PRN):  aluminum hydroxide/magnesium hydroxide/simethicone Suspension 30 milliLiter(s) Oral every 6 hours PRN Dyspepsia  diphenhydrAMINE 50 milliGRAM(s) Oral every 6 hours PRN eps prophylaxis  diphenhydrAMINE Injectable 50 milliGRAM(s) IntraMuscular every 4 hours PRN eps prophylaxis  haloperidol     Tablet 5 milliGRAM(s) Oral every 6 hours PRN agitation  haloperidol    Injectable 5 milliGRAM(s) IntraMuscular once PRN agitation  LORazepam     Tablet 2 milliGRAM(s) Oral every 6 hours PRN agitation  LORazepam   Injectable 2 milliGRAM(s) IntraMuscular once PRN agitation      Vital Signs Last 24 Hrs  T(C): 36.7 (26 Feb 2022 09:27), Max: 36.7 (25 Feb 2022 18:30)  T(F): 98 (26 Feb 2022 09:27), Max: 98.1 (25 Feb 2022 18:30)  HR: 105 (26 Feb 2022 09:27) (105 - 105)  BP: 133/78 (26 Feb 2022 09:27) (133/78 - 133/78)  BP(mean): --  RR: 14            PHYSICAL EXAM:  GENERAL: NAD, well-developed  HEAD:  Atraumatic, Normocephalic  EYES: EOMI, conjunctiva and sclera clear  NECK: Supple, No JVD  CHEST/LUNG: Clear to auscultation bilaterally; No wheeze  HEART: Regular rate and rhythm; No murmurs, rubs, or gallops  ABDOMEN: Soft, Nontender, Nondistended; Bowel sounds present  EXTREMITIES:  foot in surgical boot  NEUROLOGY: non-focal  SKIN: No rashes or lesions    LABS:                        13.5   6.66  )-----------( 212      ( 25 Feb 2022 07:40 )             40.1     02-25    137  |  104  |  11  ----------------------------<  70  4.0   |  25  |  0.81    Ca    9.3      25 Feb 2022 07:40    TPro  6.8  /  Alb  3.7  /  TBili  0.4  /  DBili  x   /  AST  13  /  ALT  7   /  AlkPhos  60  02-25      Consultant(s) Notes Reviewed:  podiatry

## 2022-02-26 NOTE — BH INPATIENT PSYCHIATRY ASSESSMENT NOTE - DESCRIPTION
Lives with Father in [place], works PT at [employer], parents are , wants to get his GED. some siblings live in [place]

## 2022-02-26 NOTE — BH INPATIENT PSYCHIATRY ASSESSMENT NOTE - NSBHASSESSSUMMFT_PSY_ALL_CORE
Summary (brief):  Patient is a 23 yo male no significant PMHX. PPHx of Autism, schizoaffective disorder. Patient coming from [facility], however he resides in [place]. As per chart review, patient reportedly has 30-40 hospitalizations. follows up with a PACT team in [place], no substance use, no trauma hx. This past [facility] admission January 2022, patient was brought in after attempting to fly out of [facility] to go to [place] to meet his two wives ( + delusions) without a plane ticket.  At that time patient was also with + AH, hearing voices telling him to fly to [place]. During his time at [facility], patient was sent to court for treatment over objection which was granted with meds that included Risperdal PO, Invega Sustenna, Depakote, Ativan, Cogentin. Invega Sustenna 234mg was given to patient on 2/16/22. Other meds he’s currently receiving includes Risperdal 1mg BID, Depakote 1250mg qhs,  Lorazepam 1mg qhs.    Patient sent to the ED  due to L. Big toe pain + swelling + laceration.  patient attempted to jump into nursing station, fell awkwardly on his toe.  ED consulted podiatry and they manually reduced the area and recommended IV antibiotics.    Pt sent back to [facility] for continued treatment of psychosis and impulsivity.     PLAN  - obtain EKG : antipsychotics can only be given if qtc < 500   - continue [facility] medication regime  --- Invega Sustenna 234mg was given to patient on 2/16/22.  Risperdal 1mg BID (pt non adherent for last day) , Depakote 1250mg qhs, Lorazepam 1mg qhs.    ---- please obtain labs: VPA level in am, VPA level 82.50 - 2/25/2022  ----  monitor LFTS, ammonia and platelets as patient on Depakote   - PRN for agitation if qtc < 500, haldol 5mg q6hrs PO/IM/,  can given with ativan 2mg Po/IM/ q6hrs for severe agitation    encourage adherence to Cephalexin 500mg po 4x a day

## 2022-02-27 DIAGNOSIS — F84.0 AUTISTIC DISORDER: ICD-10-CM

## 2022-02-27 LAB
ALBUMIN SERPL ELPH-MCNC: 4.2 G/DL — SIGNIFICANT CHANGE UP (ref 3.3–5)
ALP SERPL-CCNC: 68 U/L — SIGNIFICANT CHANGE UP (ref 40–120)
ALT FLD-CCNC: 11 U/L — SIGNIFICANT CHANGE UP (ref 4–41)
AMMONIA BLD-MCNC: 30 UMOL/L — SIGNIFICANT CHANGE UP (ref 11–55)
ANION GAP SERPL CALC-SCNC: 11 MMOL/L — SIGNIFICANT CHANGE UP (ref 7–14)
AST SERPL-CCNC: 32 U/L — SIGNIFICANT CHANGE UP (ref 4–40)
BASOPHILS # BLD AUTO: 0.01 K/UL — SIGNIFICANT CHANGE UP (ref 0–0.2)
BASOPHILS NFR BLD AUTO: 0.2 % — SIGNIFICANT CHANGE UP (ref 0–2)
BILIRUB SERPL-MCNC: 0.5 MG/DL — SIGNIFICANT CHANGE UP (ref 0.2–1.2)
BUN SERPL-MCNC: 15 MG/DL — SIGNIFICANT CHANGE UP (ref 7–23)
CALCIUM SERPL-MCNC: 9.5 MG/DL — SIGNIFICANT CHANGE UP (ref 8.4–10.5)
CHLORIDE SERPL-SCNC: 101 MMOL/L — SIGNIFICANT CHANGE UP (ref 98–107)
CO2 SERPL-SCNC: 26 MMOL/L — SIGNIFICANT CHANGE UP (ref 22–31)
CREAT SERPL-MCNC: 0.97 MG/DL — SIGNIFICANT CHANGE UP (ref 0.5–1.3)
CULTURE RESULTS: SIGNIFICANT CHANGE UP
CULTURE RESULTS: SIGNIFICANT CHANGE UP
EOSINOPHIL # BLD AUTO: 0.04 K/UL — SIGNIFICANT CHANGE UP (ref 0–0.5)
EOSINOPHIL NFR BLD AUTO: 0.7 % — SIGNIFICANT CHANGE UP (ref 0–6)
GLUCOSE SERPL-MCNC: 71 MG/DL — SIGNIFICANT CHANGE UP (ref 70–99)
HCT VFR BLD CALC: 42.9 % — SIGNIFICANT CHANGE UP (ref 39–50)
HGB BLD-MCNC: 14.5 G/DL — SIGNIFICANT CHANGE UP (ref 13–17)
IANC: 3.07 K/UL — SIGNIFICANT CHANGE UP (ref 1.5–8.5)
IMM GRANULOCYTES NFR BLD AUTO: 0.3 % — SIGNIFICANT CHANGE UP (ref 0–1.5)
LYMPHOCYTES # BLD AUTO: 1.84 K/UL — SIGNIFICANT CHANGE UP (ref 1–3.3)
LYMPHOCYTES # BLD AUTO: 31.4 % — SIGNIFICANT CHANGE UP (ref 13–44)
MCHC RBC-ENTMCNC: 30 PG — SIGNIFICANT CHANGE UP (ref 27–34)
MCHC RBC-ENTMCNC: 33.8 GM/DL — SIGNIFICANT CHANGE UP (ref 32–36)
MCV RBC AUTO: 88.8 FL — SIGNIFICANT CHANGE UP (ref 80–100)
MONOCYTES # BLD AUTO: 0.88 K/UL — SIGNIFICANT CHANGE UP (ref 0–0.9)
MONOCYTES NFR BLD AUTO: 15 % — HIGH (ref 2–14)
NEUTROPHILS # BLD AUTO: 3.07 K/UL — SIGNIFICANT CHANGE UP (ref 1.8–7.4)
NEUTROPHILS NFR BLD AUTO: 52.4 % — SIGNIFICANT CHANGE UP (ref 43–77)
NRBC # BLD: 0 /100 WBCS — SIGNIFICANT CHANGE UP
NRBC # FLD: 0 K/UL — SIGNIFICANT CHANGE UP
PLATELET # BLD AUTO: 222 K/UL — SIGNIFICANT CHANGE UP (ref 150–400)
POTASSIUM SERPL-MCNC: 4.2 MMOL/L — SIGNIFICANT CHANGE UP (ref 3.5–5.3)
POTASSIUM SERPL-SCNC: 4.2 MMOL/L — SIGNIFICANT CHANGE UP (ref 3.5–5.3)
PROT SERPL-MCNC: 7.3 G/DL — SIGNIFICANT CHANGE UP (ref 6–8.3)
RBC # BLD: 4.83 M/UL — SIGNIFICANT CHANGE UP (ref 4.2–5.8)
RBC # FLD: 12.6 % — SIGNIFICANT CHANGE UP (ref 10.3–14.5)
SODIUM SERPL-SCNC: 138 MMOL/L — SIGNIFICANT CHANGE UP (ref 135–145)
SPECIMEN SOURCE: SIGNIFICANT CHANGE UP
SPECIMEN SOURCE: SIGNIFICANT CHANGE UP
WBC # BLD: 5.86 K/UL — SIGNIFICANT CHANGE UP (ref 3.8–10.5)
WBC # FLD AUTO: 5.86 K/UL — SIGNIFICANT CHANGE UP (ref 3.8–10.5)

## 2022-02-27 PROCEDURE — 99232 SBSQ HOSP IP/OBS MODERATE 35: CPT

## 2022-02-27 RX ORDER — CHLORPROMAZINE HCL 10 MG
50 TABLET ORAL ONCE
Refills: 0 | Status: COMPLETED | OUTPATIENT
Start: 2022-02-27 | End: 2022-02-27

## 2022-02-27 RX ORDER — ONDANSETRON 8 MG/1
4 TABLET, FILM COATED ORAL EVERY 6 HOURS
Refills: 0 | Status: DISCONTINUED | OUTPATIENT
Start: 2022-02-27 | End: 2022-02-27

## 2022-02-27 RX ORDER — ONDANSETRON 8 MG/1
4 TABLET, FILM COATED ORAL EVERY 6 HOURS
Refills: 0 | Status: DISCONTINUED | OUTPATIENT
Start: 2022-02-27 | End: 2022-03-11

## 2022-02-27 RX ADMIN — Medication 2 MILLIGRAM(S): at 13:48

## 2022-02-27 RX ADMIN — Medication 50 MILLIGRAM(S): at 13:49

## 2022-02-27 NOTE — BH INPATIENT PSYCHIATRY PROGRESS NOTE - NSBHASSESSSUMMFT_PSY_ALL_CORE
Patient is a 23 yo male no significant PMHX. PPHx of Autism, schizoaffective disorder. Patient coming from [facility], however he resides in [place]. As per chart review, patient reportedly has 30-40 hospitalizations. follows up with a PACT team in [place], no substance use, no trauma hx. This past [facility] admission January 2022, patient was brought in after attempting to fly out of [facility] to go to [place] to meet his two wives ( + delusions) without a plane ticket.  At that time patient was also with + AH, hearing voices telling him to fly to [place]. During his time at [facility], patient was sent to court for treatment over objection which was granted with meds that included Risperdal PO, Invega Sustenna, Depakote, Ativan, Cogentin. Invega Sustenna 234mg was given to patient on 2/16/22. Other meds he’s currently receiving includes Risperdal 1mg BID, Depakote 1250mg qhs,  Lorazepam 1mg qhs.    Patient sent to the ED  due to L. Big toe pain + swelling + laceration.  patient attempted to jump into nursing station, fell awkwardly on his toe.  ED consulted podiatry and they manually reduced the area and recommended IV antibiotics.    Pt sent back to [facility] for continued treatment of psychosis and impulsivity.     PLAN    - continue [facility] medication regime  --- Invega Sustenna 234mg was given to patient on 2/16/22.  Risperdal 1mg BID (pt non adherent for last day) , Depakote 1250mg qhs, Lorazepam 1mg qhs.    ---- please obtain labs: cbc with diff, cmp for vomiting   VPA level 82.50 - 2/25/2022  ----  monitor LFTS, ammonia and platelets as patient on Depakote   - PRN for agitation if qtc < 500, haldol 5mg q6hrs PO/IM/,  can given with ativan 2mg Po/IM/ q6hrs for severe agitation  zofran 4mg po prn for nausea and vomiting

## 2022-02-27 NOTE — BH PATIENT PROFILE - FALL HARM RISK - RISK INTERVENTIONS

## 2022-02-27 NOTE — BH INPATIENT PSYCHIATRY PROGRESS NOTE - NSBHFUPINTERVALHXFT_PSY_A_CORE
Patient has been refusing all medication since being readmitted to [facility]. When discussed the importance of taking his antibiotic he states that he will heal himself. He says it's a self fulfilling prophecy so that if he wishes himself to be better, he will. He has been sleeping fine on the unit and walking with the CO.   Once writer stepped away from patient, he went to do the day room and vomited everywhere. Nursing states patient intermittently vomits however with an active toe infection, labs were ordered. CBC and CMP were ordered, vitals will be rechecked. Dr. Salomon GWEN notified about following up with labs. If patient continues to vomit, exhibits any vital sign instability, pt should go to ED.  Patient has been refusing all medication since being readmitted to . He received PRNS of Haldol 5mg, Ativan 2mg and Benadryl 50mg IM at 643pm yesterday evening for agitation. When discussed the importance of taking his antibiotic he states that he will heal himself and "he's fine". He says it's a self fulfilling prophecy so that if he wishes himself to be better, he will. He has been sleeping fine on the unit and walking with the CO.   Once writer stepped away from patient, he went to do the day room and vomited everywhere. Nursing states patient intermittently vomits however with an active toe infection, labs were ordered. CBC and CMP were ordered, vitals will be rechecked. Dr. Salomon GWEN notified about following up with labs. If patient continues to vomit, exhibits any vital sign instability, pt should go to ED.

## 2022-02-27 NOTE — BH INPATIENT PSYCHIATRY PROGRESS NOTE - PRN MEDS
MEDICATIONS  (PRN):  aluminum hydroxide/magnesium hydroxide/simethicone Suspension 30 milliLiter(s) Oral every 6 hours PRN Dyspepsia  diphenhydrAMINE 50 milliGRAM(s) Oral every 6 hours PRN eps prophylaxis  diphenhydrAMINE Injectable 50 milliGRAM(s) IntraMuscular once PRN severe agitation  haloperidol     Tablet 5 milliGRAM(s) Oral every 6 hours PRN agitation  haloperidol    Injectable 5 milliGRAM(s) IntraMuscular once PRN agitation  LORazepam     Tablet 2 milliGRAM(s) Oral every 6 hours PRN agitation  LORazepam   Injectable 2 milliGRAM(s) IntraMuscular once PRN agitation   MEDICATIONS  (PRN):  aluminum hydroxide/magnesium hydroxide/simethicone Suspension 30 milliLiter(s) Oral every 6 hours PRN Dyspepsia  diphenhydrAMINE 50 milliGRAM(s) Oral every 6 hours PRN eps prophylaxis  diphenhydrAMINE Injectable 50 milliGRAM(s) IntraMuscular once PRN severe agitation  haloperidol     Tablet 5 milliGRAM(s) Oral every 6 hours PRN agitation  haloperidol    Injectable 5 milliGRAM(s) IntraMuscular once PRN agitation  LORazepam     Tablet 2 milliGRAM(s) Oral every 6 hours PRN agitation  LORazepam   Injectable 2 milliGRAM(s) IntraMuscular once PRN agitation  ondansetron    Tablet 4 milliGRAM(s) Oral every 6 hours PRN nausea

## 2022-02-27 NOTE — BH INPATIENT PSYCHIATRY PROGRESS NOTE - CURRENT MEDICATION
MEDICATIONS  (STANDING):  cephalexin 500 milliGRAM(s) Oral four times a day  cloNIDine 0.1 milliGRAM(s) Oral three times a day  diVALproex ER 1250 milliGRAM(s) Oral at bedtime  LORazepam     Tablet 1 milliGRAM(s) Oral at bedtime  risperiDONE   Tablet 1 milliGRAM(s) Oral two times a day    MEDICATIONS  (PRN):  aluminum hydroxide/magnesium hydroxide/simethicone Suspension 30 milliLiter(s) Oral every 6 hours PRN Dyspepsia  diphenhydrAMINE 50 milliGRAM(s) Oral every 6 hours PRN eps prophylaxis  diphenhydrAMINE Injectable 50 milliGRAM(s) IntraMuscular once PRN severe agitation  haloperidol     Tablet 5 milliGRAM(s) Oral every 6 hours PRN agitation  haloperidol    Injectable 5 milliGRAM(s) IntraMuscular once PRN agitation  LORazepam     Tablet 2 milliGRAM(s) Oral every 6 hours PRN agitation  LORazepam   Injectable 2 milliGRAM(s) IntraMuscular once PRN agitation   MEDICATIONS  (STANDING):  cephalexin 500 milliGRAM(s) Oral four times a day  cloNIDine 0.1 milliGRAM(s) Oral three times a day  diVALproex ER 1250 milliGRAM(s) Oral at bedtime  LORazepam     Tablet 1 milliGRAM(s) Oral at bedtime  risperiDONE   Tablet 1 milliGRAM(s) Oral two times a day    MEDICATIONS  (PRN):  aluminum hydroxide/magnesium hydroxide/simethicone Suspension 30 milliLiter(s) Oral every 6 hours PRN Dyspepsia  diphenhydrAMINE 50 milliGRAM(s) Oral every 6 hours PRN eps prophylaxis  diphenhydrAMINE Injectable 50 milliGRAM(s) IntraMuscular once PRN severe agitation  haloperidol     Tablet 5 milliGRAM(s) Oral every 6 hours PRN agitation  haloperidol    Injectable 5 milliGRAM(s) IntraMuscular once PRN agitation  LORazepam     Tablet 2 milliGRAM(s) Oral every 6 hours PRN agitation  LORazepam   Injectable 2 milliGRAM(s) IntraMuscular once PRN agitation  ondansetron    Tablet 4 milliGRAM(s) Oral every 6 hours PRN nausea

## 2022-02-27 NOTE — BH PATIENT PROFILE - HOME MEDICATIONS
cephalexin 500 mg oral capsule , 1 cap(s) orally 4 times a day, last day 3 /4/2022  LORazepam 1 mg oral tablet , 1 tab(s) orally once a day (at bedtime)  risperiDONE 1 mg oral tablet , 1 tab(s) orally 2 times a day  haloperidol 5 mg oral tablet , 1 tab(s) orally every 4 hours, As needed, agitation  divalproex sodium 250 mg oral tablet, extended release , 5 tab(s) orally once a day (at bedtime)  aluminum hydroxide-magnesium hydroxide 200 mg-200 mg/5 mL oral suspension , 30 milliliter(s) orally every 6 hours, As needed, Dyspepsia  cloNIDine 0.1 mg oral tablet , 1 tab(s) orally 3 times a day

## 2022-02-27 NOTE — BH PATIENT PROFILE - FUNCTIONAL ASSESSMENT - DAILY ACTIVITY 1.
November 24, 2017      Mohan Schwartz MD  [id number] [address] [address]           [facility]  [id number] [place] [id number]  Phone: [phone]  Fax: [phone]          Patient: Dre Curiel   MR Number: [id number]   YOB: 1968   Date of Visit: 11/24/2017       Dear Dr. Mohan Schwartz:    Thank you for referring Dre Curiel to me for evaluation. Attached you will find relevant portions of my assessment and plan of care.    If you have questions, please do not hesitate to call me. I look forward to following Dre Curiel along with you.    Sincerely,    Dilan Zarate MD    Enclosure  CC:  No Recipients    If you would like to receive this communication electronically, please contact [email or web address] or [phone] to request more information on [employer] access.    For providers and/or their staff who would like to refer a patient to [facility], please contact us through our one-stop-shop provider referral line, [facility], at [phone].    If you feel you have received this communication in error or would no longer like to receive these types of communications, please e-mail [email or web address]         
4 = No assist / stand by assistance

## 2022-02-28 PROCEDURE — 99232 SBSQ HOSP IP/OBS MODERATE 35: CPT

## 2022-02-28 NOTE — BH INPATIENT PSYCHIATRY PROGRESS NOTE - NSBHFUPINTERVALHXFT_PSY_A_CORE
Pt today despite being told his psychiatrist is out, keeps insisting that his psychiatrist will be here today.  Does not answer questions about his foot.  Terminates interview by walking away.

## 2022-02-28 NOTE — BH INPATIENT PSYCHIATRY PROGRESS NOTE - NSBHASSESSSUMMFT_PSY_ALL_CORE
22M w/pphx autism, schizoaffective disorder, hx of 30+ admissions in [place], has PACT in [place], admitted to [facility] in 01/2022 with delusions, voices, influencing behavior.  Psychiatric admission interrupted by need for medical hospitalization for manual reduction of toe and IV Abx, pt now returned to  for further psychiatric care.     Invega Sustenna 234 mg was given to patient on 2/16/22.  Overdue for second loading injection.  Noncompliant with oral meds since readmission to .  Continue CO 1:1 for now.  Monitoring for nausea/vomiting, may be abx related. 22M w/pphx autism, schizoaffective disorder, hx of 30+ admissions in [place], [place] in [place], admitted to [facility] in 01/2022 with delusions, voices, influencing behavior.  Psychiatric admission interrupted by need for medical hospitalization for manual reduction of toe and IV Abx, pt now returned to  for further psychiatric care.     Invega Sustenna 234 mg was given to patient on 2/16/22.  Overdue for second loading injection.  Noncompliant with oral meds since readmission to .  May need to consider reapplication for tx over objection.  Continue CO 1:1 for now.  Monitoring for nausea/vomiting, may be abx related.

## 2022-02-28 NOTE — BH SOCIAL WORK INITIAL PSYCHOSOCIAL EVALUATION - OTHER PAST PSYCHIATRIC HISTORY (INCLUDE DETAILS REGARDING ONSET, COURSE OF ILLNESS, INPATIENT/OUTPATIENT TREATMENT)
Per EMR pt has a hx of schizophrenia, ASD paranoia/delusions Per EMR pt has a hx of schizophrenia, ASD paranoia/delusions, has had multiple inpt admissions over the yrs 30-40 documented, pt is linked to ACT team in [place] for treatment, pt has a hx of generally good tx compliance.  Per EMR pt has no hx of SA/SI, has a hx of AH, hears voices and has paranoid delusions when sx are decompensated, pt has no hx of substance abuse, no hx of violent aggression, no legal hx

## 2022-02-28 NOTE — BH INPATIENT PSYCHIATRY PROGRESS NOTE - PRN MEDS
MEDICATIONS  (PRN):  aluminum hydroxide/magnesium hydroxide/simethicone Suspension 30 milliLiter(s) Oral every 6 hours PRN Dyspepsia  diphenhydrAMINE 50 milliGRAM(s) Oral every 6 hours PRN eps prophylaxis  diphenhydrAMINE Injectable 50 milliGRAM(s) IntraMuscular once PRN severe agitation  haloperidol     Tablet 5 milliGRAM(s) Oral every 6 hours PRN agitation  haloperidol    Injectable 5 milliGRAM(s) IntraMuscular once PRN agitation  LORazepam     Tablet 2 milliGRAM(s) Oral every 6 hours PRN agitation  LORazepam   Injectable 2 milliGRAM(s) IntraMuscular once PRN agitation  ondansetron    Tablet 4 milliGRAM(s) Oral every 6 hours PRN nausea

## 2022-03-01 PROCEDURE — 99231 SBSQ HOSP IP/OBS SF/LOW 25: CPT

## 2022-03-01 RX ORDER — CHLORPROMAZINE HCL 10 MG
50 TABLET ORAL ONCE
Refills: 0 | Status: COMPLETED | OUTPATIENT
Start: 2022-03-01 | End: 2022-03-01

## 2022-03-01 RX ADMIN — Medication 2 MILLIGRAM(S): at 18:04

## 2022-03-01 RX ADMIN — Medication 50 MILLIGRAM(S): at 18:04

## 2022-03-01 NOTE — BH INPATIENT PSYCHIATRY PROGRESS NOTE - CURRENT MEDICATION
MEDICATIONS  (STANDING):  cephalexin 500 milliGRAM(s) Oral four times a day  cloNIDine 0.1 milliGRAM(s) Oral three times a day  diVALproex ER 1250 milliGRAM(s) Oral at bedtime  LORazepam     Tablet 1 milliGRAM(s) Oral at bedtime  risperiDONE   Tablet 1 milliGRAM(s) Oral two times a day    MEDICATIONS  (PRN):  aluminum hydroxide/magnesium hydroxide/simethicone Suspension 30 milliLiter(s) Oral every 6 hours PRN Dyspepsia  diphenhydrAMINE 50 milliGRAM(s) Oral every 6 hours PRN eps prophylaxis  diphenhydrAMINE Injectable 50 milliGRAM(s) IntraMuscular once PRN severe agitation  haloperidol     Tablet 5 milliGRAM(s) Oral every 6 hours PRN agitation  haloperidol    Injectable 5 milliGRAM(s) IntraMuscular once PRN agitation  LORazepam     Tablet 2 milliGRAM(s) Oral every 6 hours PRN agitation  LORazepam   Injectable 2 milliGRAM(s) IntraMuscular once PRN agitation  ondansetron    Tablet 4 milliGRAM(s) Oral every 6 hours PRN nausea

## 2022-03-01 NOTE — BH INPATIENT PSYCHIATRY PROGRESS NOTE - NSBHASSESSSUMMFT_PSY_ALL_CORE
22M w/pphx autism, schizoaffective disorder, h[place] of [place]+ [place] in [place], [place], admitted to [email or web address] in 01/2022 with delusions, voices, influencing behavior.  Psychiatric admission interrupted by need for medical hospitalization for manual reduction of toe and IV Abx, pt now returned to  for further psychiatric care.     remains disorganized  22M w/pphx autism, schizoaffective disorder, hx of [place]+ admissions in [place], [place] in [place], admitted to [facility] in 01/2022 with delusions, voices, influencing behavior.  Psychiatric admission interrupted by need for medical hospitalization for manual reduction of toe and IV Abx, pt now returned to  for further psychiatric care.     remains disorganized, illogical, has impaired reality testing, does not have capacity to refuse medications. Team to pursue treatment over objection. Incapacitation likely due to decompensated psychotic symptoms

## 2022-03-01 NOTE — BH INPATIENT PSYCHIATRY PROGRESS NOTE - NSBHFUPINTERVALHXFT_PSY_A_CORE
Chart reviewed including pertinent labs, imaging, ekg. case discussed with treatment team.  Over this interval: remains on CO for disorganization, intrusiveness, and aggression. refusing meds, states he doesn't need them. Wants to go home, does not want to take any medications offered

## 2022-03-02 PROCEDURE — 99231 SBSQ HOSP IP/OBS SF/LOW 25: CPT

## 2022-03-02 RX ORDER — PALIPERIDONE 1.5 MG/1
156 TABLET, EXTENDED RELEASE ORAL ONCE
Refills: 0 | Status: DISCONTINUED | OUTPATIENT
Start: 2022-03-02 | End: 2022-03-02

## 2022-03-02 RX ADMIN — Medication 0.1 MILLIGRAM(S): at 21:02

## 2022-03-02 RX ADMIN — DIVALPROEX SODIUM 1250 MILLIGRAM(S): 500 TABLET, DELAYED RELEASE ORAL at 21:02

## 2022-03-02 RX ADMIN — Medication 1 MILLIGRAM(S): at 21:02

## 2022-03-02 RX ADMIN — Medication 500 MILLIGRAM(S): at 21:02

## 2022-03-02 RX ADMIN — RISPERIDONE 1 MILLIGRAM(S): 4 TABLET ORAL at 21:02

## 2022-03-02 NOTE — BH INPATIENT PSYCHIATRY PROGRESS NOTE - NSBHFUPINTERVALHXFT_PSY_A_CORE
Chart reviewed including pertinent labs, imaging, ekg. case discussed with treatment team.  Over this interval: pt remains on CO, refusing meds, attempted to jump into nursing station again last night and required STAT meds. Patient declines wanting to take medications, states he just wants his belongings. Patient making contradictory statements including his numerous prior hospitalizations and wanting to leave the hospital, but also that he wants to stay in the hospital but not take medications

## 2022-03-02 NOTE — BH INPATIENT PSYCHIATRY PROGRESS NOTE - NSBHASSESSSUMMFT_PSY_ALL_CORE
22M w/pphx autism, schizoaffective disorder, [place] of [place] in [place], [place], admitted to [email or web address] in 01/2022 with delusions, voices, influencing behavior.  Psychiatric admission interrupted by need for medical hospitalization for manual reduction of toe and IV Abx, pt now returned to  for further psychiatric care.     continues to remain disorganized, unpredictable is on CO and tried to jump into nursing station again last night requiring STAT meds. Patient's insight is poor, judgement poor and impulses limited likely attributed to poor reality testing in setting of psychosis that is inadequately treated due to patient's non-adherence to meds. team pursuing TOO     c/w CO  continue meds as ordered

## 2022-03-02 NOTE — BH CHART NOTE - NSEVENTNOTEFT_PSY_ALL_CORE
DIRECTOR OF INPATIENT PSYCHIATRY NOTE:  I have evaluated the patient’s need for medication over his objection in the presence of the LS  Mr. William Braga, the risks and benefits of medication, and his ability to make a reasoned decision.      Briefly, the pt is a 22M w/pphx autism, schizoaffective disorder, h[place] of [place] in [place], [place], admitted to [facility] in 01/2022 with delusions, voices, influencing behavior.  Psychiatric admission interrupted by need for medical hospitalization for manual reduction of toe and IV Abx, pt now returned to  for further psychiatric care.     On evaluation, the patient has psychomotor retardation and seemed to have difficulty finishing his sentence. Also he was observed to mumbling and mouthing words inaudibly. Staff reported that he appears disorganized and tried to jump over the nursing station.  He has prescribed Keflex,  Depakote ER, Clonidine and Risperdal.    He received as needed medication by mouth and by injection for agitation.    He has not been taking medications.  He does not understand the extent of his illness.    It is my opinion that this patient currently experiences psychotic symptoms that are severely impacting his safety and ability to care for himself, and would likely benefit from antipsychotic medications.  Furthermore, it is my opinion that he lacks capacity to refuse antipsychotic therapy.  I have informed the patient of my decision and that unless he withdraws his objection to taking medications, we will make application to court for authorization to treat him over his objection. I have notified the patient and LS of this decision by letter.

## 2022-03-03 PROCEDURE — 99231 SBSQ HOSP IP/OBS SF/LOW 25: CPT

## 2022-03-03 RX ORDER — HALOPERIDOL DECANOATE 100 MG/ML
5 INJECTION INTRAMUSCULAR ONCE
Refills: 0 | Status: COMPLETED | OUTPATIENT
Start: 2022-03-03 | End: 2022-03-03

## 2022-03-03 RX ORDER — DIPHENHYDRAMINE HCL 50 MG
50 CAPSULE ORAL ONCE
Refills: 0 | Status: COMPLETED | OUTPATIENT
Start: 2022-03-03 | End: 2022-03-03

## 2022-03-03 RX ADMIN — Medication 2 MILLIGRAM(S): at 00:40

## 2022-03-03 RX ADMIN — HALOPERIDOL DECANOATE 5 MILLIGRAM(S): 100 INJECTION INTRAMUSCULAR at 00:39

## 2022-03-03 RX ADMIN — Medication 50 MILLIGRAM(S): at 00:39

## 2022-03-03 NOTE — BH INPATIENT PSYCHIATRY PROGRESS NOTE - NSBHASSESSSUMMFT_PSY_ALL_CORE
22M w/pphx autism, schizoaffective disorder, h[place] of [place] in [place], [place], admitted to [facility] in 01/2022 with delusions, voices, influencing behavior.  Psychiatric admission interrupted by need for medical hospitalization for manual reduction of toe and IV Abx, pt now returned to  for further psychiatric care.     continues to refuse meds, received STAT meds again last night because he attempted to jump into nursing station. Patient expressing delusional content, and also AH of god telling him the future. Patient with poor insight, and with behavioral disorganization leading to patient's inability to care for self and putting himself at risk for self-harm (as evident by his fractured toe when he attempted to jump into nursing station), and he continues to exhibit this behavior. Patient lacks insight to refuse meds. team pursuing TOO     c/w CO  continue meds as ordered

## 2022-03-03 NOTE — BH INPATIENT PSYCHIATRY PROGRESS NOTE - NSBHFUPINTERVALHXFT_PSY_A_CORE
Chart reviewed including pertinent labs, imaging, ekg. case discussed with treatment team.  Over this interval: patient remains on CO, remains disorganized, unpredictable, tried to jump into nursing station again (this previously led to patient fracturing his toe). 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 Patient requested to not jump over nursing station and he says he will comply. He tells me his two girlfriends Firekaela and Celsoirenashad continue to visit him daily (nursing reports indicate patient has no visitors) and during different hours of the day. Patient states he does not have their phone number but that they "just know" that he is here in the hospital. He plans to go to [facility] on discharge and wants to take his girlfriends with him. PAtient also reporting hearing "just once voice, the voice of god" and that the voice tells him "the future."

## 2022-03-04 PROCEDURE — 99231 SBSQ HOSP IP/OBS SF/LOW 25: CPT

## 2022-03-04 RX ADMIN — Medication 1 MILLIGRAM(S): at 21:29

## 2022-03-04 RX ADMIN — Medication 0.1 MILLIGRAM(S): at 21:35

## 2022-03-04 RX ADMIN — RISPERIDONE 1 MILLIGRAM(S): 4 TABLET ORAL at 21:35

## 2022-03-04 RX ADMIN — DIVALPROEX SODIUM 1250 MILLIGRAM(S): 500 TABLET, DELAYED RELEASE ORAL at 21:36

## 2022-03-04 NOTE — MEDICAL LEGAL COORDINATOR PROGRESS NOTE - COMMENTS
I served patient with a "[employer]" where as the hospital is seeking to medicate patient over their objection. I also placed a copy in patient chart.

## 2022-03-04 NOTE — BH TREATMENT PLAN - NSTXDCFAMINTERSW_PSY_ALL_CORE
SW spoke with pt, could use motivation to stay engaged with supports in place, SW to provide psycho-ed as needed to motivate pt more

## 2022-03-04 NOTE — BH INPATIENT PSYCHIATRY PROGRESS NOTE - NSBHASSESSSUMMFT_PSY_ALL_CORE
22M w/pphx autism, schizoaffective disorder, hx of [place] admissions in [place], [place], admitted to [facility] in 01/2022 with delusions, voices, influencing behavior.  Psychiatric admission interrupted by need for medical hospitalization for manual reduction of toe and IV Abx, pt now returned to  for further psychiatric care.     refusing medications, no overt behavioral issues. remains on CO. lacks insight to refuse meds. team pursuing TOO     c/w CO  continue meds as ordered

## 2022-03-04 NOTE — BH TREATMENT PLAN - NSTXCOPEINTERRN_PSY_ALL_CORE
Encourage patient to verbalize all feelings of distress and to utilize positive effective coping skills

## 2022-03-04 NOTE — BH INPATIENT PSYCHIATRY PROGRESS NOTE - NSBHFUPINTERVALHXFT_PSY_A_CORE
Chart reviewed including pertinent labs, imaging, ekg. case discussed with treatment team.  Over this interval: patient remains on CO, no overt behavioral issues. Refusing all meds again over this interval. States he doesn't need them.

## 2022-03-04 NOTE — BH TREATMENT PLAN - NSTXPLANTHERAPYSESSIONSFT_PSY_ALL_CORE
03-03-22  Type of therapy: Creative arts therapy,Leisure development,Peer advocate,Stress management  Type of session: Individual  Level of patient participation: Engaged  Duration of participation: 20 minutes  Therapy conducted by: Psych rehab  Therapy Summary: Writer met with patient to assess patients progress of psychiatric rehabilitation goal over the past seven days. Patient has not demonstrated medication compliance, which is resulting in treatment team pursuing medicaitons over objection order frm court. Patient currently maintains CO 1:1 due to foot injury sustained from jumping into nursing station. Over the past seven days, patient has been working on the goal of identifying effective coping skills that meet patients needs. Patient demonstrated minimal progress towards this goal. Patient endorsed coping skills such as reading or singing, however acknowledged not engaging coping skills. Patient would benefit from continuing to work on this goal for the next seven days. Patient has attended approximately 43 percent of psychiatric rehabilitation groups over the past seven days. Patient demonstrates difficulty tolerating group structure which is evident by patient talking provocatively and interrupting other patients during discussions. Patient is visible on the milieu. Patient has demonstrated poor boundaries with his peers and has been observed to take the belongings of other patients. Patient has not been in good behavioral control on the unit and has been observed to attempted to jump into nursing station on several occasions, which resulted in patient requiring PRN medications. Psych rehab will continue to work with patient daily to provide psychoeducation and needed support.

## 2022-03-04 NOTE — BH TREATMENT PLAN - NSTXCOPEINTERPR_PSY_ALL_CORE
Patient has demonstrated minimal progress towards this goal. Psych rehab staff recommends patient engages with treatment to assist patient with symptom reduction over the next seven days.

## 2022-03-05 PROCEDURE — 99231 SBSQ HOSP IP/OBS SF/LOW 25: CPT

## 2022-03-05 RX ADMIN — Medication 1 MILLIGRAM(S): at 21:26

## 2022-03-05 RX ADMIN — Medication 2 MILLIGRAM(S): at 09:17

## 2022-03-05 RX ADMIN — Medication 0.1 MILLIGRAM(S): at 15:43

## 2022-03-05 RX ADMIN — Medication 0.1 MILLIGRAM(S): at 09:18

## 2022-03-05 RX ADMIN — RISPERIDONE 1 MILLIGRAM(S): 4 TABLET ORAL at 09:17

## 2022-03-05 RX ADMIN — Medication 0.1 MILLIGRAM(S): at 21:26

## 2022-03-05 RX ADMIN — DIVALPROEX SODIUM 1250 MILLIGRAM(S): 500 TABLET, DELAYED RELEASE ORAL at 21:26

## 2022-03-05 RX ADMIN — RISPERIDONE 1 MILLIGRAM(S): 4 TABLET ORAL at 21:26

## 2022-03-05 NOTE — BH INPATIENT PSYCHIATRY PROGRESS NOTE - NSBHASSESSSUMMFT_PSY_ALL_CORE
appropriate behavioral control over this interval. remains on CO due to disorganization. reports he will take medications today though adherence pattern throughout hospital course has been poor and team pursuing treatment over objection. c/w meds as ordered

## 2022-03-05 NOTE — BH INPATIENT PSYCHIATRY PROGRESS NOTE - NSBHFUPINTERVALHXFT_PSY_A_CORE
chart reviewed. case discussed with nursing staff. Over this interval: cooperative and pleasant on approach, states he is thinking about moving to [place]. Remains on CO due to disorganization. Reports he will take his medications. Inquires about when he can get his shoes back

## 2022-03-06 PROCEDURE — 99231 SBSQ HOSP IP/OBS SF/LOW 25: CPT

## 2022-03-06 RX ADMIN — Medication 1 MILLIGRAM(S): at 20:25

## 2022-03-06 RX ADMIN — Medication 0.1 MILLIGRAM(S): at 08:09

## 2022-03-06 RX ADMIN — RISPERIDONE 1 MILLIGRAM(S): 4 TABLET ORAL at 20:25

## 2022-03-06 RX ADMIN — DIVALPROEX SODIUM 1250 MILLIGRAM(S): 500 TABLET, DELAYED RELEASE ORAL at 20:25

## 2022-03-06 RX ADMIN — RISPERIDONE 1 MILLIGRAM(S): 4 TABLET ORAL at 08:09

## 2022-03-06 RX ADMIN — Medication 0.1 MILLIGRAM(S): at 13:00

## 2022-03-06 RX ADMIN — Medication 0.1 MILLIGRAM(S): at 20:25

## 2022-03-06 NOTE — BH INPATIENT PSYCHIATRY PROGRESS NOTE - NSBHASSESSSUMMFT_PSY_ALL_CORE
no behavioral issues over this interval, took meds over this interval. has no complaints. remains on CO due to ongoing disorganization (manifestation of which led to open fracture of his toe). In contact with podiatry and awaiting to hear back. CO re-ordered. will c/w meds as ordered and will encourage pt to take THOMAS

## 2022-03-06 NOTE — BH INPATIENT PSYCHIATRY PROGRESS NOTE - NSBHFUPINTERVALHXFT_PSY_A_CORE
chart reviewed. case discussed with nursing staff. Over this interval:  patient adherent to meds over this interval, in better behavioral control, remains on CO due to disorganization. Does not have any complaints. no AH expressed. no SI.

## 2022-03-07 PROCEDURE — 99231 SBSQ HOSP IP/OBS SF/LOW 25: CPT

## 2022-03-07 RX ORDER — PALIPERIDONE 1.5 MG/1
156 TABLET, EXTENDED RELEASE ORAL ONCE
Refills: 0 | Status: COMPLETED | OUTPATIENT
Start: 2022-03-07 | End: 2022-03-07

## 2022-03-07 RX ADMIN — Medication 0.1 MILLIGRAM(S): at 08:28

## 2022-03-07 RX ADMIN — Medication 0.1 MILLIGRAM(S): at 12:55

## 2022-03-07 RX ADMIN — DIVALPROEX SODIUM 1250 MILLIGRAM(S): 500 TABLET, DELAYED RELEASE ORAL at 20:36

## 2022-03-07 RX ADMIN — RISPERIDONE 1 MILLIGRAM(S): 4 TABLET ORAL at 08:28

## 2022-03-07 RX ADMIN — Medication 0.1 MILLIGRAM(S): at 20:36

## 2022-03-07 RX ADMIN — RISPERIDONE 1 MILLIGRAM(S): 4 TABLET ORAL at 20:35

## 2022-03-07 RX ADMIN — PALIPERIDONE 156 MILLIGRAM(S): 1.5 TABLET, EXTENDED RELEASE ORAL at 11:23

## 2022-03-07 RX ADMIN — Medication 1 MILLIGRAM(S): at 20:35

## 2022-03-07 NOTE — BH INPATIENT PSYCHIATRY PROGRESS NOTE - CURRENT MEDICATION
MEDICATIONS  (STANDING):  cephalexin 500 milliGRAM(s) Oral four times a day  cloNIDine 0.1 milliGRAM(s) Oral three times a day  diVALproex ER 1250 milliGRAM(s) Oral at bedtime  LORazepam     Tablet 1 milliGRAM(s) Oral at bedtime  paliperidone Injectable, Long Acting 156 milliGRAM(s) IntraMuscular once  risperiDONE   Tablet 1 milliGRAM(s) Oral two times a day    MEDICATIONS  (PRN):  aluminum hydroxide/magnesium hydroxide/simethicone Suspension 30 milliLiter(s) Oral every 6 hours PRN Dyspepsia  diphenhydrAMINE 50 milliGRAM(s) Oral every 6 hours PRN eps prophylaxis  diphenhydrAMINE Injectable 50 milliGRAM(s) IntraMuscular once PRN severe agitation  haloperidol     Tablet 5 milliGRAM(s) Oral every 6 hours PRN agitation  haloperidol    Injectable 5 milliGRAM(s) IntraMuscular once PRN agitation  LORazepam     Tablet 2 milliGRAM(s) Oral every 6 hours PRN agitation  LORazepam   Injectable 2 milliGRAM(s) IntraMuscular once PRN agitation  ondansetron    Tablet 4 milliGRAM(s) Oral every 6 hours PRN nausea

## 2022-03-07 NOTE — BH INPATIENT PSYCHIATRY PROGRESS NOTE - NSBHASSESSSUMMFT_PSY_ALL_CORE
22M w/pphx autism, schizoaffective disorder, hx of [place]+ admissions in [place], [place], admitted to [facility] in 01/2022 with delusions, voices, influencing behavior.  Psychiatric admission interrupted by need for medical hospitalization for manual reduction of toe and IV Abx, pt now returned to  for further psychiatric care.     in better behavioral control with more reactive affect, taking meds, took THOMAS Invega Sustenna 156mg IM this morning. Spoke with podiatry and requested that they see patient; awaiting to hear back. while patient is behaviorally improving, this is new and still has potential for behavioral disorganization and will benefit from CO as we continually assess its need.     c/w CO  continue meds as ordered

## 2022-03-07 NOTE — BH INPATIENT PSYCHIATRY PROGRESS NOTE - NSBHFUPINTERVALHXFT_PSY_A_CORE
Chart reviewed including pertinent labs, imaging, ekg. case discussed with treatment team.  Over this interval: patient in better behavioral control over this interval and the weekeend, and more open to working with team. Had been adherent with ordered meds this weekend and accepted Invega Sustenna 156mg IM this AM. Patient denies SI and HI. Denies AVH. remains apprehensive about returning home

## 2022-03-08 PROCEDURE — 99231 SBSQ HOSP IP/OBS SF/LOW 25: CPT

## 2022-03-08 RX ADMIN — Medication 1 MILLIGRAM(S): at 20:42

## 2022-03-08 RX ADMIN — Medication 0.1 MILLIGRAM(S): at 09:32

## 2022-03-08 RX ADMIN — RISPERIDONE 1 MILLIGRAM(S): 4 TABLET ORAL at 20:42

## 2022-03-08 RX ADMIN — Medication 0.1 MILLIGRAM(S): at 12:42

## 2022-03-08 RX ADMIN — RISPERIDONE 1 MILLIGRAM(S): 4 TABLET ORAL at 09:20

## 2022-03-08 RX ADMIN — Medication 0.1 MILLIGRAM(S): at 20:42

## 2022-03-08 RX ADMIN — DIVALPROEX SODIUM 1250 MILLIGRAM(S): 500 TABLET, DELAYED RELEASE ORAL at 20:42

## 2022-03-08 NOTE — BH INPATIENT PSYCHIATRY PROGRESS NOTE - NSBHASSESSSUMMFT_PSY_ALL_CORE
23 yo male, schizoaffective disorder, ASD, extensive hospitalizations, no prior suicide attempts.     Over this interval, patient in appropriate behavioral control, pleasant, able to follow staff directions, able to make his needs known. Given improvement in symptoms and with IM THOMAS on board, will trial patient of CO as be is better organized over the last few days and more adherent to medications. Will order for supervised shave. On going hospitalization now due to dispo issues and coordinating with sw

## 2022-03-08 NOTE — BH INPATIENT PSYCHIATRY PROGRESS NOTE - NSBHFUPINTERVALHXFT_PSY_A_CORE
Chart reviewed including pertinent labs, imaging, ekg. case discussed with treatment team.  Over this interval: patient has been in appropriate behavioral control over the past 4 days, taking his medications, received invega sustenna IM yesterday. Patient able to better follow staff directions and able to verbalize his needs. Wants to shave.

## 2022-03-09 PROCEDURE — 99231 SBSQ HOSP IP/OBS SF/LOW 25: CPT

## 2022-03-09 RX ORDER — DIVALPROEX SODIUM 500 MG/1
1 TABLET, DELAYED RELEASE ORAL
Qty: 14 | Refills: 0
Start: 2022-03-09 | End: 2022-03-22

## 2022-03-09 RX ORDER — PALIPERIDONE 1.5 MG/1
156 TABLET, EXTENDED RELEASE ORAL
Qty: 1 | Refills: 0
Start: 2022-03-09

## 2022-03-09 RX ORDER — DIVALPROEX SODIUM 500 MG/1
2 TABLET, DELAYED RELEASE ORAL
Qty: 28 | Refills: 0
Start: 2022-03-09 | End: 2022-03-22

## 2022-03-09 RX ADMIN — RISPERIDONE 1 MILLIGRAM(S): 4 TABLET ORAL at 21:29

## 2022-03-09 RX ADMIN — Medication 0.1 MILLIGRAM(S): at 21:28

## 2022-03-09 RX ADMIN — Medication 0.1 MILLIGRAM(S): at 12:34

## 2022-03-09 RX ADMIN — RISPERIDONE 1 MILLIGRAM(S): 4 TABLET ORAL at 08:02

## 2022-03-09 RX ADMIN — Medication 0.1 MILLIGRAM(S): at 08:01

## 2022-03-09 RX ADMIN — DIVALPROEX SODIUM 1250 MILLIGRAM(S): 500 TABLET, DELAYED RELEASE ORAL at 21:28

## 2022-03-09 RX ADMIN — Medication 1 MILLIGRAM(S): at 21:28

## 2022-03-09 NOTE — BH INPATIENT PSYCHIATRY DISCHARGE NOTE - NSBHDCHANDOFFFT_PSY_ALL_CORE
[place] PACT team in contact and aware of patient's hospitalization, treatment and discharge plans. DC summary to be faxed over. Inpatient team can be contacted at [phone]

## 2022-03-09 NOTE — BH INPATIENT PSYCHIATRY DISCHARGE NOTE - NSDCPROCEDURESFT_PSY_ALL_CORE
labs ekg wnl labs ekg wnl    foot xray 3/10/22:   IMPRESSION:  No dislocations, displaced fractures, or discrete suspicious fracture   lines appreciated radiographically, particularly regarding hallux. If   this remains a concern, MRI may be helpful to further assess as warranted.    Tarsometatarsal alignment maintained without evidence for a Lisfranc   injury.  Preserved joint spaces and no joint margin erosions.  No calcaneal spurring and unremarkable distal Achilles tendon shadow.  No discrete lytic or blastic lesions.

## 2022-03-09 NOTE — BH INPATIENT PSYCHIATRY DISCHARGE NOTE - NSDCRECOMMENDMEDICALFT_PSY_ALL_CORE
-Please take medications as reconciled  -Please follow up with your primary care doctor as necessary  -Please follow up with your psychiatric appointment as detailed  -Please follow up with podiatry outpatient to follow up with foot injury  -Please go to nearest ED or call 911 in case of psychiatric or medical emergency.  podiatrist recommendations: if patient to discharged patient can f/u at [facility] podiatry clinic for f/u.  please follow up with podiatrist on discharge to remove suture.

## 2022-03-09 NOTE — BH INPATIENT PSYCHIATRY DISCHARGE NOTE - NSDCMRMEDTOKEN_GEN_ALL_CORE_FT
Ativan 1 mg oral tablet: 1 tab(s) orally once a day (at bedtime) MDD:1mg  cloNIDine 0.1 mg oral tablet: 1 tab(s) orally 3 times a day  Depakote  mg oral tablet, extended release: 1 tab(s) orally once a day (at bedtime), total 1250mg at bedtime  Depakote  mg oral tablet, extended release: 2 tab(s) orally once a day (at bedtime), total 1250mg qhs   Ativan 1 mg oral tablet: 1 tab(s) orally once a day (at bedtime) MDD:1mg  cloNIDine 0.1 mg oral tablet: 1 tab(s) orally 3 times a day  Depakote  mg oral tablet, extended release: 1 tab(s) orally once a day (at bedtime), total 1250mg at bedtime  Depakote  mg oral tablet, extended release: 2 tab(s) orally once a day (at bedtime), total 1250mg qhs  LORazepam 1 mg oral tablet: 1 tab(s) orally once a day (at bedtime) MDD:1mg

## 2022-03-09 NOTE — BH INPATIENT PSYCHIATRY PROGRESS NOTE - NSBHASSESSSUMMFT_PSY_ALL_CORE
appropriate behavioral control over this interval. CO discontinued and pt tolerating well. shaved on his own yesterday (under supervision), caring for ADLs independently. Received Invega Sustenna earlier this week. Pt stable for discharge with ongoing psychiatric care by PACT team.

## 2022-03-09 NOTE — BH INPATIENT PSYCHIATRY PROGRESS NOTE - NSBHFUPINTERVALHXFT_PSY_A_CORE
Chart reviewed including pertinent labs, imaging, ekg. case discussed with treatment team.  Over this interval: pt in behavioral control, taking his medications, caring for self independently. Off CO x 1 day and tolerating well. pt's father in contact and informed of discharge plans. Patient is also informed, agrees.

## 2022-03-09 NOTE — BH INPATIENT PSYCHIATRY DISCHARGE NOTE - NSBHMETABOLIC_PSY_ALL_CORE_FT
BMI: BMI (kg/m2): 19 (02-25-22 @ 18:30)  HbA1c: A1C with Estimated Average Glucose Result: 5.3 % (01-26-22 @ 10:04)    Glucose:   BP: 102/75 (03-07-22 @ 08:17) (98/65 - 102/75)  Lipid Panel: Date/Time: 01-26-22 @ 10:04  Cholesterol, Serum: 179  Direct LDL: --  HDL Cholesterol, Serum: 82  Total Cholesterol/HDL Ration Measurement: --  Triglycerides, Serum: 62

## 2022-03-09 NOTE — BH INPATIENT PSYCHIATRY DISCHARGE NOTE - HOSPITAL COURSE
Patient re-admitted to the unit after sustaining injury while attempting to jump into nursing station, requiring several days of inpatient medicine admission for IV antibiotics. On return, patient was placed on CO because he again attempted to jump into nursing station. He was refusing medications, TOO pursued by primary team. As hospital course progressed, patient began taking medications voluntarily, and ultimately accepted Invega Sustenna 156mg IM on 3/17/22. There were several episodes of agitation / disorganization that warranted IM administration of STAT meds. this was prior to patient's consistent adherence to meds, and subsequent to consistent med adherence, patient displayed better behavioral control, relatively more organized speech, was mostly calm and cooperative, listened to staff directions, able to care for self-independently.     Patient with diagnosed ASD. There may at times be overlap with psychiatric symptoms and ASD that present as confounding challenges . Those with autism can present as psychologically rigid, have poor frustration tolerance, impulsivity, and may engage in self-harming behavior as a poor adaptive mechanism to cope with frustrations that they are unable to appropriately verbalize. This is evident with this patient. Psychiatric illnesses and autism spectrum disorder are not mutually exclusive and certain characteristics can overlap. Treatable symptoms including depressive and psychotic symptoms are appropriately addressed. Impulsivity associated with ASD also appropriately addressed as best as we are able (medications). The most suitable treatment plan for those with concurrent psychiatric illness and ASD include med management and optimization (addressed during the hospital course), and a suitable nurturing, supportive and stable environment with limited overstimulation or abrupt change. While patient’s impulsivity, poor frustration tolerance, and hx of engaging in self-injurious behavior indicate a chronically elevated risk for self-harm, all mitigating strategies have been implemented including symptom stabilization, med optimization (including THOMAS Invega Sustenna) and coordinating appropriate outpatient services (pt returning to his home, with outpatient support from PACT team). At this time, patient has achieved the full benefits for what an inpatient hospitalization will provide and can safely be transitioned to a setting that is less restrictive.      Safety planning conducted with pt, and discharge was discussed with multi-disciplinary team. Patient progressed well and their symptoms stabilized by time of discharge. Patient was adherent with medications and by day of discharge, did not have any active psychiatric symptoms. Patient with improved mental status exam and with improved insight judgement and impulse control. Patient able to participate in the modalities of therapy offered in this inpatient setting. They are able to participate in safe disposition planning. At present, patient is not a danger to self or others, has achieved optimal benefits from hospitalization, and thus appropriate for less restrictive level of care. Pt was visible on unit and social with select peers, attended groups, has been in appropriate behavioral control, was future oriented, and on discharge able to consistently deny suicidality and homicidality, and maintained ADLs independently.     Patient is at chronically elevated risk for self-harm due to male sex, psych dx, hx of non-adherence to treatment, hx of impulsivity, poor insight when non-adherent to medications. At this time, those risks are mitigated by patient’s expressed desire to live. Patient currently denies SIIP.    Protective factors include current stability of symptoms, no prior suicidal ideations or attempts, current adherence to medications at this time, future oriented thinking, improved frustration tolerance as evidenced by no recent behavioral issues on the unit, improved insight and judgement, abstinence from substance while in a controlled setting, Hinduism and spirituality, supportive family members, access to treatment and care, no access to fire arms, PACT team in place.     At present, patient has remained in good behavioral control while inpatient. They have not recently displayed any concerning symptoms of behavioral dysregulation or decompensation, has been actively participating in the milieu, expressing future oriented thinking and is insightful about self-harm. Currently, patient is adamant about their denial of suicidal ideation intent or plan. They are not at acutely elevated risk for self-harm at this time.    low acute risk for harm to others - risk factors include male sex, psychiatric diagnosis, chronic poor impulses and frustration tolerance. protective factors include patient not endorsing harm to others, understands consequences of harm to others, has no access to guns, is future oriented.      Patient re-admitted to the unit after sustaining injury while attempting to jump into nursing station, requiring several days of inpatient medicine admission for IV antibiotics. On return, patient was placed on CO because he again attempted to jump into nursing station. He was refusing medications, TOO pursued by primary team. As hospital course progressed, patient began taking medications voluntarily, and ultimately accepted Invega Sustenna 156mg IM on 3/17/22. There were several episodes of agitation / disorganization that warranted IM administration of STAT meds. this was prior to patient's consistent adherence to meds, and subsequent to consistent med adherence, patient displayed better behavioral control, relatively more organized speech, was mostly calm and cooperative, listened to staff directions, able to care for self-independently.     Patient with diagnosed ASD. There may at times be overlap with psychiatric symptoms and ASD that present as confounding challenges . Those with autism can present as psychologically rigid, have poor frustration tolerance, impulsivity, and may engage in self-harming behavior as a poor adaptive mechanism to cope with frustrations that they are unable to appropriately verbalize. This is evident with this patient. Psychiatric illnesses and autism spectrum disorder are not mutually exclusive and certain characteristics can overlap. Treatable symptoms including depressive and psychotic symptoms are appropriately addressed. Impulsivity associated with ASD also appropriately addressed as best as we are able (medications). The most suitable treatment plan for those with concurrent psychiatric illness and ASD include med management and optimization (addressed during the hospital course), and a suitable nurturing, supportive and stable environment with limited overstimulation or abrupt change. While patient’s impulsivity, poor frustration tolerance, and hx of engaging in self-injurious behavior indicate a chronically elevated risk for self-harm, all mitigating strategies have been implemented including symptom stabilization, med optimization (including THOMAS Invega Sustenna) and coordinating appropriate outpatient services (pt returning to his home, with outpatient support from PACT team). At this time, patient has achieved the full benefits for what an inpatient hospitalization will provide and can safely be transitioned to a setting that is less restrictive.  Podiatry team saw patient, cleaned wound, recommended xray (unremarkable) and reported they would return to remove suture and if patient discharged, can follow up outpatient for podiatry.     Safety planning conducted with pt, and discharge was discussed with multi-disciplinary team. Patient progressed well and their symptoms stabilized by time of discharge. Patient was adherent with medications and by day of discharge, did not have any active psychiatric symptoms. Patient with improved mental status exam and with improved insight judgement and impulse control. Patient able to participate in the modalities of therapy offered in this inpatient setting. They are able to participate in safe disposition planning. At present, patient is not a danger to self or others, has achieved optimal benefits from hospitalization, and thus appropriate for less restrictive level of care. Pt was visible on unit and social with select peers, attended groups, has been in appropriate behavioral control, was future oriented, and on discharge able to consistently deny suicidality and homicidality, and maintained ADLs independently.     Patient is at chronically elevated risk for self-harm due to male sex, psych dx, hx of non-adherence to treatment, hx of impulsivity, poor insight when non-adherent to medications. At this time, those risks are mitigated by patient’s expressed desire to live. Patient currently denies SIIP.    Protective factors include current stability of symptoms, no prior suicidal ideations or attempts, current adherence to medications at this time, future oriented thinking, improved frustration tolerance as evidenced by no recent behavioral issues on the unit, improved insight and judgement, abstinence from substance while in a controlled setting, Jehovah's witness and spirituality, supportive family members, access to treatment and care, no access to fire arms, PACT team in place.     At present, patient has remained in good behavioral control while inpatient. They have not recently displayed any concerning symptoms of behavioral dysregulation or decompensation, has been actively participating in the milieu, expressing future oriented thinking and is insightful about self-harm. Currently, patient is adamant about their denial of suicidal ideation intent or plan. They are not at acutely elevated risk for self-harm at this time.    low acute risk for harm to others - risk factors include male sex, psychiatric diagnosis, chronic poor impulses and frustration tolerance. protective factors include patient not endorsing harm to others, understands consequences of harm to others, has no access to guns, is future oriented.

## 2022-03-09 NOTE — BH INPATIENT PSYCHIATRY DISCHARGE NOTE - DESCRIPTION
Lives with Father in [place], works PT at [employer], parents are , wants to get his GED. some siblings live in [place]

## 2022-03-09 NOTE — BH INPATIENT PSYCHIATRY DISCHARGE NOTE - HPI (INCLUDE ILLNESS QUALITY, SEVERITY, DURATION, TIMING, CONTEXT, MODIFYING FACTORS, ASSOCIATED SIGNS AND SYMPTOMS)
As per  CL Assessment Note on 02/22/2022:    Patient is a 21 yo male no significant PMHX. PPHx of Autism, schizoaffective disorder. Patient coming from [facility], however he resides in [place]. As per chart review, patient reportedly has 30-40 hospitalizations. follows up with a PACT team in [place], no substance use, no trauma hx. This past [facility] admission January 2022, patient was brought in after attempting to fly out of [facility] to go to [place] to meet his two wives ( + delusions) without a plane ticket.  At that time patient was also with + AH, hearing voices telling him to fly to [place]. During his time at [facility], patient was sent to court for treatment over objection which was granted with meds that included Risperdal PO, Invega Sustenna, Depakote, Ativan, Cogentin. Invega Sustenna 234mg was given to patient on 2/16/22. Other meds he’s currently receiving includes Risperdal 1mg BID, Depakote 1250mg qhs, Lorazepam 1mg qhs.    Patient sent to the ED  due to L. Big toe pain + swelling + laceration.  patient attempted to jump into nursing station, fell awkwardly on his toe.  ED consulted podiatry and they manually reduced the area and recommended IV antibiotics.    Patient was seen and assessed at bedside. CO in place.  Patient is calm, cooperative at this time. He is able to state his name, where he is, date, states he is here as he tried to hop a fence and hurt his toe.  Patient without PRN in the ED.  Patient is a limited historian at baseline and is concrete and simple on interview.  Patient denies issues with mood, Patient denies any depressive symptoms including depressed mood, anhedonia, changes in energy/concentration/appetite, sleep disturbances, or feelings of guilt. No dana noted.  Patient does report infrequent AH, but denies AH at this time. Unclear when last experienced hallucinations. Patient with no SI or HI at this time.     Collateral from Mother in  Note.  PACT collateral from [facility] prior to  admission    PACT Team in [place] ([place] is a PACT contact, phone number is office: [phone] ext. [phone], emergency line: [phone]) - Patient since 10/2021. At baseline patient is on the spectrum, he's limited as well. The team has been watching him taking his morning medication 3 days a week and his father watches him take his medications the rest of the time. Father is perceived to be limited. Shivam was last seen on 01/24/2022 and was clean, appropriate and calm. Restricted affect, normal speech. Father is usually intrusive when team is present. 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 Typical of patient to exhibit perseverative speech "I'll be like, I'll be like, I'll be like". [employer] and [employer] are two delusional figments. In the past he has left the house to meet these girls impulsively and got readmitted to the hospital. Concern that patient is abusing alcohol. Given diagnosis of Schizoaffective disorder, bipolar type. In the past has been suicidal but not recently.   Given the fact that he traveled across state lines, PACT team feels he's an acute danger and most likely should be rehospitalized.   Meds: Depakote 1500mg po qd, risperdal 3mg po bid, clonidine hcl 0.1mg po tid and ativan 1mg po qhs    Interview with me on 02/26/2022:    Patient transferred back to  s/ 3 days [facility] admission for IVABx and PT consult.   Since returning to , patient has refused his cephalix as well as his Risperdal 1mg. He reports to feeling nauseous and as per staff, patient has been vomiting while on the unit. Patient is well known to the staff and they report that he frequently vomits. Case was discussed with the [facility]. All vitals were stable.     Patient states he's feeling "okay". He is much more soft spoken than he had been in the ED when writer saw him. He does not endorse any delusions directly but as per prior chart review he continues to feel he has multiple girlfriends in [place] as well as [facility]. Patient states that he occasionally hears a voice that predicts thje future and most recently it predicted that it was going to snow on Sunday. He denies any SI/I/P, HI/I/P at this time and denies that these voices are ever command in nature. He has slept well on the unit but has not been eating well because of the nausea. Offered Zofran but patient refused.

## 2022-03-09 NOTE — BH INPATIENT PSYCHIATRY DISCHARGE NOTE - NSBHDCRISKMITIGATE_PSY_ALL_CORE
Safety planning/Reduction in access to lethal methods (pills, firearms, etc)/Referral to ACT Team/Family/Other social support involvement

## 2022-03-09 NOTE — BH INPATIENT PSYCHIATRY DISCHARGE NOTE - NSDCCPCAREPLAN_GEN_ALL_CORE_FT
PRINCIPAL DISCHARGE DIAGNOSIS  Diagnosis: Schizoaffective disorder  Assessment and Plan of Treatment: invega sustenna + depakote      SECONDARY DISCHARGE DIAGNOSES  Diagnosis: Autism  Assessment and Plan of Treatment: christo

## 2022-03-09 NOTE — BH INPATIENT PSYCHIATRY DISCHARGE NOTE - OTHER PAST PSYCHIATRIC HISTORY (INCLUDE DETAILS REGARDING ONSET, COURSE OF ILLNESS, INPATIENT/OUTPATIENT TREATMENT)
Per EMR pt has a hx of schizophrenia, ASD paranoia/delusions, has had multiple inpt admissions over the yrs 30-40 documented, pt is linked to ACT team in [place] for treatment, pt has a hx of generally good tx compliance.  Per EMR pt has no hx of SA/SI, has a hx of AH, hears voices and has paranoid delusions when sx are decompensated, pt has no hx of substance abuse, no hx of violent aggression, no legal hx

## 2022-03-10 PROCEDURE — 73630 X-RAY EXAM OF FOOT: CPT | Mod: 26,LT

## 2022-03-10 PROCEDURE — 99231 SBSQ HOSP IP/OBS SF/LOW 25: CPT

## 2022-03-10 RX ADMIN — Medication 1 MILLIGRAM(S): at 21:39

## 2022-03-10 RX ADMIN — Medication 0.1 MILLIGRAM(S): at 13:12

## 2022-03-10 RX ADMIN — RISPERIDONE 1 MILLIGRAM(S): 4 TABLET ORAL at 08:13

## 2022-03-10 RX ADMIN — Medication 0.1 MILLIGRAM(S): at 21:41

## 2022-03-10 RX ADMIN — DIVALPROEX SODIUM 1250 MILLIGRAM(S): 500 TABLET, DELAYED RELEASE ORAL at 21:42

## 2022-03-10 RX ADMIN — Medication 0.1 MILLIGRAM(S): at 13:15

## 2022-03-10 RX ADMIN — RISPERIDONE 1 MILLIGRAM(S): 4 TABLET ORAL at 21:39

## 2022-03-10 NOTE — BH INPATIENT PSYCHIATRY PROGRESS NOTE - NSBHASSESSSUMMFT_PSY_ALL_CORE
remains in good behavioral control, adherent to meds, denies AVH, denies SI or HI. Podiatry saw this patient recommends XR and per documentation will return to remove sutures. Should patient be discharged before podiatry re-evaluates, recommendations are to follow up outpatient. will continue meds as ordered. clinically at baseline. dispo pending coordination of appropriate aftercare plans

## 2022-03-10 NOTE — PROGRESS NOTE ADULT - SUBJECTIVE AND OBJECTIVE BOX
Podiatry pager #: [phone]/ [phone]    Patient is a 22y old  Male who presents with a chief complaint of disorganization (09 Mar 2022 13:14) podiatry consulted for f/u of left hallux laceration with open fracture on 2/22/22. By another provider. patient still has sutures in place and no dressing on toe. Secondary complaint of ingrowing toenails of both lower extremities      HPI:      PAST MEDICAL & SURGICAL HISTORY:  Bipolar disorder    Schizoaffective disorder        MEDICATIONS  (STANDING):  cephalexin 500 milliGRAM(s) Oral four times a day  cloNIDine 0.1 milliGRAM(s) Oral three times a day  diVALproex ER 1250 milliGRAM(s) Oral at bedtime  LORazepam     Tablet 1 milliGRAM(s) Oral at bedtime  risperiDONE   Tablet 1 milliGRAM(s) Oral two times a day    MEDICATIONS  (PRN):  aluminum hydroxide/magnesium hydroxide/simethicone Suspension 30 milliLiter(s) Oral every 6 hours PRN Dyspepsia  diphenhydrAMINE 50 milliGRAM(s) Oral every 6 hours PRN eps prophylaxis  diphenhydrAMINE Injectable 50 milliGRAM(s) IntraMuscular once PRN severe agitation  haloperidol     Tablet 5 milliGRAM(s) Oral every 6 hours PRN agitation  haloperidol    Injectable 5 milliGRAM(s) IntraMuscular once PRN agitation  LORazepam     Tablet 2 milliGRAM(s) Oral every 6 hours PRN agitation  LORazepam   Injectable 2 milliGRAM(s) IntraMuscular once PRN agitation  ondansetron    Tablet 4 milliGRAM(s) Oral every 6 hours PRN nausea      Allergies    Allergy Status Unknown    Intolerances        VITALS:    Vital Signs Last 24 Hrs  T(C): 36.4 (10 Mar 2022 07:57), Max: 36.7 (09 Mar 2022 16:36)  T(F): 97.6 (10 Mar 2022 07:57), Max: 98 (09 Mar 2022 16:36)  HR: 111 (10 Mar 2022 07:57) (111 - 111)  BP: 117/62 (10 Mar 2022 07:57) (117/62 - 117/62)  BP(mean): --  RR: --  SpO2: --    LABS:                CAPILLARY BLOOD GLUCOSE              LOWER EXTREMITY PHYSICAL EXAM:    Vasular: DP/PT 2_/4, B/L, CFT <_2 seconds B/L, Temperature gradient _wnl, B/L.   Neuro: Epicritic sensation _intact  to the level of toes_, B/L.  Musculoskeletal/Ortho: S/p left hallux open fracture with ehl laceration and tendon intact with normal rom. Moderate edema to left hallux  Skin: sutures intact to left hallux surgical site. no clinical signs of dehiscene or infection. Positive ingrown/incurvated toenails x10      RADIOLOGY & ADDITIONAL STUDIES:    < from: Xray Foot AP + Lateral + Oblique, Left (02.22.22 @ 15:21) >  ACC: [id number] EXAM:  XR FOOT COMP MIN 3 VIEWS LT                        ACC: [id number] EXAM:  XR TOE(S) MIN 2 VIEWS LT                          PROCEDURE DATE:  02/22/2022          INTERPRETATION:  INDICATION: Status post fall and open toe wound.    TECHNIQUE: 3 views of the left toes were obtained    FINDINGS: No fracture or dislocation is seen. The joint spaces are   intact. No osseous lesion is seen. The overlying soft tissues appear   unremarkable.    IMPRESSION: No fracture or dislocation of the left toes.    --- End of Report ---            IRMA MEDINA MD; Attending Radiologist  This document has been electronically signed. Feb 22 2022  5:31PM    < end of copied text >

## 2022-03-10 NOTE — BH INPATIENT PSYCHIATRY PROGRESS NOTE - NSBHFUPINTERVALHXFT_PSY_A_CORE
Chart reviewed including pertinent labs, imaging, ekg. case discussed with treatment team.  Over this interval: no behavioral issues, patient adherent to treatment, seen by podiatry this AM recommends repeat xr

## 2022-03-10 NOTE — PROGRESS NOTE ADULT - ASSESSMENT
Assessment/Plan:    --s/p left hallux laceration repair with previous open hallux fracture? 2/22/22--stable  --ingrown toenails    -- recommend repeat left foot xray to confirm review fracture?  --aseptic debridemont of toenails x10 with betadine applied  --will return to remove sutures from left hallux  --if patient to discharged patient can f/u at [facility] podiatry clinic for f/u.  --thank you for consult

## 2022-03-11 VITALS — DIASTOLIC BLOOD PRESSURE: 73 MMHG | TEMPERATURE: 97 F | HEART RATE: 115 BPM | SYSTOLIC BLOOD PRESSURE: 93 MMHG

## 2022-03-11 PROCEDURE — 99238 HOSP IP/OBS DSCHRG MGMT 30/<: CPT

## 2022-03-11 RX ADMIN — RISPERIDONE 1 MILLIGRAM(S): 4 TABLET ORAL at 08:43

## 2022-03-11 RX ADMIN — Medication 0.1 MILLIGRAM(S): at 08:42

## 2022-03-11 NOTE — BH DISCHARGE NOTE NURSING/SOCIAL WORK/PSYCH REHAB - PATIENT PORTAL LINK FT
You can access the FollowMyHealth Patient Portal offered by [facility] by registering at the following website: [email or web address]. By joining [employer]’s FollowMyHealth portal, you will also be able to view your health information using other applications (apps) compatible with our system.

## 2022-03-11 NOTE — BH INPATIENT PSYCHIATRY PROGRESS NOTE - NSBHFUPINTERVALHXFT_PSY_A_CORE
Chart reviewed including pertinent labs, imaging, ekg. case discussed with treatment team.  Over this interval: patient adherent to treatment, remains in good behavioral control over this interval and over the past week. He is in good spirits, smiles appropriately, excited to be a new uncle. Denies SI and HI. Denies AVH. patient aware of discharge and is agreeable to return to live with his father, where he was living prior to admission.

## 2022-03-11 NOTE — BH INPATIENT PSYCHIATRY PROGRESS NOTE - NSBHASSESSSUMMFT_PSY_ALL_CORE
remains in good behavioral control, adherent to treatment, affectively more reactive, calm and pleasant. Patient agreeable to go back to living with his father and hopes to be connected to supervised residence in the future. etiology of patient's behavioral decompensation multifactorial and includes decompensated psychosis (now resolved) but chronically poor impulses and frustration tolerance in setting of ASD. Patient's PACT team aware of his discharge and will see him today. Father contacted by team s/w and knows of planned discharge and will be home to receive patient.  remains in good behavioral control, adherent to treatment, affectively more reactive, calm and pleasant. Patient agreeable to go back to living with his father and hopes to be connected to supervised residence in the future. etiology of patient's behavioral decompensation multifactorial and includes decompensated psychosis (now resolved) but chronically poor impulses and frustration tolerance in setting of ASD. Patient's PACT team aware of his discharge and will see him today. Father contacted by team s/w and knows of planned discharge and will be home to receive patient. Patient being discharged and transportation being provided via ambulette

## 2022-03-11 NOTE — BH DISCHARGE NOTE NURSING/SOCIAL WORK/PSYCH REHAB - NSCDUDCCRISIS_PSY_A_CORE
[facility] Well  1 [phone]) [facility]-WELL ([phone])  Text "WELL" to [phone]  Website: www.[employer]/.[employer] 1 [phone] 291-LUTM (5351) Website: www.safehorizon.org/.National Suicide Prevention Lifeline 7 (252) 612-3160/.  [employer]  1 (248) LIFENET (390-5811)/.  [facility]  [address] [place] [address] [phone]   Hours:  Monday through Friday from 9 AM to 3 PM/.  [employer]  [employer] - Veterans Crisis Line  [phone], Option 1

## 2022-03-11 NOTE — BH INPATIENT PSYCHIATRY PROGRESS NOTE - MSE OPTIONS
Unstructured MSE
Unstructured MSE
Structured MSE
Unstructured MSE

## 2022-03-11 NOTE — BH DISCHARGE NOTE NURSING/SOCIAL WORK/PSYCH REHAB - NSDCPRGOAL_PSY_ALL_CORE
During the current hospitalization, patient has been working on psychiatric rehabilitation goals pertaining to identifying 2 coping skills that meet his needs. Patient has made good progress. Pt reported coping skills of listening to music and watching tv. Patient is polite and receptive to staff engagement. Pt has been increasingly visible on the milieu. Pt is increasingly social with staff and peers on the unit. Pt reported daily medication compliance. Pt denied suicidal and homicidal ideation/intent/plan. Pt denied auditory and visual hallucinations. Patient reports plans to return to his dad in [place] post discharge.

## 2022-03-11 NOTE — BH INPATIENT PSYCHIATRY PROGRESS NOTE - NSBHMETABOLIC_PSY_ALL_CORE_FT
BMI: BMI (kg/m2): 19 (02-25-22 @ 18:30)  HbA1c: A1C with Estimated Average Glucose Result: 5.3 % (01-26-22 @ 10:04)    Glucose:   BP: 102/75 (03-07-22 @ 08:17) (98/65 - 102/75)  Lipid Panel: Date/Time: 01-26-22 @ 10:04  Cholesterol, Serum: 179  Direct LDL: --  HDL Cholesterol, Serum: 82  Total Cholesterol/HDL Ration Measurement: --  Triglycerides, Serum: 62  
BMI: BMI (kg/m2): 19 (02-25-22 @ 18:30)  HbA1c: A1C with Estimated Average Glucose Result: 5.3 % (01-26-22 @ 10:04)    Glucose:   BP: 107/77 (02-28-22 @ 08:18) (107/77 - 107/77)  Lipid Panel: Date/Time: 01-26-22 @ 10:04  Cholesterol, Serum: 179  Direct LDL: --  HDL Cholesterol, Serum: 82  Total Cholesterol/HDL Ration Measurement: --  Triglycerides, Serum: 62  
BMI: BMI (kg/m2): 19 (02-25-22 @ 18:30)  HbA1c: A1C with Estimated Average Glucose Result: 5.3 % (01-26-22 @ 10:04)    Glucose:   BP: 93/73 (03-11-22 @ 08:08) (93/73 - 117/62)  Lipid Panel: Date/Time: 01-26-22 @ 10:04  Cholesterol, Serum: 179  Direct LDL: --  HDL Cholesterol, Serum: 82  Total Cholesterol/HDL Ration Measurement: --  Triglycerides, Serum: 62  
BMI: BMI (kg/m2): 19 (02-25-22 @ 18:30)  HbA1c: A1C with Estimated Average Glucose Result: 5.3 % (01-26-22 @ 10:04)    Glucose:   BP: 117/62 (03-10-22 @ 07:57) (117/62 - 117/62)  Lipid Panel: Date/Time: 01-26-22 @ 10:04  Cholesterol, Serum: 179  Direct LDL: --  HDL Cholesterol, Serum: 82  Total Cholesterol/HDL Ration Measurement: --  Triglycerides, Serum: 62  
BMI: BMI (kg/m2): 19 (02-25-22 @ 18:30)  HbA1c: A1C with Estimated Average Glucose Result: 5.3 % (01-26-22 @ 10:04)    Glucose:   BP: 102/75 (03-07-22 @ 08:17) (91/69 - 109/75)  Lipid Panel: Date/Time: 01-26-22 @ 10:04  Cholesterol, Serum: 179  Direct LDL: --  HDL Cholesterol, Serum: 82  Total Cholesterol/HDL Ration Measurement: --  Triglycerides, Serum: 62  
BMI: BMI (kg/m2): 19 (02-25-22 @ 18:30)  HbA1c: A1C with Estimated Average Glucose Result: 5.3 % (01-26-22 @ 10:04)    Glucose:   BP: 107/77 (02-28-22 @ 08:18) (107/77 - 120/75)  Lipid Panel: Date/Time: 01-26-22 @ 10:04  Cholesterol, Serum: 179  Direct LDL: --  HDL Cholesterol, Serum: 82  Total Cholesterol/HDL Ration Measurement: --  Triglycerides, Serum: 62  
BMI: BMI (kg/m2): 19 (02-25-22 @ 18:30)  HbA1c: A1C with Estimated Average Glucose Result: 5.3 % (01-26-22 @ 10:04)    Glucose:   BP: 102/75 (03-07-22 @ 08:17) (98/65 - 109/75)  Lipid Panel: Date/Time: 01-26-22 @ 10:04  Cholesterol, Serum: 179  Direct LDL: --  HDL Cholesterol, Serum: 82  Total Cholesterol/HDL Ration Measurement: --  Triglycerides, Serum: 62  
BMI: BMI (kg/m2): 19 (02-25-22 @ 18:30)  HbA1c: A1C with Estimated Average Glucose Result: 5.3 % (01-26-22 @ 10:04)    Glucose:   BP: 107/77 (02-28-22 @ 08:18) (107/77 - 133/78)  Lipid Panel: Date/Time: 01-26-22 @ 10:04  Cholesterol, Serum: 179  Direct LDL: --  HDL Cholesterol, Serum: 82  Total Cholesterol/HDL Ration Measurement: --  Triglycerides, Serum: 62  
BMI: BMI (kg/m2): 19 (02-25-22 @ 18:30)  HbA1c: A1C with Estimated Average Glucose Result: 5.3 % (01-26-22 @ 10:04)    Glucose:   BP: 120/75 (02-27-22 @ 08:12) (120/75 - 133/78)  Lipid Panel: Date/Time: 01-26-22 @ 10:04  Cholesterol, Serum: 179  Direct LDL: --  HDL Cholesterol, Serum: 82  Total Cholesterol/HDL Ration Measurement: --  Triglycerides, Serum: 62  
BMI: BMI (kg/m2): 19 (02-25-22 @ 18:30)  HbA1c: A1C with Estimated Average Glucose Result: 5.3 % (01-26-22 @ 10:04)    Glucose:   BP: --  Lipid Panel: Date/Time: 01-26-22 @ 10:04  Cholesterol, Serum: 179  Direct LDL: --  HDL Cholesterol, Serum: 82  Total Cholesterol/HDL Ration Measurement: --  Triglycerides, Serum: 62  
BMI: BMI (kg/m2): 19 (02-25-22 @ 18:30)  HbA1c: A1C with Estimated Average Glucose Result: 5.3 % (01-26-22 @ 10:04)    Glucose:   BP: --  Lipid Panel: Date/Time: 01-26-22 @ 10:04  Cholesterol, Serum: 179  Direct LDL: --  HDL Cholesterol, Serum: 82  Total Cholesterol/HDL Ration Measurement: --  Triglycerides, Serum: 62  
BMI: BMI (kg/m2): 19 (02-25-22 @ 18:30)  HbA1c: A1C with Estimated Average Glucose Result: 5.3 % (01-26-22 @ 10:04)    Glucose:   BP: 103/68 (03-05-22 @ 09:56) (91/69 - 103/68)  Lipid Panel: Date/Time: 01-26-22 @ 10:04  Cholesterol, Serum: 179  Direct LDL: --  HDL Cholesterol, Serum: 82  Total Cholesterol/HDL Ration Measurement: --  Triglycerides, Serum: 62  
BMI: BMI (kg/m2): 19 (02-25-22 @ 18:30)  HbA1c: A1C with Estimated Average Glucose Result: 5.3 % (01-26-22 @ 10:04)    Glucose:   BP: 109/73 (03-06-22 @ 08:19) (91/69 - 109/75)  Lipid Panel: Date/Time: 01-26-22 @ 10:04  Cholesterol, Serum: 179  Direct LDL: --  HDL Cholesterol, Serum: 82  Total Cholesterol/HDL Ration Measurement: --  Triglycerides, Serum: 62

## 2022-03-11 NOTE — BH INPATIENT PSYCHIATRY PROGRESS NOTE - NSTXDCFAMDATEEST_PSY_ALL_CORE
28-Feb-2022
03-Mar-2022
28-Feb-2022
03-Mar-2022
28-Feb-2022
03-Mar-2022
03-Mar-2022
28-Feb-2022

## 2022-03-11 NOTE — BH INPATIENT PSYCHIATRY PROGRESS NOTE - NSTXPROBDCFAM_PSY_ALL_CORE
DISCHARGE ISSUE - POOR ENGAGEMENT WITH SUPPORTS/FAMILY

## 2022-03-11 NOTE — BH INPATIENT PSYCHIATRY PROGRESS NOTE - NSTXCOPEPROGRES_PSY_ALL_CORE
No Change
Improving
No Change
Met - goal discontinued
No Change

## 2022-03-11 NOTE — BH INPATIENT PSYCHIATRY PROGRESS NOTE - NSTXCOPEDATETRGT_PSY_ALL_CORE
10-Mar-2022
05-Mar-2022
10-Mar-2022
05-Mar-2022
17-Mar-2022
10-Mar-2022
05-Mar-2022
10-Mar-2022
11-Mar-2022
05-Mar-2022

## 2022-03-11 NOTE — BH INPATIENT PSYCHIATRY PROGRESS NOTE - NSBHFUPINTERVALCCFT_PSY_A_CORE
I'm fine
follow up disorganization 
follow up disorganization and psychosis 
Seen for follow up of schizoaffective disorder and autism
follow up disorganization
follow up disorganization (resolved)   day of discharge

## 2022-03-11 NOTE — BH INPATIENT PSYCHIATRY PROGRESS NOTE - NSBHINPTBILLING_PSY_ALL_CORE
[id number] - Inpatient Low Complexity
[id number] - Inpatient Low Complexity
55913 - Inpatient Low Complexity
77085 - Inpatient Low Complexity
11413 - Inpatient Moderate Complexity
32044 - Inpatient Low Complexity
[id number] - Inpatient Low Complexity
46153 - Inpatient Low Complexity
31967 - Inpatient Low Complexity
57984 - Inpatient Low Complexity
82830 - Inpatient Low Complexity
13664 - Hospital Discharge Day Management; 30 min or less
[id number] - Inpatient Moderate Complexity

## 2022-03-11 NOTE — BH INPATIENT PSYCHIATRY PROGRESS NOTE - NSTXPROBCOPE_PSY_ALL_CORE
COPING, INEFFECTIVE

## 2022-03-11 NOTE — BH INPATIENT PSYCHIATRY PROGRESS NOTE - MSE UNSTRUCTURED FT
Appearance: Dressed appropriately.  Behavior: calm, child-like  Motor: Psychomotor slowed.  Speech:  increased latency, decreased productivity  Mood: "good"  Affect: Neutral, slight reactivity  Thought Process: [place], thought blocked  Associations: Limited.  Thought Content: no SI or HI, does not express AVH today  Insight: fair  Judgment: fair and improving    Attention: fair and improving  Language: Fluent.  Gait: Intact but slow.
Appearance: Dressed appropriately.  Behavior: Limited cooperation.  Motor: Psychomotor slowed.  Speech: stuttering, increased latency, decreased productivity  Mood: "okay I guess"  Affect: Neutral, flat, inappropriate smiles  Thought Process: [place], thought blocked  Associations: Limited.  Thought Content: + Delusions, + AH, no VH, no SI or HI  Insight: Limited.  Judgment: Poor  Attention: Poor.  Language: Fluent.  Gait: Intact but slow.
Appearance: Dressed appropriately.  Behavior: Limited cooperation.  Motor: Psychomotor slowed.  Speech: stuttering, increased latency, decreased productivity  Mood: Does not answer about mood.  Affect: Neutral.  Thought Process: [place], thought blocked  Associations: Limited.  Thought Content: Poverty of content.  Insight: Limited.  Judgment: Poor  Attention: Poor.  Language: Fluent.  Gait: Intact but slow.
Appearance: Dressed appropriately.  Behavior: calm, child-like  Motor: Psychomotor slowed.  Speech:  increased latency, relatively more spontaneous  Mood: "good"  Affect: Neutral, slight reactivity  Thought Process: [place], thought blocked  Associations: Limited.  Thought Content: no SI or HI, does not express AVH today  Insight: fair  Judgment: fair and improving    Attention: fair and improving  Language: Fluent.  Gait: Intact but slow.
Appearance: Dressed appropriately.  Behavior: Limited cooperation.  Motor: Psychomotor slowed.  Speech: stuttering, increased latency, decreased productivity  Mood: "fine."  Affect: Neutral, flat  Thought Process: [place], thought blocked  Associations: Limited.  Thought Content: + Delusions, + AH, no VH, no SI or HI  Insight: Limited.  Judgment: Poor  Attention: Poor.  Language: Fluent.  Gait: Intact but slow.
Appearance: Dressed appropriately.  Behavior: calm.  Motor: Psychomotor slowed.  Speech: stuttering, increased latency, decreased productivity  Mood: "fine"  Affect: Neutral, flat, inappropriate smiles  Thought Process: [place], thought blocked  Associations: Limited.  Thought Content: no SI or HI, does not express AVH today  Insight: Limited.  Judgment: Poor  Attention: limited   Language: Fluent.  Gait: Intact but slow.
Appearance: Dressed appropriately.  Behavior: calm, child-like  Motor: Psychomotor slowed.  Speech:   more spontaneous  Mood: "good"  Affect: Neutral, reactive  Thought Process: overall concrete  Associations: Limited.  Thought Content: no SI or HI, does not express AVH today  Insight: fair  Judgment: fair and improving    Attention: fair and improving  Language: Fluent.  Gait: Intact but slow.
Appearance: Dressed appropriately.  Behavior: calm, child-like  Motor: Psychomotor slowed.  Speech:  increased latency, decreased productivity  Mood: "good"  Affect: Neutral, slight reactivity  Thought Process: [place], thought blocked  Associations: Limited.  Thought Content: no SI or HI, does not express AVH today  Insight: Limited.  Judgment: Poor but subtle improvements related to med adherence   Attention: limited   Language: Fluent.  Gait: Intact but slow.
Appearance: Dressed appropriately.  Behavior: calm, child-like  Motor: Psychomotor slowed.  Speech:  increased latency, relatively more spontaneous  Mood: "good"  Affect: Neutral, slight reactivity  Thought Process: [place], thought blocked  Associations: Limited.  Thought Content: no SI or HI, does not express AVH today  Insight: fair  Judgment: fair and improving    Attention: fair and improving  Language: Fluent.  Gait: Intact but slow.
Appearance: Dressed appropriately.  Behavior: Limited cooperation.  Motor: Psychomotor slowed.  Speech: Laconic.  Mood: Does not answer about mood.  Affect: Neutral.  Thought Process: [place], bradyphrenic.  Associations: Limited.  Thought Content: Poverty of content.  Insight: Limited.  Judgment: Poor  Attention: Poor.  Language: Fluent.  Gait: Intact but slow.
Appearance: Dressed appropriately.  Behavior: Limited cooperation.  Motor: Psychomotor slowed.  Speech: stuttering, increased latency, decreased productivity  Mood: Does not answer about mood.  Affect: Neutral.  Thought Process: [place], thought blocked  Associations: Limited.  Thought Content: Poverty of content.  Insight: Limited.  Judgment: Poor  Attention: Poor.  Language: Fluent.  Gait: Intact but slow.
Appearance: Dressed appropriately.  Behavior: calm, child-like  Motor: Psychomotor slowed.  Speech:  increased latency, decreased productivity  Mood: "good"  Affect: Neutral, slight reactivity  Thought Process: [place], thought blocked  Associations: Limited.  Thought Content: no SI or HI, does not express AVH today  Insight: fair  Judgment: fair and improving    Attention: fair and improving  Language: Fluent.  Gait: Intact but slow.

## 2022-03-11 NOTE — BH INPATIENT PSYCHIATRY PROGRESS NOTE - NSBHCHARTREVIEWVS_PSY_A_CORE FT
Vital Signs Last 24 Hrs  T(C): 36.4 (03-05-22 @ 08:16), Max: 37.1 (03-04-22 @ 17:17)  T(F): 97.6 (03-05-22 @ 08:16), Max: 98.8 (03-04-22 @ 17:17)  HR: 103 (03-05-22 @ 09:56) (103 - 105)  BP: 103/68 (03-05-22 @ 09:56) (91/69 - 103/68)  BP(mean): --  RR: --  SpO2: --    Orthostatic VS  03-04-22 @ 20:33  Lying BP: --/-- HR: --  Sitting BP: 116/83 HR: 81  Standing BP: 107/81 HR: 98  Site: --  Mode: --  Orthostatic VS  03-04-22 @ 08:32  Lying BP: --/-- HR: --  Sitting BP: 116/84 HR: 96  Standing BP: --/-- HR: --  Site: --  Mode: --  Orthostatic VS  03-03-22 @ 21:27  Lying BP: --/-- HR: --  Sitting BP: 121/76 HR: 119  Standing BP: 114/70 HR: 109  Site: --  Mode: --  
Vital Signs Last 24 Hrs  T(C): 36.5 (03-04-22 @ 08:32), Max: 37 (03-03-22 @ 16:23)  T(F): 97.7 (03-04-22 @ 08:32), Max: 98.6 (03-03-22 @ 16:23)  HR: --  BP: --  BP(mean): --  RR: --  SpO2: 98% (03-03-22 @ 21:27) (98% - 98%)    Orthostatic VS  03-04-22 @ 08:32  Lying BP: --/-- HR: --  Sitting BP: 116/84 HR: 96  Standing BP: --/-- HR: --  Site: --  Mode: --  Orthostatic VS  03-03-22 @ 21:27  Lying BP: --/-- HR: --  Sitting BP: 121/76 HR: 119  Standing BP: 114/70 HR: 109  Site: --  Mode: --  Orthostatic VS  03-03-22 @ 08:32  Lying BP: --/-- HR: --  Sitting BP: 111/80 HR: 97  Standing BP: --/-- HR: --  Site: --  Mode: --  Orthostatic VS  03-02-22 @ 20:12  Lying BP: --/-- HR: --  Sitting BP: 122/82 HR: 102  Standing BP: 111/91 HR: 112  Site: --  Mode: --  
Vital Signs Last 24 Hrs  T(C): 36.7 (03-01-22 @ 07:54), Max: 37.1 (02-28-22 @ 17:05)  T(F): 98 (03-01-22 @ 07:54), Max: 98.7 (02-28-22 @ 17:05)  HR: --  BP: --  BP(mean): --  RR: --  SpO2: --    Orthostatic VS  03-01-22 @ 07:54  Lying BP: --/-- HR: --  Sitting BP: 115/64 HR: 61  Standing BP: --/-- HR: --  Site: --  Mode: --  
Vital Signs Last 24 Hrs  T(C): 36.5 (02-28-22 @ 08:18), Max: 36.9 (02-27-22 @ 17:25)  T(F): 97.7 (02-28-22 @ 08:18), Max: 98.5 (02-27-22 @ 17:25)  HR: --  BP: 107/77 (02-28-22 @ 08:18) (107/77 - 107/77)  BP(mean): --  RR: --  SpO2: --    
Vital Signs Last 24 Hrs  T(C): 36.5 (03-03-22 @ 08:32), Max: 36.5 (03-03-22 @ 08:32)  T(F): 97.7 (03-03-22 @ 08:32), Max: 97.7 (03-03-22 @ 08:32)  HR: --  BP: --  BP(mean): --  RR: --  SpO2: --    Orthostatic VS  03-03-22 @ 08:32  Lying BP: --/-- HR: --  Sitting BP: 111/80 HR: 97  Standing BP: --/-- HR: --  Site: --  Mode: --  Orthostatic VS  03-02-22 @ 20:12  Lying BP: --/-- HR: --  Sitting BP: 122/82 HR: 102  Standing BP: 111/91 HR: 112  Site: --  Mode: --  Orthostatic VS  03-02-22 @ 07:51  Lying BP: --/-- HR: --  Sitting BP: 113/75 HR: 100  Standing BP: --/-- HR: --  Site: --  Mode: --  Orthostatic VS  03-02-22 @ 06:27  Lying BP: --/-- HR: --  Sitting BP: 113/75 HR: 100  Standing BP: 105/67 HR: 143  Site: --  Mode: --  Orthostatic VS  03-01-22 @ 20:19  Lying BP: 122/68 HR: 98  Sitting BP: --/-- HR: --  Standing BP: --/-- HR: --  Site: --  Mode: --  
Vital Signs Last 24 Hrs  T(C): 36.2 (03-06-22 @ 08:19), Max: 36.6 (03-05-22 @ 19:50)  T(F): 97.2 (03-06-22 @ 08:19), Max: 97.9 (03-05-22 @ 19:50)  HR: 102 (03-06-22 @ 08:19) (102 - 102)  BP: 109/73 (03-06-22 @ 08:19) (109/73 - 109/75)  BP(mean): 79 (03-05-22 @ 19:50) (79 - 79)  RR: --  SpO2: --    Orthostatic VS  03-04-22 @ 20:33  Lying BP: --/-- HR: --  Sitting BP: 116/83 HR: 81  Standing BP: 107/81 HR: 98  Site: --  Mode: --  
Vital Signs Last 24 Hrs  T(C): 36.7 (03-07-22 @ 08:17), Max: 36.7 (03-07-22 @ 08:17)  T(F): 98.1 (03-07-22 @ 08:17), Max: 98.1 (03-07-22 @ 08:17)  HR: 79 (03-07-22 @ 08:17) (71 - 79)  BP: 102/75 (03-07-22 @ 08:17) (98/65 - 102/75)  BP(mean): --  RR: 18 (03-06-22 @ 20:24) (18 - 18)  SpO2: --    
Vital Signs Last 24 Hrs  T(C): 36.3 (03-08-22 @ 08:21), Max: 36.6 (03-07-22 @ 17:23)  T(F): 97.4 (03-08-22 @ 08:21), Max: 97.8 (03-07-22 @ 17:23)  HR: --  BP: --  BP(mean): --  RR: --  SpO2: 100% (03-07-22 @ 20:28) (100% - 100%)    Orthostatic VS  03-08-22 @ 08:21  Lying BP: --/-- HR: --  Sitting BP: 104/71 HR: 82  Standing BP: --/-- HR: --  Site: --  Mode: --  Orthostatic VS  03-07-22 @ 20:28  Lying BP: --/-- HR: --  Sitting BP: 112/79 HR: 73  Standing BP: --/-- HR: --  Site: --  Mode: --  
Vital Signs Last 24 Hrs  T(C): 36.6 (03-02-22 @ 07:51), Max: 36.6 (03-02-22 @ 06:27)  T(F): 97.8 (03-02-22 @ 07:51), Max: 97.8 (03-02-22 @ 06:27)  HR: --  BP: --  BP(mean): --  RR: 16 (03-01-22 @ 20:19) (16 - 16)  SpO2: --    Orthostatic VS  03-02-22 @ 07:51  Lying BP: --/-- HR: --  Sitting BP: 113/75 HR: 100  Standing BP: --/-- HR: --  Site: --  Mode: --  Orthostatic VS  03-02-22 @ 06:27  Lying BP: --/-- HR: --  Sitting BP: 113/75 HR: 100  Standing BP: 105/67 HR: 143  Site: --  Mode: --  Orthostatic VS  03-01-22 @ 20:19  Lying BP: 122/68 HR: 98  Sitting BP: --/-- HR: --  Standing BP: --/-- HR: --  Site: --  Mode: --  Orthostatic VS  03-01-22 @ 07:54  Lying BP: --/-- HR: --  Sitting BP: 115/64 HR: 61  Standing BP: --/-- HR: --  Site: --  Mode: --  
Vital Signs Last 24 Hrs  T(C): 36.8 (02-27-22 @ 08:12), Max: 36.8 (02-27-22 @ 08:12)  T(F): 98.3 (02-27-22 @ 08:12), Max: 98.3 (02-27-22 @ 08:12)  HR: 103 (02-27-22 @ 08:12) (103 - 103)  BP: 120/75 (02-27-22 @ 08:12) (120/75 - 120/75)  BP(mean): --  RR: --  SpO2: --    Orthostatic VS  02-25-22 @ 18:30  Lying BP: --/-- HR: --  Sitting BP: 110/73 HR: 98  Standing BP: 107/71 HR: 112  Site: --  Mode: --  
Vital Signs Last 24 Hrs  T(C): 36.4 (03-10-22 @ 07:57), Max: 36.7 (03-09-22 @ 16:36)  T(F): 97.6 (03-10-22 @ 07:57), Max: 98 (03-09-22 @ 16:36)  HR: 111 (03-10-22 @ 07:57) (111 - 111)  BP: 117/62 (03-10-22 @ 07:57) (117/62 - 117/62)  BP(mean): --  RR: --  SpO2: --    Orthostatic VS  03-09-22 @ 20:41  Lying BP: --/-- HR: --  Sitting BP: 115/55 HR: 70  Standing BP: --/-- HR: --  Site: --  Mode: --  Orthostatic VS  03-09-22 @ 08:05  Lying BP: --/-- HR: --  Sitting BP: 107/71 HR: 91  Standing BP: --/-- HR: --  Site: --  Mode: --  Orthostatic VS  03-08-22 @ 20:23  Lying BP: --/-- HR: --  Sitting BP: 100/70 HR: 73  Standing BP: 106/71 HR: 81  Site: upper left arm  Mode: electronic  
Vital Signs Last 24 Hrs  T(C): 36.2 (03-09-22 @ 08:05), Max: 36.9 (03-08-22 @ 17:22)  T(F): 97.2 (03-09-22 @ 08:05), Max: 98.5 (03-08-22 @ 17:22)  HR: --  BP: --  BP(mean): --  RR: --  SpO2: --    Orthostatic VS  03-09-22 @ 08:05  Lying BP: --/-- HR: --  Sitting BP: 107/71 HR: 91  Standing BP: --/-- HR: --  Site: --  Mode: --  Orthostatic VS  03-08-22 @ 20:23  Lying BP: --/-- HR: --  Sitting BP: 100/70 HR: 73  Standing BP: 106/71 HR: 81  Site: upper left arm  Mode: electronic  Orthostatic VS  03-08-22 @ 08:21  Lying BP: --/-- HR: --  Sitting BP: 104/71 HR: 82  Standing BP: --/-- HR: --  Site: --  Mode: --  Orthostatic VS  03-07-22 @ 20:28  Lying BP: --/-- HR: --  Sitting BP: 112/79 HR: 73  Standing BP: --/-- HR: --  Site: --  Mode: --  
Vital Signs Last 24 Hrs  T(C): 36.3 (03-11-22 @ 08:08), Max: 36.4 (03-10-22 @ 17:30)  T(F): 97.3 (03-11-22 @ 08:08), Max: 97.5 (03-10-22 @ 17:30)  HR: 115 (03-11-22 @ 08:08) (115 - 115)  BP: 93/73 (03-11-22 @ 08:08) (93/73 - 93/73)  BP(mean): --  RR: 16 (03-10-22 @ 20:19) (16 - 16)  SpO2: --    Orthostatic VS  03-10-22 @ 20:19  Lying BP: --/-- HR: --  Sitting BP: 110/66 HR: 96  Standing BP: 100/60 HR: 99  Site: --  Mode: --  Orthostatic VS  03-09-22 @ 20:41  Lying BP: --/-- HR: --  Sitting BP: 115/55 HR: 70  Standing BP: --/-- HR: --  Site: --  Mode: --

## 2022-03-11 NOTE — BH INPATIENT PSYCHIATRY PROGRESS NOTE - NSTXDCFAMGOAL_PSY_ALL_CORE
Will agree to involve supports/family in treatment and discharge planning

## 2022-03-11 NOTE — BH DISCHARGE NOTE NURSING/SOCIAL WORK/PSYCH REHAB - DISCHARGE INSTRUCTIONS AFTERCARE APPOINTMENTS
In order to check the location, date, or time of your aftercare appointment, please refer to your Discharge Instructions Document given to you upon leaving the hospital.  If you have lost the instructions please call [phone]

## 2022-03-11 NOTE — BH INPATIENT PSYCHIATRY PROGRESS NOTE - NSDCCRITERIA_PSY_ALL_CORE
symptoms improved by >80%
When pt is no longer an acute or imminent risk of harm to self or others, and is able to care for self safely, pt may then be discharged.
symptoms improved by >80%
When pt is no longer an acute or imminent risk of harm to self or others, and is able to care for self safely, pt may then be discharged.
symptoms improved by >80%
When pt is no longer an acute or imminent risk of harm to self or others, and is able to care for self safely, pt may then be discharged.

## 2022-03-11 NOTE — BH INPATIENT PSYCHIATRY PROGRESS NOTE - NSTXDCFAMDATETRGT_PSY_ALL_CORE
07-Mar-2022
10-Mar-2022
14-Mar-2022
14-Mar-2022
07-Mar-2022
07-Mar-2022
14-Mar-2022
10-Mar-2022
17-Mar-2022

## 2022-03-11 NOTE — BH INPATIENT PSYCHIATRY PROGRESS NOTE - NSTXCOPEGOAL_PSY_ALL_CORE
Identify and utilize 2 coping skills that meet their needs

## 2022-03-11 NOTE — BH INPATIENT PSYCHIATRY PROGRESS NOTE - NSTXDCFAMPROGRES_PSY_ALL_CORE
No Change
No Change
Improving
Improving
No Change
Improving
No Change
Improving

## 2022-03-11 NOTE — BH INPATIENT PSYCHIATRY PROGRESS NOTE - NSBHCONSBHPROVDETAILS_PSY_A_CORE  FT
pt has been on 2N

## 2022-03-11 NOTE — BH INPATIENT PSYCHIATRY PROGRESS NOTE - NSTXCOPEDATEEST_PSY_ALL_CORE
03-Mar-2022
26-Feb-2022
03-Mar-2022
26-Feb-2022
26-Feb-2022
03-Mar-2022
26-Feb-2022
03-Mar-2022
26-Feb-2022
26-Feb-2022

## 2022-03-11 NOTE — BH INPATIENT PSYCHIATRY PROGRESS NOTE - NSBHCONSULTIPREASON_PSY_A_CORE
other reason
danger to self; mental illness expected to respond to inpatient care
other reason
other reason
danger to self; mental illness expected to respond to inpatient care
other reason

## 2022-07-27 NOTE — BH INPATIENT PSYCHIATRY PROGRESS NOTE - CURRENT MEDICATION
Hospitalist Admission Note    NAME: Talha Tang   :  1946   MRN:  [id number]     Date/Time:  2022 7:34 PM    Patient PCP: Leonila Collins MD  ______________________________________________________________________  Given the patient's current clinical presentation, I have a high level of concern for decompensation if discharged from the emergency department. Complex decision making was performed, which includes reviewing the patient's available past medical records, laboratory results, and x-ray films. My assessment of this patient's clinical condition and my plan of care is as follows. Assessment / Plan:  Bilateral lower extremity wounds POA  Bilateral lower extremity cellulitis  -Has chronic wounds and follows up with wound care center  -We will consult wound care per start empiric antibiotics including vancomycin and aztreonam.  She is allergic to cephalosporins. Keep both legs elevated. -Avoid SCDs  -Patient's BMP hemolyzed and waiting on repeat CMP. Follow-up on pending results. COVID-19 infection  -She is currently on room air. Check CRP and D-dimer. Monitor for hypoxia. Diabetes mellitus type 2  -Resume home insulin NPH at slightly lower dose of 30 units twice daily. Start insulin sliding scale with blood sugar checks. Hypertension  Dyslipidemia  Hypothyroidism  Atrial fibrillation on anticoagulation with Xarelto  History of CAD  History of CVA  Diastolic congestive heart failure compensated  -Patient does not remember her medications accurately so we will consult pharmacy for medication reconciliation  -Continue home Norvasc, Pravachol, Toprol, clonidine with holding parameters  -Continue home Coreg, Xarelto  -Continue home Lasix    I called pt's son at [phone] to update but he did not  so left a message.        Code Status: Full code  Surrogate Decision Maker:  [place]    DVT Prophylaxis: Xarelto      Baseline: From home, has chronic bilateral lower extremity ulcers      Subjective:   CHIEF COMPLAINT: Generalized weakness    HISTORY OF PRESENT ILLNESS:     Lita Bobby is a 64 y.o.   female who presents with past medical history of diabetes mellitus, hypertension, congestive heart failure, atrial fibrillation is coming the hospital chief complaints of generalized weakness and also not feeling well since last 1 week. Patient reports that she was in usual state of health until about a week ago when she noticed some weakness. She had 3 positive test for COVID at home. She reports fatigue and weakness involving both her arms and legs and not able to do her usual household stuff. She reports increasing pain around wounds of both of her legs. Does not report any chest pain or shortness of breath. Does not report any abdominal pain, nausea or vomiting. On arrival to ED, noted to have stable vitals. On labs CBC is normal.  BMP hemolyzed and repeat is pending. X-ray of the left foot shows diffuse osteopenia with no osseous process and diffuse soft tissue swelling. Chest x-ray shows no acute findings. We were asked to admit for work up and evaluation of the above problems.      Past Medical History:   Diagnosis Date    Anticoagulant long-term use 10/13/2017    Anxiety 10/13/2017    Atrial fibrillation ([address] 10/13/2017    Breast calcification, left 10/13/2017    CAD (coronary artery disease)     Cellulitis of both lower extremities 10/13/2017    Chronic kidney disease     kidney stones    Chronic pain syndrome 10/13/2017    Chronic prescription opiate use 11/15/2017    CVA (cerebral vascular accident) ([address] 10/13/2017    Diabetes ([address]     DJD (degenerative joint disease) 10/13/2017    Dyslipidemia 10/13/2017    Glaucoma 10/13/2017    Hypertension     Hyperthyroidism 10/13/2017    Long-term use of high-risk medication 10/13/2017    Stasis ulcer of lower extremity ([address] 10/13/2017    Stroke ([address]     Type 2 diabetes mellitus with background retinopathy ([facility] 8/18/2012    retinopathy         Past Surgical History:   Procedure Laterality Date    HX BREAST BIOPSY Left     2014    HX GYN      hysterectomty    HX ORTHOPAEDIC      back surgery    [facility] CARDIAC SURG PROCEDURE UNLIST      cagb       Social History     Tobacco Use    Smoking status: Never    Smokeless tobacco: Never   Substance Use Topics    Alcohol use: No        Family History   Problem Relation Age of Onset    Heart Disease Father      Allergies   Allergen Reactions    Betadine Prepstick Rash    Keflex [Cephalexin] Hives     Pt breaks out in hives        Prior to Admission medications    Medication Sig Start Date End Date Taking? Authorizing Provider   insulin NPH (NovoLIN N NPH U-100 Insulin) 100 unit/mL injection 44 Units by SubCUTAneous route every morning. 33 units in evening 7/15/22   Kelin Lind MD   glucose blood VI test strips (FreeStyle Lite Strips) strip USE 1 STRIP TO CHECK GLUCOSE ONCE DAILY 7/15/22   Kelin Lind MD   Insulin Syringe-Needle U-100 (BD Insulin Syringe Ultra-Fine) 0.5 mL 31 gauge x 5/16\" syrg USE 1 SYRINGE TWICE A DAY 7/15/22   Kelin Lind MD   ondansetron hcl (Zofran) 4 mg tablet Take 1 Tablet by mouth every eight (8) hours as needed for Nausea. 6/1/22   Zechariah Osorio MD   cloNIDine HCL (CATAPRES) 0.1 mg tablet TAKE 1 TABLET THREE TIMES A DAY 3/22/22   Sebastian Rios MD   carvediloL (COREG) 12.5 mg tablet TAKE 1 TABLET TWICE A DAY 2/16/22   Sebastian Rios MD   amLODIPine (NORVASC) 10 mg tablet Take 1 Tablet by mouth daily. 2/2/22   Sebastian Rios MD   pravastatin (PRAVACHOL) 80 mg tablet TAKE 1 TABLET NIGHTLY 9/14/21   Sebastian Rios MD   rivaroxaban (Xarelto) 20 mg tab tablet Take 1 Tablet by mouth daily.  9/14/21   Yoni Lozano MD   mupirocin (BACTROBAN) 2 % ointment APPLY TOPICALLY TO THE AFFECTED AREA DAILY 6/22/21   Yoni Lozano MD   sodium hypochlorite (Dakin's Solution) external solution Apply to wounds twice daily and cover with dressing 6/8/21   Alexia Zapien, [place]   captopriL (CAPOTEN) 25 mg tablet TAKE 1 TABLET TWICE A DAY 5/31/21   Raquel Fraire MD   furosemide (LASIX) 40 mg tablet TAKE 1 TABLET DAILY  Patient taking differently: Take 40 mg by mouth two (2) times a day. 5/5/21   Raquel Fraire MD   lancets (FreeStyle Lancets) 28 gauge misc Daily blood sugar checks 10/5/20   Raquel Fraire MD   pomegranate xt/pomegranat seed (POMEGRANATE PO) Take  by mouth. Provider, Historical   flash glucose scanning reader (FREESTYLE JOSE LUIS READER) [facility] Use to check blood sugars daily  DX: E11.9 8/17/18   Raquel Fraire MD   flash glucose sensor (FREESTYLE JOSE LUIS SENSOR) kit Use to check blood sugars daily  DX: E11.9 8/17/18   Raquel Fraire MD   ergocalciferol (VITAMIN D2) 50,000 unit capsule Take 50,000 Units by mouth every seven (7) days. Provider, Historical   Blood-Glucose Meter (FREESTYLE FREEDOM LITE) monitoring kit Daily blood sugar checks 1/11/18   Ml Lozano MD   naloxone [facility]) 4 mg/actuation nasal spray Use 1 spray intranasally into 1 nostril. Indications: OPIATE-INDUCED RESPIRATORY DEPRESSION, Opioid Toxicity  Patient not taking: Reported on 5/18/2022 11/15/17   Raquel Fraire MD   latanoprost (XALATAN) 0.005 % ophthalmic solution Administer 1 Drop to both eyes nightly. Provider, Historical   aspirin (ASPIRIN) 325 mg tablet Take 1 Tab by mouth daily. 8/22/12   Raquel Fraire MD       REVIEW OF SYSTEMS:     I am not able to complete the review of systems because:    The patient is intubated and sedated    The patient has altered mental status due to his acute medical problems    The patient has baseline aphasia from prior stroke(s)    The patient has baseline dementia and is not reliable historian    The patient is in acute medical distress and unable to provide information           Total of 12 systems reviewed as follows:       POSITIVE= underlined text  Negative = text not underlined  General:  fever, chills, sweats, generalized weakness, weight loss/gain,      loss of appetite   Eyes:    blurred vision, eye pain, loss of vision, double vision  ENT:    rhinorrhea, pharyngitis   Respiratory:   cough, sputum production, SOB, MARTINEZ, wheezing, pleuritic pain   Cardiology:   chest pain, palpitations, orthopnea, PND, edema, syncope   Gastrointestinal:  abdominal pain , N/V, diarrhea, dysphagia, constipation, bleeding   Genitourinary:  frequency, urgency, dysuria, hematuria, incontinence   Muskuloskeletal :  arthralgia, myalgia, back pain  Hematology:  easy bruising, nose or gum bleeding, lymphadenopathy   Dermatological: rash, ulceration, pruritis, color change / jaundice  Endocrine:   hot flashes or polydipsia   Neurological:  headache, dizziness, confusion, focal weakness, paresthesia,     Speech difficulties, memory loss, gait difficulty  Psychological: Feelings of anxiety, depression, agitation    Objective:   VITALS:    Visit Vitals  BP (!) 154/56 (BP 1 Location: Right upper arm)   Pulse 82   Temp 98.5 °F (36.9 °C)   Resp 18   Ht 5' 1\" (1.549 m)   Wt 99.8 kg (220 lb)   SpO2 98%   BMI 41.57 kg/m²       PHYSICAL EXAM:    General:    Alert, cooperative, no distress, appears stated age. HEENT: Atraumatic, anicteric sclerae, pink conjunctivae     No oral ulcers, mucosa moist,  Neck:  Supple, symmetrical,  thyroid: non tender  Lungs:   Clear to auscultation bilaterally. No Wheezing or Rhonchi. No rales. Chest wall:  No tenderness  No Accessory muscle use. Heart:   Regular  rhythm,  No  murmur   No edema  Abdomen:   Soft, erythema of abdominal folds, nontender  Extremities: Multiple bilateral lower extremity wounds with erythema and increased warmth  Skin:     Sacral nonblanchable redness  Psych:  Not anxious or agitated. Neurologic: EOMs intact. No facial asymmetry. No aphasia or slurred speech.  Symmetrical strength, Sensation grossly intact. Alert and awake    _______________________________________________________________________  Care Plan discussed with:    Comments   Patient y    Family      RN y    Care Manager                    Consultant:      _______________________________________________________________________  Expected  Disposition:   Home with Family y   HH/PT/OT/RN    SNF/LTC    SAMIA    ________________________________________________________________________  TOTAL TIME:  61  Minutes    Critical Care Provided     Minutes non procedure based      Comments    y Reviewed previous records   >50% of visit spent in counseling and coordination of care y Discussion with patient and/or family and questions answered       ________________________________________________________________________  Signed: Kayleigh Santiago MD    Procedures: see electronic medical records for all procedures/Xrays and details which were not copied into this note but were reviewed prior to creation of Plan. LAB DATA REVIEWED:    Recent Results (from the past 24 hour(s))   CBC WITH AUTOMATED DIFF    Collection Time: 07/27/22  2:16 PM   Result Value Ref Range    WBC 5.4 3.6 - 11.0 K/uL    RBC 4.00 3.80 - 5.20 M/uL    HGB 11.5 11.5 - 16.0 g/dL    HCT 35.1 35.0 - 47.0 %    MCV 87.8 80.0 - 99.0 FL    MCH 28.8 26.0 - 34.0 PG    MCHC 32.8 30.0 - 36.5 g/dL    RDW 13.4 11.5 - 14.5 %    PLATELET 078 672 - 636 K/uL    MPV 10.0 8.9 - 12.9 FL    NRBC 0.0 0  WBC    ABSOLUTE NRBC 0.00 0.00 - 0.01 K/uL    NEUTROPHILS 68 32 - 75 %    LYMPHOCYTES 22 12 - 49 %    MONOCYTES 8 5 - 13 %    EOSINOPHILS 2 0 - 7 %    BASOPHILS 0 0 - 1 %    IMMATURE GRANULOCYTES 0 0.0 - 0.5 %    ABS. NEUTROPHILS 3.7 1.8 - 8.0 K/UL    ABS. LYMPHOCYTES 1.2 0.8 - 3.5 K/UL    ABS. MONOCYTES 0.4 0.0 - 1.0 K/UL    ABS. EOSINOPHILS 0.1 0.0 - 0.4 K/UL    ABS. BASOPHILS 0.0 0.0 - 0.1 K/UL    ABS. IMM.  GRANS. 0.0 0.00 - 0.04 K/UL    DF AUTOMATED     URINALYSIS W/ REFLEX CULTURE Collection Time: 07/27/22  6:46 PM    Specimen: Urine   Result Value Ref Range    Color YELLOW/STRAW      Appearance CLOUDY (A) CLEAR      Specific gravity 1.009      pH (UA) 5.5 5.0 - 8.0      Protein Negative NEG mg/dL    Glucose Negative NEG mg/dL    Ketone Negative NEG mg/dL    Bilirubin Negative NEG      Blood MODERATE (A) NEG      Urobilinogen 0.2 0.2 - 1.0 EU/dL    Nitrites Negative NEG      Leukocyte Esterase LARGE (A) NEG      WBC PENDING /hpf    RBC PENDING /hpf    Epithelial cells PENDING /lpf    Bacteria PENDING /hpf    UA:UC IF INDICATED PENDING MEDICATIONS  (STANDING):  cloNIDine 0.1 milliGRAM(s) Oral three times a day  diVALproex ER 1250 milliGRAM(s) Oral at bedtime  LORazepam     Tablet 1 milliGRAM(s) Oral at bedtime  risperiDONE   Tablet 1 milliGRAM(s) Oral two times a day    MEDICATIONS  (PRN):  aluminum hydroxide/magnesium hydroxide/simethicone Suspension 30 milliLiter(s) Oral every 6 hours PRN Dyspepsia  diphenhydrAMINE 50 milliGRAM(s) Oral every 6 hours PRN eps prophylaxis  diphenhydrAMINE Injectable 50 milliGRAM(s) IntraMuscular once PRN severe agitation  haloperidol     Tablet 5 milliGRAM(s) Oral every 6 hours PRN agitation  haloperidol    Injectable 5 milliGRAM(s) IntraMuscular once PRN agitation  LORazepam     Tablet 2 milliGRAM(s) Oral every 6 hours PRN agitation  LORazepam   Injectable 2 milliGRAM(s) IntraMuscular once PRN agitation  ondansetron    Tablet 4 milliGRAM(s) Oral every 6 hours PRN nausea

## 2023-03-07 NOTE — BH INPATIENT PSYCHIATRY ASSESSMENT NOTE - NSBHCHARTREVIEWVS_PSY_A_CORE FT
Vital Signs Last 24 Hrs  T(C): 36.7 (02-26-22 @ 09:27), Max: 36.7 (02-25-22 @ 14:15)  T(F): 98 (02-26-22 @ 09:27), Max: 98.1 (02-25-22 @ 18:30)  HR: 105 (02-26-22 @ 09:27) (74 - 105)  BP: 133/78 (02-26-22 @ 09:27) (96/66 - 133/78)  BP(mean): --  RR: 18 (02-25-22 @ 14:15) (17 - 18)  SpO2: 100% (02-25-22 @ 14:15) (99% - 100%)    Orthostatic VS  02-25-22 @ 18:30  Lying BP: --/-- HR: --  Sitting BP: 110/73 HR: 98  Standing BP: 107/71 HR: 112  Site: --  Mode: --   Benzoyl Peroxide Pregnancy And Lactation Text: This medication is Pregnancy Category C. It is unknown if benzoyl peroxide is excreted in breast milk.

## 2024-07-08 NOTE — BH INPATIENT PSYCHIATRY PROGRESS NOTE - NSCGISEVERILLNESS_PSY_ALL_CORE
3 = Mildly ill – clearly established symptoms with minimal, if any, distress or difficulty in social and occupational function
Admission

## 2024-09-09 NOTE — BH INPATIENT PSYCHIATRY ASSESSMENT NOTE - NSDCCRITERIA_PSY_ALL_CORE
Ok to add tmorrow 9/10/24 at 10:15am;    CGI< / = 3 When pt is no longer an acute or imminent risk of harm to self or others, and is able to care for self safely, pt may then be discharged.

## 2025-06-04 NOTE — PHYSICAL THERAPY INITIAL EVALUATION ADULT - GENERAL OBSERVATIONS, REHAB EVAL
oriented to person, place and time, cooperative with exam Pt encountered in semisupine position on stretcher in the ED, no distress, AxOx4, with +IV, Left foot wrapped in dressing dry/intact in surgical shoe, and on constant observation.